# Patient Record
Sex: MALE | Race: BLACK OR AFRICAN AMERICAN | NOT HISPANIC OR LATINO | Employment: OTHER | URBAN - METROPOLITAN AREA
[De-identification: names, ages, dates, MRNs, and addresses within clinical notes are randomized per-mention and may not be internally consistent; named-entity substitution may affect disease eponyms.]

---

## 2017-01-03 DIAGNOSIS — H90.8 MIXED HEARING LOSS: Primary | ICD-10-CM

## 2017-01-05 ENCOUNTER — OFFICE VISIT (OUTPATIENT)
Dept: OPHTHALMOLOGY | Facility: CLINIC | Age: 67
End: 2017-01-05
Attending: OPHTHALMOLOGY
Payer: COMMERCIAL

## 2017-01-05 ENCOUNTER — OFFICE VISIT (OUTPATIENT)
Dept: AUDIOLOGY | Facility: CLINIC | Age: 67
End: 2017-01-05

## 2017-01-05 ENCOUNTER — OFFICE VISIT (OUTPATIENT)
Dept: OTOLARYNGOLOGY | Facility: CLINIC | Age: 67
End: 2017-01-05

## 2017-01-05 DIAGNOSIS — H90.71 MIXED HEARING LOSS OF RIGHT EAR: Primary | ICD-10-CM

## 2017-01-05 DIAGNOSIS — H04.123 DRY EYE SYNDROME OF BILATERAL LACRIMAL GLANDS: ICD-10-CM

## 2017-01-05 DIAGNOSIS — H90.5 SENSORINEURAL HEARING LOSS OF LEFT EAR: ICD-10-CM

## 2017-01-05 DIAGNOSIS — H90.6 MIXED CONDUCTIVE AND SENSORINEURAL HEARING LOSS OF BOTH EARS: ICD-10-CM

## 2017-01-05 DIAGNOSIS — H72.91 PERFORATED EARDRUM, RIGHT: Primary | ICD-10-CM

## 2017-01-05 DIAGNOSIS — T85.22XA DISLOCATED IOL (INTRAOCULAR LENS), POSTERIOR, RIGHT: Primary | ICD-10-CM

## 2017-01-05 PROCEDURE — 99214 OFFICE O/P EST MOD 30 MIN: CPT | Mod: 25

## 2017-01-05 PROCEDURE — 68761 CLOSE TEAR DUCT OPENING: CPT | Mod: RT | Performed by: OPHTHALMOLOGY

## 2017-01-05 PROCEDURE — T1013 SIGN LANG/ORAL INTERPRETER: HCPCS | Mod: U3,ZF

## 2017-01-05 ASSESSMENT — CUP TO DISC RATIO: OD_RATIO: 0.9

## 2017-01-05 ASSESSMENT — CONF VISUAL FIELD
OS_SUPERIOR_TEMPORAL_RESTRICTION: 1
OD_INFERIOR_NASAL_RESTRICTION: 1
OD_SUPERIOR_TEMPORAL_RESTRICTION: 3
OS_SUPERIOR_NASAL_RESTRICTION: 1
OS_INFERIOR_NASAL_RESTRICTION: 1
OD_INFERIOR_TEMPORAL_RESTRICTION: 3
OS_INFERIOR_TEMPORAL_RESTRICTION: 1
OD_SUPERIOR_NASAL_RESTRICTION: 3

## 2017-01-05 ASSESSMENT — TONOMETRY
OS_IOP_MMHG: 49
IOP_METHOD: TONOPEN
OD_IOP_MMHG: 10

## 2017-01-05 ASSESSMENT — VISUAL ACUITY
OS_SC: NLP
OD_PH_SC: 20/200
METHOD: SNELLEN - LINEAR
OD_SC: 20/250

## 2017-01-05 ASSESSMENT — SLIT LAMP EXAM - LIDS
COMMENTS: NORMAL
COMMENTS: NORMAL

## 2017-01-05 ASSESSMENT — PAIN SCALES - GENERAL: PAINLEVEL: NO PAIN (0)

## 2017-01-05 NOTE — PROGRESS NOTES
CC: Referral for subluxed lens RE    Pt cannot hear well. Son reports poor vision, worsening last 1.5 yr, told IOL was dislocated. Surgery in [place].    A/P:    1. Dry eye OD > OS.  2. Pterygium LE.  3. Advanced glaucoma RE.  4. NLP OS.  5. Dislocated intraocular lens RE.  Appears to be 3 piece, may be rigid PMMA.    Recommend optimize surface with preservative free artificial tears every 1-2 hours and Refresh PM at night.    Placed collagen plugs 0.3 mm RLL and 0.2 mm RUL (silicone small did not fit).    Then IOL explantation with ACIOL RE.    Counseled family re: plan      F/u 1 month surface check, can sign up for surgery if surface improved.      ~~~~~~~~~~~~~~~~~~~~~~~~~~~~~~~~~~~~~~~~~~~~~~~~~~~~~~~~~~~~~~~~    I have personally examined the patient and agree with the assessment and plan as delineated by the resident / fellow.  I have confirmed the contents of the chief complaint, history of present illness, review of systems, and medical / surgical history sections and edited the note as needed.    Isai Varma MD, MA  Director, Cornea & Anterior Segment  [facility] Department of Ophthalmology & Visual Neuroscience

## 2017-01-05 NOTE — NURSING NOTE
Chief Complaint   Patient presents with     Consult     hearing loss right ear      Janes Pichardo LPN

## 2017-01-05 NOTE — PATIENT INSTRUCTIONS
Preservative free artificial tears every 1-2 hours    Artificial tear lubricating eye ointment at night (such as Refresh PM)

## 2017-01-05 NOTE — Clinical Note
1/5/2017       RE: Shayne Fiore  [address]   [facility] [address]     Dear Colleague,    Thank you for referring your patient, Shayne Fiore, to the [facility] EAR NOSE AND THROAT at [facility]. Please see a copy of my visit note below.    The patient presents with a history of hearing loss.  The patient reports a progressive hearing loss in both ears over many years, but the left ear is worse than the right ear.  The patient denies ear recent infections, otalgia, otorrhea, dizziness, or tinnitus.  He has had ear infections in the past. The patient denies sinusitis, rhinitis, facial pain, nasal obstruction or purulent nasal discharge. The patient denies chronic or recurrent tonsillitis, chronic or recurrent pharyngitis. The patient's Audiogram and Tympanogram are reviewed with him and they demonstrate bilateral moderate to severe sloping sensorineural hearing loss with an added moderate conductive hearing loss in the right ear. The word recognition scores are 52% on the right and 96% on the left. The patient's tympanogram on the right is flat and it is normal on the left.     This patient is seen in consultation at the request of Dr. Juani Choudhary.    All other systems were reviewed and they are either negative or they are not directly pertinent to this Otolaryngology examination.      Past Medical History:    No past medical history on file.    Past Surgical History:    Past Surgical History   Procedure Laterality Date     Eye surgery Right      x 2, unsure what type of surgery     Scrotal hematoma Right        Medications:      Current outpatient prescriptions:      Hypromellose (ARTIFICIAL TEARS OP), Apply 1 drop to eye as needed, Disp: , Rfl:      oxybutynin (DITROPAN-XL) 5 MG 24 hr tablet, Take 1 tablet (5 mg) by mouth At Bedtime, Disp: 30 tablet, Rfl: 3    Allergies:    Review of patient's allergies indicates no known allergies.    Physical  Examination:    The patient is a well developed, well nourished male in no apparent distress.  He is normocephalic, atraumatic with pupils equally round and reactive to light.    Oral Cavity Examination:  Normal mucosa with no masses or lesions  Nasal Examination: Normal mucosa with no masses or lesions  Ear Examination: Ear canals are impacted with cerumen. The ear canals are cleaned of cerumen using an alligator forceps using a binocular microscope for visualization. The tympanic membrane and middle ear space on the left are normal. The right tympanic membrane has a 60% inferior perforation with no infection or cholesteatoma in the middle ear.   Neurological Examination: Facial nerve function intact and symmetric  Integumentary Examination: No lesions on the skin of the head and neck  Neck Examination: No masses or lesions, no lymphadenopathy  Endocrine Examination: Normal thyroid examination    Assessment and Plan:    The patient presents with a history of hearing loss and a perforation of the right tympanic membrane. The patient will be referred for a CT scan of the temporal bones and a consultation with Dr. Felicia Pereira for possible surgical closure of the right tympanic membrane. The patient will also be referred for a hearing aid consultation.      CC: Dr. Juani Choudhary    Again, thank you for allowing me to participate in the care of your patient.      Sincerely,    Chris Louis MD

## 2017-01-05 NOTE — PROGRESS NOTES
AUDIOLOGY REPORT    SUMMARY: Audiology visit completed. See audiogram for results.      RECOMMENDATIONS: Follow-up with ENT.      Fernanda Waterman, CCC-A  Licensed Audiologist  [place] #[id number]

## 2017-01-05 NOTE — MR AVS SNAPSHOT
After Visit Summary   1/5/2017    Shayne Fiore    MRN: [id number]           Patient Information     Date Of Birth          1950        Visit Information        Provider Department      1/5/2017 8:00 AM Stephanie Cotter; Isia Varma MD [facility]        Today's Diagnoses     Dislocated IOL (intraocular lens), posterior, right    -  1     Dry eye syndrome of bilateral lacrimal glands           Care Instructions    Preservative free artificial tears every 1-2 hours    Artificial tear lubricating eye ointment at night (such as Refresh PM)        Follow-ups after your visit        Your next 10 appointments already scheduled     Jan 27, 2017  1:00 PM   (Arrive by 12:45 PM)   MR PROSTATE with [id number]   [facility] MRI ([facility] [place])    [address] [address] [phone]           Take your medicines as usual, unless your doctor tells you not to. Bring a list of your current medicines to your exam (including vitamins, minerals and over-the-counter drugs). Also bring the results of similar scans you may have had.  Please remove any body piercings and hair extensions before you arrive.  Follow your doctor s orders. If you do not, we may have to postpone your exam.  You will not have contrast for this exam. You do not need to do anything special to prepare.  The MRI machine uses a strong magnet. Please wear clothes without metal (snaps, zippers). A sweatsuit works well, or we may give you a hospital gown.   **IMPORTANT** THE INSTRUCTIONS BELOW ARE ONLY FOR THOSE PATIENTS WHO HAVE BEEN TOLD THEY WILL RECEIVE SEDATION OR GENERAL ANESTHESIA DURING THEIR MRI PROCEDURE:  IF YOU WILL RECEIVE SEDATION (take medicine to help you relax during your exam):   You must get the medicine from your doctor before you arrive. Bring the medicine to the exam. Do not take it at home.   Arrive one hour early. Bring someone who can take you  home after the test. Your medicine will make you sleepy. After the exam, you may not drive, take a bus or take a taxi by yourself.   No eating 8 hours before your exam. You may have clear liquids up until 4 hours before your exam. (Clear liquids include water, clear tea, black coffee and fruit juice without pulp.)  IF YOU WILL RECEIVE ANESTHESIA (be asleep for your exam):   Arrive 1 1/2 hours early. Bring someone who can take you home after the test. You may not drive, take a bus or take a taxi by yourself.   No eating 8 hours before your exam. You may have clear liquids up until 4 hours before your exam. (Clear liquids include water, clear tea, black coffee and fruit juice without pulp.)   You will spend four to five hours in the recovery room.  Please call the Imaging Department at your exam site with any questions.            Jan 27, 2017  2:30 PM   (Arrive by 2:15 PM)   Return Visit with Dolly Granda MD   [facility] Urology and [facility] for Prostate and Urologic Cancers ([email or web address] and [facility] Center)    [address] [address] [phone]            Feb 03, 2017  7:45 AM   RETURN CORNEA with Isai Varma MD   Eye Clinic ([facility])    [address] Taylor [address] Clin [address]  [facility] [id number] [phone]              Who to contact     Please call your clinic at [phone] to:    Ask questions about your health    Make or cancel appointments    Discuss your medicines    Learn about your test results    Speak to your doctor   If you have compliments or concerns about an experience at your clinic, or if you wish to file a complaint, please contact [facility] Patient Relations at [phone] or email us at [email or web address]         Additional Information About Your Visit        JustGohart Information     [employer] is an electronic gateway that provides easy, online access to your  medical records. With [employer], you can request a clinic appointment, read your test results, renew a prescription or communicate with your care team.     To sign up for [employer] visit the website at [email or web address]   You will be asked to enter the access code listed below, as well as some personal information. Please follow the directions to create your username and password.     Your access code is: [email or web address]  Expires: 2017  6:31 AM     Your access code will  in 90 days. If you need help or a new code, please contact your [facility] or call [phone] for assistance.        Care EveryWhere ID     This is your Care EveryWhere ID. This could be used by other organizations to access your [place] medical records  [id number]         Blood Pressure from Last 3 Encounters:   16 148/73    Weight from Last 3 Encounters:   16 58.832 kg (129 lb 11.2 oz)              We Performed the Following     Punctal Closure, Plugs        Primary Care Provider Office Phone # Fax Sathish Choudhary [phone]       [facility]  [facility] [address]        Thank you!     Thank you for choosing EYE CLINIC  for your care. Our goal is always to provide you with excellent care. Hearing back from our patients is one way we can continue to improve our services. Please take a few minutes to complete the written survey that you may receive in the mail after your visit with us. Thank you!             Your Updated Medication List - Protect others around you: Learn how to safely use, store and throw away your medicines at www.disposemymeds.org.          This list is accurate as of: 17  9:33 AM.  Always use your most recent med list.                   Brand Name Dispense Instructions for use    ARTIFICIAL TEARS OP      Apply 1 drop to eye as needed       oxybutynin 5 MG 24 hr tablet    DITROPAN-XL    30 tablet    Take 1  tablet (5 mg) by mouth At Bedtime

## 2017-01-05 NOTE — MR AVS SNAPSHOT
After Visit Summary   1/5/2017    Shayne Fiore    MRN: [id number]           Patient Information     Date Of Birth          1950        Visit Information        Provider Department      1/5/2017 3:00 PM Nelson Flores; Chris Louis MD [facility] Ear Nose and Throat        Today's Diagnoses     Perforated eardrum, right    -  1     Mixed conductive and sensorineural hearing loss of both ears           Care Instructions    The patient presents with a history of hearing loss and a perforation of the right tympanic membrane. The patient will be referred for a CT scan of the temporal bones and a consultation with Dr. Felicia Pereira for possible surgical closure of the right tympanic membrane. The patient will also be referred for a hearing aid consultation.         Follow-ups after your visit        Your next 10 appointments already scheduled     Jan 05, 2017  6:00 PM   CT TEMPORAL ORBITAL SELLA W/O CONTRAST with UCCT1   [facility] [place] [facility] ([facility])    [address] [place] [address] [phone]           Please bring any scans or X-rays taken at other hospitals, if similar tests were done. Also bring a list of your medicines, including vitamins, minerals and over-the-counter drugs. It is safest to leave personal items at home.  Be sure to tell your doctor:   If you have any allergies.   If there s any chance you are pregnant.   If you are breastfeeding.   If you have any special needs.  You do not need to do anything special to prepare.  Please wear loose clothing, such as a sweat suit or jogging clothes. Avoid snaps, zippers and other metal. We may ask you to undress and put on a hospital gown.            Jan 23, 2017 10:00 AM   (Arrive by 9:45 AM)   NEW NEUROTOLOGY VISIT with Felicia Pereira MD   [facility] Ear Nose and Throat ([facility])    [address] [place] [address]  4th [facility] [address]    [phone]            Jan 27, 2017  1:00 PM   (Arrive by 12:45 PM)   MR PROSTATE with [id number]   [facility] MRI ([facility] [place])    [address] [facility]  1st [facility] [address] [phone]           Take your medicines as usual, unless your doctor tells you not to. Bring a list of your current medicines to your exam (including vitamins, minerals and over-the-counter drugs). Also bring the results of similar scans you may have had.  Please remove any body piercings and hair extensions before you arrive.  Follow your doctor s orders. If you do not, we may have to postpone your exam.  You will not have contrast for this exam. You do not need to do anything special to prepare.  The MRI machine uses a strong magnet. Please wear clothes without metal (snaps, zippers). A sweatsuit works well, or we may give you a hospital gown.   **IMPORTANT** THE INSTRUCTIONS BELOW ARE ONLY FOR THOSE PATIENTS WHO HAVE BEEN TOLD THEY WILL RECEIVE SEDATION OR GENERAL ANESTHESIA DURING THEIR MRI PROCEDURE:  IF YOU WILL RECEIVE SEDATION (take medicine to help you relax during your exam):   You must get the medicine from your doctor before you arrive. Bring the medicine to the exam. Do not take it at home.   Arrive one hour early. Bring someone who can take you home after the test. Your medicine will make you sleepy. After the exam, you may not drive, take a bus or take a taxi by yourself.   No eating 8 hours before your exam. You may have clear liquids up until 4 hours before your exam. (Clear liquids include water, clear tea, black coffee and fruit juice without pulp.)  IF YOU WILL RECEIVE ANESTHESIA (be asleep for your exam):   Arrive 1 1/2 hours early. Bring someone who can take you home after the test. You may not drive, take a bus or take a taxi by yourself.   No eating 8 hours before your exam. You may have clear liquids up until 4 hours before your exam. (Clear liquids include  water, clear tea, black coffee and fruit juice without pulp.)   You will spend four to five hours in the recovery room.  Please call the Imaging Department at your exam site with any questions.            Jan 27, 2017  2:30 PM   (Arrive by 2:15 PM)   Return Visit with Dolly Granda MD   [facility]rology and Inst for Prostate and Urologic Cancers ([facility] [place])    [address] [facility]  4th [facility] [id number] [phone]            Feb 03, 2017  7:45 AM   RETURN CORNEA with Isai Varma MD   Eye Clinic ([facility])    [address]  [address] [facility]  9th Fl Clin 9a  [facility] [id number] [phone]            Feb 08, 2017  2:00 PM   Hearing Aid Consult with GERMAN Moran   [facility] Audiology ([facility])    [address] [id number] [phone]              Future tests that were ordered for you today     Open Future Orders        Priority Expected Expires Ordered    CT Temporal orbital sella w/o contrast Routine  1/5/2018 1/5/2017            Who to contact     Please call your clinic at [phone] to:    Ask questions about your health    Make or cancel appointments    Discuss your medicines    Learn about your test results    Speak to your doctor   If you have compliments or concerns about an experience at your clinic, or if you wish to file a complaint, please contact [facility] Patient Relations at [phone] or email us at [email or web address]         Additional Information About Your Visit        EventRegist Information     EventRegist is an electronic gateway that provides easy, online access to your medical records. With Event[employer]ist, you can request a clinic appointment, read your test results, renew a prescription or communicate with your care team.     To sign up for Event[employer]ist visit the website at [email or web address]   You  will be asked to enter the access code listed below, as well as some personal information. Please follow the directions to create your username and password.     Your access code is: [email or web address]  Expires: 2017  6:31 AM     Your access code will  in 90 days. If you need help or a new code, please contact your [facility] or call [phone] for assistance.        Care EveryWhere ID     This is your Care EveryWhere ID. This could be used by other organizations to access your [place] medical records  [id number]         Blood Pressure from Last 3 Encounters:   16 148/73    Weight from Last 3 Encounters:   16 58.832 kg (129 lb 11.2 oz)              We Performed the Following     AUDIO REVIEW/CONSULT        Primary Care Provider Office Phone # Fax #    Juani SRIKANTH Choudhary [phone]       Critical access hospital  [facility] [address]        Thank you!     Thank you for choosing [facility] EAR NOSE AND THROAT  for your care. Our goal is always to provide you with excellent care. Hearing back from our patients is one way we can continue to improve our services. Please take a few minutes to complete the written survey that you may receive in the mail after your visit with us. Thank you!             Your Updated Medication List - Protect others around you: Learn how to safely use, store and throw away your medicines at www.disposemymeds.org.          This list is accurate as of: 17  3:59 PM.  Always use your most recent med list.                   Brand Name Dispense Instructions for use    ARTIFICIAL TEARS OP      Apply 1 drop to eye as needed       oxybutynin 5 MG 24 hr tablet    DITROPAN-XL    30 tablet    Take 1 tablet (5 mg) by mouth At Bedtime

## 2017-01-05 NOTE — NURSING NOTE
Chief Complaints and History of Present Illnesses   Patient presents with     New Patient     Subluxed IOL Right Eye     HPI    Affected eye(s):  Both   Symptoms:     No floaters   No flashes         Do you have eye pain now?:  No      Comments:  Pt states no changes in vision since last visit. Redness and irritation comes and goes LEBrady DUNN January 5, 2017 8:35 AM

## 2017-01-05 NOTE — PROGRESS NOTES
The patient presents with a history of hearing loss.  The patient reports a progressive hearing loss in both ears over many years, but the left ear is worse than the right ear.  The patient denies ear recent infections, otalgia, otorrhea, dizziness, or tinnitus.  He has had ear infections in the past. The patient denies sinusitis, rhinitis, facial pain, nasal obstruction or purulent nasal discharge. The patient denies chronic or recurrent tonsillitis, chronic or recurrent pharyngitis. The patient's Audiogram and Tympanogram are reviewed with him and they demonstrate bilateral moderate to severe sloping sensorineural hearing loss with an added moderate conductive hearing loss in the right ear. The word recognition scores are 52% on the right and 96% on the left. The patient's tympanogram on the right is flat and it is normal on the left.     This patient is seen in consultation at the request of Dr. Juani Choudhary.    All other systems were reviewed and they are either negative or they are not directly pertinent to this Otolaryngology examination.      Past Medical History:    No past medical history on file.    Past Surgical History:    Past Surgical History   Procedure Laterality Date     Eye surgery Right      x 2, unsure what type of surgery     Scrotal hematoma Right        Medications:      Current outpatient prescriptions:      Hypromellose (ARTIFICIAL TEARS OP), Apply 1 drop to eye as needed, Disp: , Rfl:      oxybutynin (DITROPAN-XL) 5 MG 24 hr tablet, Take 1 tablet (5 mg) by mouth At Bedtime, Disp: 30 tablet, Rfl: 3    Allergies:    Review of patient's allergies indicates no known allergies.    Physical Examination:    The patient is a well developed, well nourished male in no apparent distress.  He is normocephalic, atraumatic with pupils equally round and reactive to light.    Oral Cavity Examination:  Normal mucosa with no masses or lesions  Nasal Examination: Normal mucosa with no masses or  lesions  Ear Examination: Ear canals are impacted with cerumen. The ear canals are cleaned of cerumen using an alligator forceps using a binocular microscope for visualization. The tympanic membrane and middle ear space on the left are normal. The right tympanic membrane has a 60% inferior perforation with no infection or cholesteatoma in the middle ear.   Neurological Examination: Facial nerve function intact and symmetric  Integumentary Examination: No lesions on the skin of the head and neck  Neck Examination: No masses or lesions, no lymphadenopathy  Endocrine Examination: Normal thyroid examination    Assessment and Plan:    The patient presents with a history of hearing loss and a perforation of the right tympanic membrane. The patient will be referred for a CT scan of the temporal bones and a consultation with Dr. Felicia Pereira for possible surgical closure of the right tympanic membrane. The patient will also be referred for a hearing aid consultation.      CC: Dr. Juani Choudhary

## 2017-01-05 NOTE — PATIENT INSTRUCTIONS
The patient presents with a history of hearing loss and a perforation of the right tympanic membrane. The patient will be referred for a CT scan of the temporal bones and a consultation with Dr. Felicia Pereira for possible surgical closure of the right tympanic membrane. The patient will also be referred for a hearing aid consultation.

## 2017-01-23 ENCOUNTER — OFFICE VISIT (OUTPATIENT)
Dept: OTOLARYNGOLOGY | Facility: CLINIC | Age: 67
End: 2017-01-23

## 2017-01-23 DIAGNOSIS — H72.91 PERFORATION OF TYMPANIC MEMBRANE, RIGHT: Primary | ICD-10-CM

## 2017-01-23 DIAGNOSIS — H92.11 OTORRHEA, RIGHT: ICD-10-CM

## 2017-01-23 ASSESSMENT — PAIN SCALES - GENERAL: PAINLEVEL: NO PAIN (0)

## 2017-01-23 NOTE — PATIENT INSTRUCTIONS
Patient to review pre operative information.   Patient to schedule a pre-op physical with their primary MD or with our Preoperative Assessment Clinic within 30 days of the procedure.    Patient to avoid blood thinning medications 1 week prior to surgery (Ibuprofen, Aleve, Aspirin, fish oil )   Use the antiseptic scrub as directed.   You will need to schedule a post operative appointment for 3 weeks after surgery    Patient to call the ENT clinic with further questions or concerns:   ENT Triage Nurse  [phone] option 3  ENT appointment scheduling [phone] option 1  ENT surgery scheduling, [place] [phone]  ENT Nurse Mariaelena [phone]

## 2017-01-23 NOTE — PROGRESS NOTES
"Neurotology Consult Note  1/23/2017    Shayne Fiore is seen in consultation from Dr. Chris Louis.  He is a 67 year old male being seen for chronic right TM perforation and right mixed hearing loss. He reports a history of several ear infections in the past and \"bursting\" of the ear drum due to infection. He reports that hearing has been poor in that ear for a while. [place] He denies any recent ear drainage or ear pain. He denies ringing in the ear. No vertigo.       Past Medical History:  Eye problems    Past Surgical History   Procedure Laterality Date     Eye surgery Right      x 2, unsure what type of surgery     Scrotal hematoma Right        Family History   Problem Relation Age of Onset     Glaucoma Sister      Macular Degeneration No family hx of      Glaucoma Cousin        Social History   Substance Use Topics     Smoking status: Never Smoker      Smokeless tobacco: Never Used     Alcohol Use: Not on file       Patient Supplied Answers to Review of Systems   ENT ROS 1/23/2017   Constitutional Problems with sleep   Ears, Nose, Throat Nasal congestion or drainage   The remainder of the 10 point review of systems is otherwise negative.    Physical examination:  Constitutional:  In no acute distress, appears older than stated age  Eyes:  Extraocular movements intact, no spontaneous nystagmus,   Ears:  Both ears examined under the microscope.  Both ear canals clear.  Right TM with 50% inferior perforation with thickened edges and the entire TM is somewhat thick, middle ear mucosa appears clear. White likely fungal elements along inferior sulcus were swabbed and sent for culture. No signs of acute infection.  Left TM intact with an aerated middle ear.  Respiratory:  No increased work of breathing, wheezing or stridor  Musculoskeletal:  Good upper extremity strength  Skin:  No rashes on the head and neck  Neurologic:  House " Brackman 1/6 bilaterally, ambulating normally  Psychiatric:  Alert, normal affect, answering questions appropriately    Audiogram:  Assymetric sensorineural hearing loss R>L. Left downsloping mild to severe sensorineural hearing loss; Right upsloping profound to severe mixed hearing loss with conductive component in low and middle frequencies and a mild downsloping to severe upsloping to mild sensorineural component. Word recognition is 52% at 105dB on the right, 96% on the left.  Flat tympanogram on the right, Type A on left    Assessment and plan:  67 y.o male with a right mixed hearing loss with profound to severe mixed loss and a large conductive component in the low and mid frequencies. We discussed repair with a cartilage backed tympanoplasty which likely could be performed transcanal although there may be a need to go postauricular.  The risks and benefits were discussed.  The risks include but are not limited to:  Worsened hearing which may require further surgery, profound and irreversible hearing loss, dizziness, damage to the taste nerve, damage to the facial nerve, tympanic membrane perforation requiring further surgery and infection.  Postoperative restrictions were discussed.  However, if he has a fungal infection, that would need to be cleared prior to surgery so I would like to see him back in 3 weeks.  He expressed understanding and would like to move forward with cartilage tympanoplasty.     Patient examined with ENT staff, Dr. Felicia Pereira.    Anel George MD  Otolaryngology- Head and Neck Surgery PGY2    The patient was seen and examined by myself.  I agree with the resident's assessment and plan.    Felicia Pereira MD

## 2017-01-23 NOTE — MR AVS SNAPSHOT
After Visit Summary   1/23/2017    Shayne Fiore    MRN: [id number]           Patient Information     Date Of Birth          1950        Visit Information        Provider Department      1/23/2017 9:45 AM Tripp Ray; Felicia Pereira MD [facility]        Care Instructions     Patient to review pre operative information.   Patient to schedule a pre-op physical with their primary MD or with our Preoperative Assessment Clinic within 30 days of the procedure.    Patient to avoid blood thinning medications 1 week prior to surgery (Ibuprofen, Aleve, Aspirin, fish oil )   Use the antiseptic scrub as directed.   You will need to schedule a post operative appointment for 3 weeks after surgery    Patient to call the ENT clinic with further questions or concerns:   ENT Triage Nurse  [phone] option 3  ENT appointment scheduling [phone] option 1  ENT surgery scheduling, [place] [phone]  ENT Nurse Mariaelena [phone]        Follow-ups after your visit        Your next 10 appointments already scheduled     Jan 27, 2017  1:00 PM   (Arrive by 12:45 PM)   MR PROSTATE with [id number]   [facility] ([facility])    [address] [address] [phone]           Take your medicines as usual, unless your doctor tells you not to. Bring a list of your current medicines to your exam (including vitamins, minerals and over-the-counter drugs). Also bring the results of similar scans you may have had.  Please remove any body piercings and hair extensions before you arrive.  Follow your doctor s orders. If you do not, we may have to postpone your exam.  You will not have contrast for this exam. You do not need to do anything special to prepare.  The MRI machine uses a strong magnet. Please wear clothes without metal (snaps, zippers). A sweatsuit works well, or we may give you a hospital gown.   **IMPORTANT** THE  INSTRUCTIONS BELOW ARE ONLY FOR THOSE PATIENTS WHO HAVE BEEN TOLD THEY WILL RECEIVE SEDATION OR GENERAL ANESTHESIA DURING THEIR MRI PROCEDURE:  IF YOU WILL RECEIVE SEDATION (take medicine to help you relax during your exam):   You must get the medicine from your doctor before you arrive. Bring the medicine to the exam. Do not take it at home.   Arrive one hour early. Bring someone who can take you home after the test. Your medicine will make you sleepy. After the exam, you may not drive, take a bus or take a taxi by yourself.   No eating 8 hours before your exam. You may have clear liquids up until 4 hours before your exam. (Clear liquids include water, clear tea, black coffee and fruit juice without pulp.)  IF YOU WILL RECEIVE ANESTHESIA (be asleep for your exam):   Arrive 1 1/2 hours early. Bring someone who can take you home after the test. You may not drive, take a bus or take a taxi by yourself.   No eating 8 hours before your exam. You may have clear liquids up until 4 hours before your exam. (Clear liquids include water, clear tea, black coffee and fruit juice without pulp.)   You will spend four to five hours in the recovery room.  Please call the Imaging Department at your exam site with any questions.            Jan 27, 2017  2:30 PM   (Arrive by 2:15 PM)   Return Visit with Dolly Granda MD   [facility] ([facility])    [address] [place] [address]  4th [facility] [id number] [phone]            Feb 03, 2017  7:45 AM   RETURN CORNEA with Isai Varma MD   Eye Clinic ([facility])    [address]  [address] [place] [address]  9[facility]  [facility] [address] [phone]            Feb 08, 2017  2:00 PM   Hearing Aid Consult with GERMAN Moran   [facility] ([facility])    [address] [id number] [phone]               Who to contact     Please call your clinic at [phone] to:    Ask questions about your health    Make or cancel appointments    Discuss your medicines    Learn about your test results    Speak to your doctor   If you have compliments or concerns about an experience at your clinic, or if you wish to file a complaint, please contact [facility] Patient Relations at [phone] or email us at [email or web address]         Additional Information About Your Visit        [employer] Information     [employer] is an electronic gateway that provides easy, online access to your medical records. With [employer], you can request a clinic appointment, read your test results, renew a prescription or communicate with your care team.     To sign up for [employer] visit the website at [email or web address]   You will be asked to enter the access code listed below, as well as some personal information. Please follow the directions to create your username and password.     Your access code is: [email or web address]  Expires: 2017  6:31 AM     Your access code will  in 90 days. If you need help or a new code, please contact your [facility] or call [phone] for assistance.        Care EveryWhere ID     This is your Care EveryWhere ID. This could be used by other organizations to access your [place] medical records  [id number]         Blood Pressure from Last 3 Encounters:   16 148/73    Weight from Last 3 Encounters:   16 58.832 kg (129 lb 11.2 oz)              Today, you had the following     No orders found for display       Primary Care Provider Office Phone # Fax #    Juani SRIKANTH Choudhary [phone]       [facility]  [facility] [address]        Thank you!     Thank you for choosing [facility] EAR NOSE [facility] TH[facility]AT  for your care. Our goal is always to provide you with excellent care. Hearing back  from our patients is one way we can continue to improve our services. Please take a few minutes to complete the written survey that you may receive in the mail after your visit with us. Thank you!             Your Updated Medication List - Protect others around you: Learn how to safely use, store and throw away your medicines at www.disposemymeds.org.          This list is accurate as of: 1/23/17 11:09 AM.  Always use your most recent med list.                   Brand Name Dispense Instructions for use    ARTIFICIAL TEARS OP      Apply 1 drop to eye as needed       oxybutynin 5 MG 24 hr tablet    DITROPAN-XL    30 tablet    Take 1 tablet (5 mg) by mouth At Bedtime

## 2017-01-23 NOTE — NURSING NOTE
Teaching Flowsheet - ENT   Relevant Diagnosis: perforation right ear  Teaching Topic:right cartiledge back tympanoplasty   Person(s) involved in teaching:  Patient- 2 sons. Via . Sons speak enlish.     Motivation Level:  Asks Questions:   Yes  Eager to Learn:   Yes  Cooperative:   Yes  Receptive (willing/able to accept information):   Yes  Comments: Reviewed pre-op H and P,  NPO prior to  surgery,  pre-op scrub (given Hibiclens)  Reviewed post-op  cares including  ear/wound care, activity and pain.     Patient demonstrates understanding of the following:  Reason for the appointment, diagnosis and treatment plan:   Yes  Knowledge of proper use of medications and conditions for which they are ordered (with special attention to potential side effects or drug interactions):  stop aspirin products 1 week before surgery Yes  Which situations necessitate calling provider and whom to contact:   Yes  Nutritional needs and diet plan:   Yes  Pain management techniques:   Yes  Patient instructed on hand hygiene:  Yes  How and/when to access community resources:   Yes     Infection Prevention:  Patient   demonstrates understanding of the following:  Surgical procedure site care taught Yes  Signs and symptoms of infection taught Yes  Wound care taught Yes  Instructional Materials Used/Given: pre- op booklet, ear surgery  handout and verbal  Instruction.  Nelson [phone] not at clinic visit today  [facility] [phone] here today  Pt will be called with the results of the ear culture

## 2017-01-23 NOTE — NURSING NOTE
Chief Complaint   Patient presents with     Consult     referred by Doctor Louis. right Tympanic perforation     Janes Pichardo LPN

## 2017-01-23 NOTE — Clinical Note
"1/23/2017       RE: Shayne Fiore  [address]   [facility] [address]     Dear Colleague,    Thank you for referring your patient, Shayne Fiore, to the [facility] at [facility]. Please see a copy of my visit note below.    Neurotology Consult Note  1/23/2017    Shayne Fiore is seen in consultation from Dr. Chris Louis.  He is a 67 year old male being seen for chronic right TM perforation and right mixed hearing loss. He reports a history of several ear infections in the past and \"bursting\" of the ear drum due to infection. He reports that hearing has been poor in that ear for a while. He was [place] He denies any recent ear drainage or ear pain. He denies ringing in the ear. No vertigo.       Past Medical History:  Eye problems    Past Surgical History   Procedure Laterality Date     Eye surgery Right      x 2, unsure what type of surgery     Scrotal hematoma Right        Family History   Problem Relation Age of Onset     Glaucoma Sister      Macular Degeneration No family hx of      Glaucoma Cousin        Social History   Substance Use Topics     Smoking status: Never Smoker      Smokeless tobacco: Never Used     Alcohol Use: Not on file       Patient Supplied Answers to Review of Systems  [facility] ENT ROS 1/23/2017   Constitutional Problems with sleep   Ears, Nose, Throat Nasal congestion or drainage   The remainder of the 10 point review of systems is otherwise negative.    Physical examination:  Constitutional:  In no acute distress, appears older than stated age  Eyes:  Extraocular movements intact, no spontaneous nystagmus,   Ears:  Both ears examined under the microscope.  Both ear canals clear.  Right TM with 50% inferior perforation with thickened edges and the entire TM is somewhat thick, middle ear mucosa appears clear. White likely fungal elements along " inferior sulcus were swabbed and sent for culture. No signs of acute infection.  Left TM intact with an aerated middle ear.  Respiratory:  No increased work of breathing, wheezing or stridor  Musculoskeletal:  Good upper extremity strength  Skin:  No rashes on the head and neck  Neurologic:  House Brackman 1/6 bilaterally, ambulating normally  Psychiatric:  Alert, normal affect, answering questions appropriately    Audiogram:  Assymetric sensorineural hearing loss R>L. Left downsloping mild to severe sensorineural hearing loss; Right upsloping profound to severe mixed hearing loss with conductive component in low and middle frequencies and a mild downsloping to severe upsloping to mild sensorineural component. Word recognition is 52% at 105dB on the right, 96% on the left.  Flat tympanogram on the right, Type A on left    Assessment and plan:  67 y.o male with a right mixed hearing loss with profound to severe mixed loss and a large conductive component in the low and mid frequencies. We discussed repair with a cartilage backed tympanoplasty which likely could be performed transcanal although there may be a need to go postauricular.  The risks and benefits were discussed.  The risks include but are not limited to:  Worsened hearing which may require further surgery, profound and irreversible hearing loss, dizziness, damage to the taste nerve, damage to the facial nerve, tympanic membrane perforation requiring further surgery and infection.  Postoperative restrictions were discussed.  However, if he has a fungal infection, that would need to be cleared prior to surgery so I would like to see him back in 3 weeks.  He expressed understanding and would like to move forward with cartilage tympanoplasty.     Patient examined with ENT staff, Dr. Felicia Pereira.    Anel George MD  Otolaryngology- Head and Neck Surgery PGY2    The patient was seen and examined by myself.  I agree with the resident's assessment and  plan.    Felicia Pereira MD

## 2017-01-24 ENCOUNTER — PRE VISIT (OUTPATIENT)
Dept: UROLOGY | Facility: CLINIC | Age: 67
End: 2017-01-24

## 2017-01-24 NOTE — TELEPHONE ENCOUNTER
Patient is coming in to see  for elevated PSA, patient last seen Viky Barrera PA-C and PSA was 19. Patient is get a MRI of the prostate before appointment

## 2017-01-25 ENCOUNTER — CARE COORDINATION (OUTPATIENT)
Dept: OTOLARYNGOLOGY | Facility: CLINIC | Age: 67
End: 2017-01-25

## 2017-01-25 DIAGNOSIS — H92.21 OTORRHAGIA, RIGHT: Primary | ICD-10-CM

## 2017-01-25 LAB
BACTERIA SPEC CULT: ABNORMAL
MICRO REPORT STATUS: ABNORMAL
SPECIMEN SOURCE: ABNORMAL

## 2017-01-25 RX ORDER — CLOTRIMAZOLE 1 G/ML
SOLUTION TOPICAL
Qty: 30 ML | Refills: 1 | Status: SHIPPED | OUTPATIENT
Start: 2017-01-25 | End: 2017-02-08

## 2017-01-25 NOTE — PROGRESS NOTES
Hi.  As expected, yeast.  Would you put in clotrimazole for him?  5 drops bid x 21 days or up to his surgery date, whichever is longer (he hasn't been scheduled yet)?  Thanks.     Felicia     1-25-17 above message given to carson Blackwood, [phone], he understood and would inform pt.--------- Mariaelena Lisa RN  ENT Care Coordinator   Otology  [phone]

## 2017-01-27 ENCOUNTER — OFFICE VISIT (OUTPATIENT)
Dept: UROLOGY | Facility: CLINIC | Age: 67
End: 2017-01-27

## 2017-01-27 VITALS
WEIGHT: 134.6 LBS | DIASTOLIC BLOOD PRESSURE: 70 MMHG | HEART RATE: 76 BPM | HEIGHT: 72 IN | BODY MASS INDEX: 18.23 KG/M2 | SYSTOLIC BLOOD PRESSURE: 133 MMHG

## 2017-01-27 DIAGNOSIS — R97.20 ELEVATED PROSTATE SPECIFIC ANTIGEN (PSA): Primary | ICD-10-CM

## 2017-01-27 RX ORDER — CIPROFLOXACIN 500 MG/1
500 TABLET, FILM COATED ORAL 2 TIMES DAILY
Qty: 6 TABLET | Refills: 0 | Status: SHIPPED | OUTPATIENT
Start: 2017-01-27 | End: 2017-04-07

## 2017-01-27 RX ORDER — TAMSULOSIN HYDROCHLORIDE 0.4 MG/1
0.4 CAPSULE ORAL DAILY
Qty: 90 CAPSULE | Refills: 3 | Status: SHIPPED | OUTPATIENT
Start: 2017-01-27 | End: 2017-04-07

## 2017-01-27 ASSESSMENT — PAIN SCALES - GENERAL: PAINLEVEL: NO PAIN (0)

## 2017-01-27 NOTE — PATIENT INSTRUCTIONS
[place] [facility]  MRI Fusion Prostate Bx Patient Instructions  What is MRI Fusion Prostate Bx?  The MRI fusion biopsy is used to target a specific area for sampling. It requires a needle guide to be inserted into the rectum next to the prostate. Next, MR images are viewed, the abnormal area is identified, and a needle is inserted through the guide to the targeted area for tissue samples.  How do I prepare for the exam?    Take Cipro 500mg one tablet in the morning and one in the evening starting the day prior to procedure x 3 days    You will be receiving a Gentamicin intramuscular injection in the Urology clinic 30 min prior to your procedure. Please plan to arrive 30 min earlier.      Do Fleets Enema 2 hrs prior to procedure    Stop NSAIDS/Aspirin 7 days prior to procedure. If you are taking any other anticoagulation medications such as Coumadin, Heparin, or Lovenox, please consult with your physician prior to scheduling the procedure    How long will procedure take?  MRI fusion biopsy will take about 1 hr. Please allow 2 hrs for the whole procedure (including pre and post)  When will I know my results?  We will schedule a 2 week follow up appointment for you to review your pathology results with your physician.   *Please arrive 30 min prior to your scheduled procedure time*  Who do I contact if I have questions?  Please call Urology and [facility] at [phone] Opt # 3 to speak to a nurse.

## 2017-01-27 NOTE — PROGRESS NOTES
"Office Visit Note      UROLOGIC DIAGNOSES:   Elevated PSA, lower urinary tract symptoms      CURRENT INTERVENTIONS:       HISTORY:     Mr. 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  He presents from [facility], accompanied by a professional  and two sons.  They are speaking Guyanese.    -Three years ago saw a urologist and was given a medicine - tamsulosin. He thinks this might have worked, but is not currently taking this rx.   Patient was given rx for oxybutinin but patient has stopped taking it as it was not effective and he did not like the side effects.   -Today c/o frequent urination (\"often\"), nocturia x 5, and urge incontinence    - No hesitancy or intermittency or straining. Feels he probably empties  - No dysuria  - Does have h/o sudden fevers with high temperatures and worsening of urine control.  Upon further questioning he admits this may have been a urinary tract infection.     - Denies stones.  Denies hematuria (although is blind so he might not notice)    Traveled here from [facility].  Prior to arrival had PSA= 14 - doesn't have this paperwork.       Patient had MRI prostate today, pending final reading.     PAST MEDICAL HISTORY: No past medical history on file.    PAST SURGICAL HISTORY:   Past Surgical History   Procedure Laterality Date     Eye surgery Right      x 2, unsure what type of surgery     Scrotal hematoma Right        FAMILY HISTORY:   Family History   Problem Relation Age of Onset     Glaucoma Sister      Macular Degeneration No family hx of      Glaucoma Cousin        SOCIAL HISTORY:   Social History   Substance Use Topics     Smoking status: Never Smoker      Smokeless tobacco: Never Used     Alcohol Use: Not on file       Current Outpatient Prescriptions   Medication     clotrimazole (LOTRIMIN) 1 % solution     Hypromellose (ARTIFICIAL TEARS OP)     oxybutynin (DITROPAN-XL) 5 MG 24 hr tablet     No current facility-administered " medications for this visit.         PHYSICAL EXAM:    /70 mmHg  Pulse 76  Ht 1.829 m (6')  Wt 61.054 kg (134 lb 9.6 oz)  BMI 18.25 kg/m2    HEENT: Normocephalic and atraumatic   Cardiac: Not done  Back/Flank: Not done  CNS/PNS: Not done  Respiratory: Normal non-labored breathing  Abdomen: Soft nontender and nondistended  Peripheral Vascular: Not done  Mental Status: Not done    Penis: Not done  Scrotal Skin: Not done  Testicles: Not done  Epididymis: Not done  Digital Rectal Exam:     Cystoscopy: Not done    Imaging: None    Urinalysis: UA RESULTS:  Recent Labs   Lab Test  12/13/16   1700   COLOR  Yellow   APPEARANCE  Slightly Cloudy   URINEGLC  Negative   URINEBILI  Negative   URINEKETONE  Negative   SG  1.018   UBLD  Small*   URINEPH  5.0   PROTEIN  Negative   NITRITE  Negative   LEUKEST  Trace*   RBCU  <1   WBCU  2       PSA: 18.87    Post Void Residual:     Other labs: None today      IMPRESSION:  68 y/o M with elevated PSA     PLAN:  Re-start tamsulosin as patient states is was working in the past   Continue d/c oxybutinin (patient self d/c)   Schedule for MRI guided prostate biopsy next available   Will consider options for lower urinary tract symptoms pending prostate biopsy     Total Time: 15 minutes                                       Total in Consultation: greater than 50%

## 2017-01-27 NOTE — MR AVS SNAPSHOT
After Visit Summary   1/27/2017    Shayne Fiore    MRN: [id number]           Patient Information     Date Of Birth          1950        Visit Information        Provider Department      1/27/2017 2:15 PM Philip Lockhart Maria Alexandra, MD [facility] Urology and [facility] for Prostate and Urologic Cancers        Today's Diagnoses     Elevated prostate specific antigen (PSA)    -  1       Care Instructions      [place] for Prostate and Urologic Cancers  MRI Fusion Prostate Bx Patient Instructions  What is MRI Fusion Prostate Bx?  The MRI fusion biopsy is used to target a specific area for sampling. It requires a needle guide to be inserted into the rectum next to the prostate. Next, MR images are viewed, the abnormal area is identified, and a needle is inserted through the guide to the targeted area for tissue samples.  How do I prepare for the exam?    Take Cipro 500mg one tablet in the morning and one in the evening starting the day prior to procedure x 3 days    You will be receiving a Gentamicin intramuscular injection in the Urology clinic 30 min prior to your procedure. Please plan to arrive 30 min earlier.      Do Fleets Enema 2 hrs prior to procedure    Stop NSAIDS/Aspirin 7 days prior to procedure. If you are taking any other anticoagulation medications such as Coumadin, Heparin, or Lovenox, please consult with your physician prior to scheduling the procedure    How long will procedure take?  MRI fusion biopsy will take about 1 hr. Please allow 2 hrs for the whole procedure (including pre and post)  When will I know my results?  We will schedule a 2 week follow up appointment for you to review your pathology results with your physician.   *Please arrive 30 min prior to your scheduled procedure time*  Who do I contact if I have questions?  Please call Urology and [facility] at [phone] Opt # 3 to speak to a nurse.         Follow-ups after your visit        Your next 10 appointments  already scheduled     Feb 03, 2017  7:45 AM   RETURN CORNEA with Isai Varma MD   Eye Clinic ([facility])    [address]  [address] [facility]  9th Fl Clin 9a  [facility] [address]   [phone]            Feb 08, 2017  2:00 PM   Hearing Aid Consult with GERMAN Moran   [facility] Audiology ([facility])    [address] [place] [address]  4th [facility] [address] [phone]            Feb 24, 2017  8:30 AM   (Arrive by 8:15 AM)   Sonography/Biopsy with Dolly Granda MD   [facility] Urology and [facility] for Prostate and Urologic Cancers ([facility])    [address] [place] [address]  4th [facility] [address] [phone]            Apr 10, 2017 12:00 PM   (Arrive by 11:45 AM)   PAC PHONE RN ASSESSMENT with  Pac Rn   [facility] Preoperative Assessment Center ([facility])    [address] [place] [address]  4th [facility] [address] [phone]           Note: this is not an onsite visit; there is no need to come to the facility.            Apr 12, 2017   Procedure with Felicia Pereira MD   [facility] Surgery and Procedure Center ([facility])    [address] [place] [address]  5th [facility] [address] [phone]           Located in the Clinics and Surgery Center at [address] [place] [address].   parking is very convenient and highly recommended.  is a $6 flat rate fee; no tips accepted.  Both  and self parkers should enter the main arrival plaza from [facility]; parking attendants will direct you based on your parking preference.            Apr 12, 2017 11:40 AM   Test/Procedure with  Generic    Services Department ([facility])    [address] [place] [address]               May 03, 2017 10:30 AM   (Arrive by 10:15 AM)   Return Visit with Felicia Pereira MD    [facility] Ear Nose and Throat ([facility] [place])    [address] [address] [phone]              Who to contact     Please call your clinic at [phone] to:    Ask questions about your health    Make or cancel appointments    Discuss your medicines    Learn about your test results    Speak to your doctor   If you have compliments or concerns about an experience at your clinic, or if you wish to file a complaint, please contact [facility] Patient Relations at [phone] or email us at [email or web address]         Additional Information About Your Visit        YogaTrailharClicko Information     MiiPharost is an electronic gateway that provides easy, online access to your medical records. With El Teatro, you can request a clinic appointment, read your test results, renew a prescription or communicate with your care team.     To sign up for El Teatro visit the website at [email or web address]   You will be asked to enter the access code listed below, as well as some personal information. Please follow the directions to create your username and password.     Your access code is: [email or web address]  Expires: 2017  6:31 AM     Your access code will  in 90 days. If you need help or a new code, please contact your [facility] Clinic or call [phone] for assistance.        Care EveryWhere ID     This is your Care EveryWhere ID. This could be used by other organizations to access your [place] medical records  [id number]        Your Vitals Were     Pulse Height BMI (Body Mass Index)             76 1.829 m (6') 18.25 kg/m2          Blood Pressure from Last 3 Encounters:   17 133/70   16 148/73    Weight from Last 3 Encounters:   17 61.054 kg (134 lb 9.6 oz)   16 58.832 kg (129 lb 11.2 oz)              We Performed the Following     POST-VOID RESIDUAL BLADDER SCAN          Today's  Medication Changes          These changes are accurate as of: 1/27/17  3:45 PM.  If you have any questions, ask your nurse or doctor.               Start taking these medicines.        Dose/Directions    ciprofloxacin 500 MG tablet   Commonly known as:  CIPRO   Used for:  Elevated prostate specific antigen (PSA)   Started by:  Dolly Granda MD        Dose:  500 mg   Take 1 tablet (500 mg) by mouth 2 times daily   Quantity:  6 tablet   Refills:  0       tamsulosin 0.4 MG capsule   Commonly known as:  FLOMAX   Used for:  Elevated prostate specific antigen (PSA)   Started by:  Dolly Granda MD        Dose:  0.4 mg   Take 1 capsule (0.4 mg) by mouth daily   Quantity:  90 capsule   Refills:  3            Where to get your medicines      These medications were sent to [id number] [address]     Phone:  [phone]    - ciprofloxacin 500 MG tablet  - tamsulosin 0.4 MG capsule             Primary Care Provider Office Phone # Fax #    Juani SRIKANTH Choudhary [phone]       [facility] [address] [facility] [address]        Thank you!     Thank you for choosing [facility] UROLOGY AND [facility] FOR PROSTATE AND UROLOGIC CANCERS  for your care. Our goal is always to provide you with excellent care. Hearing back from our patients is one way we can continue to improve our services. Please take a few minutes to complete the written survey that you may receive in the mail after your visit with us. Thank you!             Your Updated Medication List - Protect others around you: Learn how to safely use, store and throw away your medicines at www.disposemymeds.org.          This list is accurate as of: 1/27/17  3:45 PM.  Always use your most recent med list.                   Brand Name Dispense Instructions for use    ARTIFICIAL TEARS OP      Apply 1 drop to eye as needed        ciprofloxacin 500 MG tablet    CIPRO    6 tablet    Take 1 tablet (500 mg) by mouth 2 times daily       clotrimazole 1 % solution    LOTRIMIN    30 mL    5 drops to the right ear twice a day for 21 days- or until  Surgery.       oxybutynin 5 MG 24 hr tablet    DITROPAN-XL    30 tablet    Take 1 tablet (5 mg) by mouth At Bedtime       tamsulosin 0.4 MG capsule    FLOMAX    90 capsule    Take 1 capsule (0.4 mg) by mouth daily

## 2017-01-27 NOTE — Clinical Note
"1/27/2017       RE: Shayne Fiore  [address]   [facility] [address]     Dear Colleague,    Thank you for referring your patient, Shayne Fiore, to the [facility] UROLOGY AND INST FOR PROSTATE AND UROLOGIC CANCERS at [facility]. Please see a copy of my visit note below.    Office Visit Note      UROLOGIC DIAGNOSES:   Elevated PSA, lower urinary tract symptoms      CURRENT INTERVENTIONS:       HISTORY:     Mr. 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  He presents from [facility], accompanied by a professional  and two sons.  They are speaking Slovenian.    -Three years ago saw a urologist and was given a medicine - tamsulosin. He thinks this might have worked, but is not currently taking this rx.   Patient was given rx for oxybutinin but patient has stopped taking it as it was not effective and he did not like the side effects.   -Today c/o frequent urination (\"often\"), nocturia x 5, and urge incontinence    - No hesitancy or intermittency or straining. Feels he probably empties  - No dysuria  - Does have h/o sudden fevers with high temperatures and worsening of urine control.  Upon further questioning he admits this may have been a urinary tract infection.     - Denies stones.  Denies hematuria (although is blind so he might not notice)    Traveled here from [facility].  Prior to arrival had PSA= 14 - doesn't have this paperwork.       Patient had MRI prostate today, pending final reading.     PAST MEDICAL HISTORY: No past medical history on file.    PAST SURGICAL HISTORY:   Past Surgical History   Procedure Laterality Date     Eye surgery Right      x 2, unsure what type of surgery     Scrotal hematoma Right        FAMILY HISTORY:   Family History   Problem Relation Age of Onset     Glaucoma Sister      Macular Degeneration No family hx of      Glaucoma Cousin        SOCIAL HISTORY:   Social History "   Substance Use Topics     Smoking status: Never Smoker      Smokeless tobacco: Never Used     Alcohol Use: Not on file       Current Outpatient Prescriptions   Medication     clotrimazole (LOTRIMIN) 1 % solution     Hypromellose (ARTIFICIAL TEARS OP)     oxybutynin (DITROPAN-XL) 5 MG 24 hr tablet     No current facility-administered medications for this visit.         PHYSICAL EXAM:    /70 mmHg  Pulse 76  Ht 1.829 m (6')  Wt 61.054 kg (134 lb 9.6 oz)  BMI 18.25 kg/m2    HEENT: Normocephalic and atraumatic   Cardiac: Not done  Back/Flank: Not done  CNS/PNS: Not done  Respiratory: Normal non-labored breathing  Abdomen: Soft nontender and nondistended  Peripheral Vascular: Not done  Mental Status: Not done    Penis: Not done  Scrotal Skin: Not done  Testicles: Not done  Epididymis: Not done  Digital Rectal Exam:     Cystoscopy: Not done    Imaging: None    Urinalysis: UA RESULTS:  Recent Labs   Lab Test  12/13/16   1700   COLOR  Yellow   APPEARANCE  Slightly Cloudy   URINEGLC  Negative   URINEBILI  Negative   URINEKETONE  Negative   SG  1.018   UBLD  Small*   URINEPH  5.0   PROTEIN  Negative   NITRITE  Negative   LEUKEST  Trace*   RBCU  <1   WBCU  2       PSA: 18.87    Post Void Residual:     Other labs: None today      IMPRESSION:  68 y/o M with elevated PSA     PLAN:  Re-start tamsulosin as patient states is was working in the past   Continue d/c oxybutinin (patient self d/c)   Schedule for MRI guided prostate biopsy next available   Will consider options for lower urinary tract symptoms pending prostate biopsy     Total Time: 15 minutes                                       Total in Consultation: greater than 50%       Again, thank you for allowing me to participate in the care of your patient.      Sincerely,    Dolly Granda MD

## 2017-02-03 ENCOUNTER — OFFICE VISIT (OUTPATIENT)
Dept: OPHTHALMOLOGY | Facility: CLINIC | Age: 67
End: 2017-02-03
Attending: OPHTHALMOLOGY
Payer: COMMERCIAL

## 2017-02-03 DIAGNOSIS — T85.22XD DISLOCATED IOL (INTRAOCULAR LENS), SUBSEQUENT ENCOUNTER: Primary | ICD-10-CM

## 2017-02-03 PROCEDURE — 99213 OFFICE O/P EST LOW 20 MIN: CPT | Mod: ZF

## 2017-02-03 RX ORDER — MINERAL OIL, PETROLATUM 425; 573 MG/G; MG/G
OINTMENT OPHTHALMIC
COMMUNITY
End: 2017-03-30

## 2017-02-03 ASSESSMENT — VISUAL ACUITY
OD_SC+: -1
OD_SC: 20/100
METHOD: SNELLEN - LINEAR
OS_SC: NLP

## 2017-02-03 ASSESSMENT — SLIT LAMP EXAM - LIDS
COMMENTS: NORMAL
COMMENTS: NORMAL

## 2017-02-03 ASSESSMENT — TONOMETRY
OD_IOP_MMHG: 09
OS_IOP_MMHG: 50
IOP_METHOD: ICARE

## 2017-02-03 ASSESSMENT — CUP TO DISC RATIO: OD_RATIO: 0.9

## 2017-02-03 NOTE — PROGRESS NOTES
CC: followup for subluxed lens RE / dry eye    Pt cannot hear well. Son reports poor vision, worsening last 1.5 yr, told IOL was dislocated. Surgery in [place].    plugs placed last visit  Have been doing frequent preservative free artificial tears     A/P:    1. Dry eye OD > OS.  2. Pterygium LE.  3. Advanced glaucoma RE.  4. NLP OS.  5. Dislocated intraocular lens RE.  Appears to be 3 piece, may be rigid PMMA.    Surface much better  Ready to proceed with intraocular lens exchange  Anterior chamber intraocular lens (ACIOL) vs sutured, depending on surgical course    Counseled family re: plan    ~~~~~~~~~~~~~~~~~~~~~~~~~~~~~~~~~~~~~~~~~~~~~~~~~~~~~~~~~~~~~~~~      Isai Varma MD, MA  Director, Cornea & Anterior Segment  [facility] Department of Ophthalmology & Visual Neuroscience

## 2017-02-03 NOTE — NURSING NOTE
Chief Complaints and History of Present Illnesses   Patient presents with     Follow Up For     Cornea     HPI    Affected eye(s):  Both   Symptoms:     No blurred vision   No decreased vision         Do you have eye pain now?:  No      Comments:  Pt's son's state nothing has changed with vision but eyes look better as they have been using tears.  Flora FAJARDO 8:09 AM February 3, 2017

## 2017-02-06 ENCOUNTER — TELEPHONE (OUTPATIENT)
Dept: OPHTHALMOLOGY | Facility: CLINIC | Age: 67
End: 2017-02-06

## 2017-02-08 ENCOUNTER — OFFICE VISIT (OUTPATIENT)
Dept: AUDIOLOGY | Facility: CLINIC | Age: 67
End: 2017-02-08

## 2017-02-08 DIAGNOSIS — H90.8 MIXED HEARING LOSS: Primary | ICD-10-CM

## 2017-02-08 DIAGNOSIS — H90.71 MIXED HEARING LOSS OF RIGHT EAR: ICD-10-CM

## 2017-02-08 DIAGNOSIS — H90.5 SENSORINEURAL HEARING LOSS OF LEFT EAR: ICD-10-CM

## 2017-02-08 NOTE — MR AVS SNAPSHOT
After Visit Summary   2/8/2017    Shayne Fiore    MRN: [id number]           Patient Information     Date Of Birth          1950        Visit Information        Provider Department      2/8/2017 2:00 PM Wilda Delcid; Scarlett Sanchez AUD M [facility] Audiology         Follow-ups after your visit        Your next 10 appointments already scheduled     Feb 16, 2017  3:30 PM   Post-Op with Isai Varma MD   Eye Clinic ([facility])    [address]  [address] [address]   [phone]            Feb 23, 2017 11:15 AM   (Arrive by 11:00 AM)   Return Visit with Felicia Pereira MD   [facility] Ear Nose and Throat ([facility])    [address] [place] [address]   [phone]            Feb 23, 2017  3:30 PM   Post-Op with Isai Varma MD   Eye Clinic ([facility])    [address]  [address] Clin [address]a  [facility] [address]   [phone]            Feb 24, 2017  8:30 AM   (Arrive by 8:15 AM)   Sonography/Biopsy with Dolly Granda MD   [facility] Urology and [facility] for Prostate and Urologic Cancers ([facility])    [address] [place] [address]   [phone]            Mar 03, 2017  2:30 PM   Hearing Aid Fitting with GERMAN Moran [facility] Audiology ([facility])    [address] [place] [address]   [phone]            Mar 28, 2017  2:00 PM   (Arrive by 1:45 PM)   Initial Review Program with GERMAN Moran [facility] Audiology ([facility])    [address] [place] [address]   [phone]            Apr 10, 2017 12:00 PM   (Arrive by 11:45 AM)   PAC PHONE RN ASSESSMENT with  Pac Rn   [facility] Preoperative Assessment Center ([facility])    [address] [place] [address]  [facility]  4th [facility] [address] [phone]           Note: this is not an onsite visit; there is no need to come to the facility.            Apr 12, 2017   Procedure with Felicia Pereira MD   [facility] Surgery and Procedure Center ([facility])    [address] [place] [address]  5th [facility] [address] [phone]           Located in the Clinics and Surgery Center at [address] [place] [address].   parking is very convenient and highly recommended.  is a $6 flat rate fee; no tips accepted.  Both  and self parkers should enter the main arrival plaza from [facility]; parking attendants will direct you based on your parking preference.            Apr 12, 2017 11:40 AM   Test/Procedure with  Generic    Services Department ([facility])    [address] [place] [address]               May 03, 2017 10:30 AM   (Arrive by 10:15 AM)   Return Visit with Felicia Pereira MD   [facility] Ear Nose and Throat ([facility])    [address] [place] [address] [phone]              Who to contact     Please call your clinic at [phone] to:    Ask questions about your health    Make or cancel appointments    Discuss your medicines    Learn about your test results    Speak to your doctor   If you have compliments or concerns about an experience at your clinic, or if you wish to file a complaint, please contact [facility] Patient Relations at [phone] or email us at [email or web address]         Additional Information About Your Visit        LocalGuidinghart Information     Dogeo is an electronic gateway that provides easy, online access to your medical records. With Dogeo, you can request a clinic appointment, read your test results, renew a prescription or communicate with your care  team.     To sign up for Industry Weaponhart visit the website at [email or web address]   You will be asked to enter the access code listed below, as well as some personal information. Please follow the directions to create your username and password.     Your access code is: [email or web address]  Expires: 2017  6:31 AM     Your access code will  in 90 days. If you need help or a new code, please contact your [facility] or call [phone] for assistance.        Care EveryWhere ID     This is your Care EveryWhere ID. This could be used by other organizations to access your [place] medical records  [id number]         Blood Pressure from Last 3 Encounters:   17 133/70   16 148/73    Weight from Last 3 Encounters:   17 61.054 kg (134 lb 9.6 oz)   16 58.832 kg (129 lb 11.2 oz)              Today, you had the following     No orders found for display       Primary Care Provider Office Phone # Fax #    Juani Choudhary SRIKANTH [phone]       [facility]  [facility] [address]        Thank you!     Thank you for choosing [facility] AUDIOLOGY  for your care. Our goal is always to provide you with excellent care. Hearing back from our patients is one way we can continue to improve our services. Please take a few minutes to complete the written survey that you may receive in the mail after your visit with us. Thank you!             Your Updated Medication List - Protect others around you: Learn how to safely use, store and throw away your medicines at www.disposemymeds.org.          This list is accurate as of: 17  2:49 PM.  Always use your most recent med list.                   Brand Name Dispense Instructions for use    ARTIFICIAL TEARS OP      Apply 1 drop to eye as needed       ciprofloxacin 500 MG tablet    CIPRO    6 tablet    Take 1 tablet (500 mg) by mouth 2 times daily       clotrimazole 1 % solution    LOTRIMIN     30 mL    5 drops to the right ear twice a day for 21 days- or until  Surgery.       oxybutynin 5 MG 24 hr tablet    DITROPAN-XL    30 tablet    Take 1 tablet (5 mg) by mouth At Bedtime       REFRESH P.M. Oint      OU QHS       tamsulosin 0.4 MG capsule    FLOMAX    90 capsule    Take 1 capsule (0.4 mg) by mouth daily

## 2017-02-08 NOTE — PROGRESS NOTES
AUDIOLOGY REPORT    SUBJECTIVE: Shayne Fiore is a 67 year old male  was seen in the Audiology Clinic at was seen in Audiology at the [facility] on 2/08/17 to discuss concerns with hearing and functional communication difficulties. The patient was accompanied by their son and a [place] Islander . The patient has been seen previously on 1/5/17, and results revealed a left mild to profound sensorineural hearing loss, and a right profound rising to severe mixed hearing loss.  The patient was medically evaluated and determined to be cleared for a left hearing aid by Dr.Tina Pereira.  Patient has been scheduled for middle ear surgery in his right ear.  The patient notes difficulty with communication in a variety of listening situations.    OBJECTIVE:  Patient is a hearing aid candidate.  Patient would like to move forward with a hearing aid evaluation today. Discussed that the patient's insurance will cover the cost of his hearing aid.  The patient was presented with different options for amplification to help aid in communication. Discussed styles, levels of technology and monaural vs. binaural fitting.  Because patient has vision problems, he prefers that his hearing aid be easy to manipulate.    The hearing aid mutually chosen was:  Left Unitron Stride 700 ITE  COLOR: brown   BATTERY SIZE: 13  EARMOLD: full shell, wax guard (son can help change wax traps), removal string and notch    Otoscopy revealed left ear clear of cerumen. A left earmold impression was taken without incident.    ASSESSMENT:   No diagnosis found.    Reviewed purchase information and warranty information with patient. The 45 day trial period was explained to patient.The patient was given a copy of the [facility] consumer brochure on purchasing hearing instruments. Hearing aid ordered. Hearing aid evaluation completed.    PLAN:  Patient is scheduled to return in 2-3 weeks for a  hearing aid fitting and programming. Purchase agreement will be completed on that date. Please contact this clinic with any questions or concerns.        Fernanda Anderson, [facility]  Licensed Audiologist  [place] #[id number]

## 2017-02-13 ENCOUNTER — TELEPHONE (OUTPATIENT)
Dept: OPHTHALMOLOGY | Facility: CLINIC | Age: 67
End: 2017-02-13

## 2017-02-14 ENCOUNTER — APPOINTMENT (OUTPATIENT)
Dept: OPHTHALMOLOGY | Facility: CLINIC | Age: 67
End: 2017-02-14
Attending: OPHTHALMOLOGY
Payer: COMMERCIAL

## 2017-02-14 PROCEDURE — 25000132 ZZH RX MED GY IP 250 OP 250 PS 637: Mod: ZF

## 2017-02-15 ENCOUNTER — PRE VISIT (OUTPATIENT)
Dept: UROLOGY | Facility: CLINIC | Age: 67
End: 2017-02-15

## 2017-02-15 ENCOUNTER — TRANSFERRED RECORDS (OUTPATIENT)
Dept: HEALTH INFORMATION MANAGEMENT | Facility: CLINIC | Age: 67
End: 2017-02-15

## 2017-02-15 NOTE — TELEPHONE ENCOUNTER
Patient coming in for prostate biopsy. Patient contacted regarding prep needed to be done prior to biopsy. This includes stopping all blood thinners for 1 week prior to biopsy, obtaining and administering fleets enema 2 hours prior to biopsy. Patient will also receive 6 antibiotics. Patient stated understanding and had no further questions

## 2017-02-16 ENCOUNTER — OFFICE VISIT (OUTPATIENT)
Dept: OPHTHALMOLOGY | Facility: CLINIC | Age: 67
End: 2017-02-16
Attending: OPHTHALMOLOGY
Payer: COMMERCIAL

## 2017-02-16 DIAGNOSIS — Z48.810 AFTERCARE FOLLOWING SURGERY OF A SENSORY ORGAN: Primary | ICD-10-CM

## 2017-02-16 PROCEDURE — 99213 OFFICE O/P EST LOW 20 MIN: CPT | Mod: ZF

## 2017-02-16 ASSESSMENT — VISUAL ACUITY
OD_SC: 20/200
OD_PH_SC: 20/100
METHOD: SNELLEN - LINEAR
OS_SC: NLP

## 2017-02-16 ASSESSMENT — SLIT LAMP EXAM - LIDS
COMMENTS: NORMAL
COMMENTS: NORMAL

## 2017-02-16 ASSESSMENT — TONOMETRY
OD_IOP_MMHG: 10
OS_IOP_MMHG: 38
IOP_METHOD: ICARE

## 2017-02-16 NOTE — MR AVS SNAPSHOT
After Visit Summary   2/16/2017    Shayne Fiore    MRN: [id number]           Patient Information     Date Of Birth          1950        Visit Information        Provider Department      2/16/2017 3:15 PM Philipp Posada; Isai Varma MD [facility] Clinic        Today's Diagnoses     Aftercare following surgery of a sensory organ    -  1       Follow-ups after your visit        Your next 10 appointments already scheduled     Feb 23, 2017 11:15 AM CST   (Arrive by 11:00 AM)   Return Visit with Felicia Pereira MD   [facility] Ear Nose and Throat ([facility])    [address] [place] [address]   [phone]            Feb 23, 2017  3:30 PM CST   Post-Op with Isai Varma MD   Eye Clinic ([facility])    [facility] [address]  [facility] [address]   [phone]            Feb 24, 2017  8:30 AM CST   (Arrive by 8:15 AM)   Sonography/Biopsy with Dolly Granda MD   [facility]rology [facility] Inst for Prostate and Urologic Cancers ([facility])    [address] [place] [address] [phone]            Mar 03, 2017  2:30 PM CST   Hearing Aid Fitting with Pravin Moran [facility] ([facility])    [address] [place] [address]   [phone]            Mar 28, 2017  2:00 PM CDT   (Arrive by 1:45 PM)   Initial Review Program with Pravin Moran [facility] ([facility])    [address] [place] [address]   [phone]            Apr 10, 2017 12:00 PM CDT   (Arrive by 11:45 AM)   PAC PHONE RN ASSESSMENT with Imelda Pac Rn   [facility] Preoperative Assessment Center ([facility])    [address] [place] [address] [phone]           Note: this is not an onsite  visit; there is no need to come to the facility.            Apr 12, 2017   Procedure with Felicia Pereira MD   [facility] ([facility])    [address] [place] [address]  5th [facility] [address] [phone]           Located in the Clinics and Surgery Center at [address] [place] [address].   parking is very convenient and highly recommended.  is a $6 flat rate fee; no tips accepted.  Both  and self parkers should enter the main arrival plaza from [facility]; parking attendants will direct you based on your parking preference.            Apr 12, 2017 11:40 AM CDT   Test/Procedure with  Generic    Services Department ([facility])    [address] [place] [address]               May 03, 2017 10:30 AM CDT   (Arrive by 10:15 AM)   Return Visit with Felicia Pereira MD   [facility]ose [facility] Throat ([facility])    [address] [place] [address]  4th [facility] [address] [phone]              Who to contact     Please call your clinic at [phone] to:    Ask questions about your health    Make or cancel appointments    Discuss your medicines    Learn about your test results    Speak to your doctor   If you have compliments or concerns about an experience at your clinic, or if you wish to file a complaint, please contact [facility] Patient Relations at [phone] or email us at [email or web address]         Additional Information About Your Visit        The Kernelhart Information     Ac[employer] is an electronic gateway that provides easy, online access to your medical records. With [employer], you can request a clinic appointment, read your test results, renew a prescription or communicate with your care team.     To sign up for Ac[employer] Living visit the website at [email or web address]   You will  be asked to enter the access code listed below, as well as some personal information. Please follow the directions to create your username and password.     Your access code is: [email or web address]  Expires: 2017  6:31 AM     Your access code will  in 90 days. If you need help or a new code, please contact your [facility] or call [phone] for assistance.        Care EveryWhere ID     This is your Care EveryWhere ID. This could be used by other organizations to access your [facility] medical records  [id number]         Blood Pressure from Last 3 Encounters:   17 133/70   16 148/73    Weight from Last 3 Encounters:   17 61.1 kg (134 lb 9.6 oz)   16 58.8 kg (129 lb 11.2 oz)              Today, you had the following     No orders found for display       Primary Care Provider Office Phone # Fax #    Juani GroveSRIKANTH zee [phone]       [facility]  [facility] [address]        Thank you!     Thank you for choosing EYE CLINIC  for your care. Our goal is always to provide you with excellent care. Hearing back from our patients is one way we can continue to improve our services. Please take a few minutes to complete the written survey that you may receive in the mail after your visit with us. Thank you!             Your Updated Medication List - Protect others around you: Learn how to safely use, store and throw away your medicines at www.disposemymeds.org.          This list is accurate as of: 17  4:07 PM.  Always use your most recent med list.                   Brand Name Dispense Instructions for use    ARTIFICIAL TEARS OP      Apply 1 drop to eye as needed       ciprofloxacin 500 MG tablet    CIPRO    6 tablet    Take 1 tablet (500 mg) by mouth 2 times daily       oxybutynin 5 MG 24 hr tablet    DITROPAN-XL    30 tablet    Take 1 tablet (5 mg) by mouth At Bedtime       REFRESH P.M. Oint      OU QHS        tamsulosin 0.4 MG capsule    FLOMAX    90 capsule    Take 1 capsule (0.4 mg) by mouth daily

## 2017-02-16 NOTE — PROGRESS NOTES
CC: Post op right eye    No eye pain. Vision seems about the same so far.    A/P:    1. POD #1 IOL explantation/ant vit/ACIOL right eye (2/15/17).    Vision limited by advanced glaucoma.    Prednisolone four times a day right eye  Ofloxacin four times a day right eye  Shield while sleeping  F/u 1 week    ~~~~~~~~~~~~~~~~~~~~~~~~~~~~~~~~~~~~~~~~~~~~~~~~~~~~~~~~~~~~~~~~    I have personally examined the patient and agree with the assessment and plan as delineated by the resident / fellow.  I have confirmed the contents of the chief complaint, history of present illness, review of systems, and medical / surgical history sections and edited the note as needed.    Isai Varma MD, MA  Director, Cornea & Anterior Segment  [facility] Department of Ophthalmology & Visual Neuroscience

## 2017-02-20 LAB
FUNGUS SPEC CULT: ABNORMAL
MICRO REPORT STATUS: ABNORMAL
SPECIMEN SOURCE: ABNORMAL

## 2017-02-23 ENCOUNTER — OFFICE VISIT (OUTPATIENT)
Dept: OTOLARYNGOLOGY | Facility: CLINIC | Age: 67
End: 2017-02-23

## 2017-02-23 ENCOUNTER — OFFICE VISIT (OUTPATIENT)
Dept: OPHTHALMOLOGY | Facility: CLINIC | Age: 67
End: 2017-02-23
Attending: OPHTHALMOLOGY
Payer: COMMERCIAL

## 2017-02-23 VITALS — HEIGHT: 74 IN | BODY MASS INDEX: 17.45 KG/M2 | WEIGHT: 136 LBS

## 2017-02-23 DIAGNOSIS — Z48.810 AFTERCARE FOLLOWING SURGERY OF A SENSORY ORGAN: Primary | ICD-10-CM

## 2017-02-23 DIAGNOSIS — H72.91 PERFORATION OF TYMPANIC MEMBRANE, RIGHT: Primary | ICD-10-CM

## 2017-02-23 DIAGNOSIS — H92.11 OTORRHEA, RIGHT: ICD-10-CM

## 2017-02-23 PROCEDURE — 99213 OFFICE O/P EST LOW 20 MIN: CPT | Mod: ZF

## 2017-02-23 PROCEDURE — T1013 SIGN LANG/ORAL INTERPRETER: HCPCS | Mod: U3,ZF

## 2017-02-23 RX ORDER — OFLOXACIN 3 MG/ML
1 SOLUTION/ DROPS OPHTHALMIC
COMMUNITY
Start: 2017-02-15 | End: 2017-03-30

## 2017-02-23 ASSESSMENT — TONOMETRY
IOP_METHOD: ICARE
OS_IOP_MMHG: 35
OD_IOP_MMHG: 05

## 2017-02-23 ASSESSMENT — SLIT LAMP EXAM - LIDS
COMMENTS: NORMAL
COMMENTS: NORMAL

## 2017-02-23 ASSESSMENT — VISUAL ACUITY
OD_SC: 20/200
OD_PH_SC: 20/125
OS_SC: NLP
METHOD: SNELLEN - LINEAR

## 2017-02-23 ASSESSMENT — CUP TO DISC RATIO: OD_RATIO: 0.7

## 2017-02-23 ASSESSMENT — PAIN SCALES - GENERAL: PAINLEVEL: NO PAIN (0)

## 2017-02-23 NOTE — LETTER
2/23/2017      RE: Shayne Fiore  [address] [place] [address] W   [facility] [address]       Shayne Fiore is seen for a right ear check.  He has a perforation and is scheduled for a cartilage backed tympanoplasty in April.  However, he was noted to have a candidal infection and was placed on clotrimazole drops.  He has been using the drops and has not noticed much difference with his ear as he had not been having any otorrhea or otalgia.    Physical examination:  male in no acute distress.  Alert and answering questions appropriately.  HB 1/6 bilaterally.  Right ear examined under the microscope.  Ear canal clear today with no fungal elements noted, stable 50% inferior perforation, no fungal elements noted on the TM or middle ear, middle ear still moist and this was cultured.    Assessment and plan:  Improved appearance of the ear.  We asked him to continue the clotrimazole until our repeat culture results are back and we will call with those results.  He should continue his dry ear precautions.      Felicia Pereira MD

## 2017-02-23 NOTE — PROGRESS NOTES
CC: Post op right eye    No eye pain. Vision seems about the same so far.    A/P:    1. POW #1 IOL explantation/ant vit/ACIOL right eye (2/15/17).    Vision limited by advanced glaucoma.    Prednisolone four times a day right eye  Stop ofloxacin   Shield while sleeping  F/u 1 month    ~~~~~~~~~~~~~~~~~~~~~~~~~~~~~~~~~~~~~~~~~~~~~~~~~~~~~~~~~~~~~~~~    Complete documentation of historical and exam elements from today's encounter can be found in the full encounter summary report (not reduplicated in this progress note). I personally obtained the chief complaint(s) and history of present illness.  I confirmed and edited as necessary the review of systems, past medical/surgical history, family history, social history, and examination findings as documented by others.  I examined the patient myself, and I personally reviewed the relevant tests, images, and reports as documented above. I formulated and edited as necessary the assessment and plan and discussed the findings and management plan with the patient and family.     Isai Varma MD, MA  Director, Cornea & Anterior Segment  [facility] Department of Ophthalmology & Visual Neuroscience

## 2017-02-23 NOTE — NURSING NOTE
Chief Complaints and History of Present Illnesses   Patient presents with     Post Op (Ophthalmology) Right Eye     1 week POP IOL explantation/ant vit/ACIOL right eye (2/15/17).     HPI    Affected eye(s):  Right   Symptoms:     No decreased vision   No floaters   No flashes      Duration:  1 week      Do you have eye pain now?:  Yes   Location:  OD   Other:  when pt moves head      Comments:  1 week POP IOL explantation/ant vit/ACIOL right eye (2/15/17)  Pt notes some pain in the eye when he moves his head - pt wearing shield continuously  Pt states vision has improved some    Ocular meds:  Prednisolone four times a day right eye  Ofloxacin four times a day right eye    Alva FAJARDO 3:47 PM February 23, 2017

## 2017-02-23 NOTE — MR AVS SNAPSHOT
After Visit Summary   2/23/2017    Shayne Fiore    MRN: [id number]           Patient Information     Date Of Birth          1950        Visit Information        Provider Department      2/23/2017 3:15 PM Nelson Flores; Isai Varma MD Eye Clinic        Today's Diagnoses     Aftercare following surgery of a sensory organ    -  1       Follow-ups after your visit        Your next 10 appointments already scheduled     Feb 24, 2017  8:30 AM CST   Sonography/Biopsy with Dolly Granda MD, Nelson Flores   [facility] [place] Urology and Inst for Prostate and Urologic Cancers ([facility])    [address] [place] [address]  4th [facility] [address] [phone]            Mar 03, 2017  2:30 PM CST   Hearing Aid Fitting with Pravin Moran [facility] ([facility])    [address] [place] [address]  4[address] [facility] [address]   [phone]            Mar 28, 2017  2:00 PM CDT   (Arrive by 1:45 PM)   Initial Review Program with Pravin Moran   [facility] [place] [facility] ([facility])    [address] [place] [address]  4th [facility] [id number] [phone]            Mar 30, 2017  3:00 PM CDT   Post-Op with Isai Varma MD   Eye Clinic ([facility])    [address] Clin [address]a  [facility] [address]   [phone]            Apr 10, 2017 12:00 PM CDT   (Arrive by 11:45 AM)   PAC PHONE RN ASSESSMENT with  Pac Rn   [facility] [place] Preoperative Assessment Center ([facility])    [address] [place] [address]  [address] [facility] [id number] [phone]           Note: this is not an onsite visit; there is no need to come to the facility.            Apr 12, 2017   Procedure with Felicia Pereira MD   [facility] [place] [facility] ([facility])    [address] [place] [address]  5th [facility] [address]    [phone]           Located in the Clinics and Surgery Center at [address] [place] [address].   parking is very convenient and highly recommended.  is a $6 flat rate fee; no tips accepted.  Both  and self parkers should enter the main arrival plaza from [facility]; parking attendants will direct you based on your parking preference.            Apr 12, 2017 11:40 AM CDT   Test/Procedure with  Generic    Services Department ([facility])    [address] [place] [address]               May 03, 2017 10:30 AM CDT   (Arrive by 10:15 AM)   Return Visit with Felicia Pereira MD   [facility] Ear Nose and Throat ([facility])    [address] [place] [address] [phone]              Who to contact     Please call your clinic at [phone] to:    Ask questions about your health    Make or cancel appointments    Discuss your medicines    Learn about your test results    Speak to your doctor   If you have compliments or concerns about an experience at your clinic, or if you wish to file a complaint, please contact [facility] Patient Relations at [phone] or email us at [email or web address]         Additional Information About Your Visit        agÃƒÂ¡mi SystemsharRepairy Information     Facishare is an electronic gateway that provides easy, online access to your medical records. With Facishare, you can request a clinic appointment, read your test results, renew a prescription or communicate with your care team.     To sign up for Compliance Innovationst visit the website at [email or web address]   You will be asked to enter the access code listed below, as well as some personal information. Please follow the directions to create your username and password.     Your access code is: [email or web address]  Expires: 2/27/2017  6:31 AM     Your access code will   in 90 days. If you need help or a new code, please contact your [facility] or call [phone] for assistance.        Care EveryWhere ID     This is your Care EveryWhere ID. This could be used by other organizations to access your [facility] medical records  [id number]         Blood Pressure from Last 3 Encounters:   17 133/70   16 148/73    Weight from Last 3 Encounters:   17 61.7 kg (136 lb)   17 61.1 kg (134 lb 9.6 oz)   16 58.8 kg (129 lb 11.2 oz)              Today, you had the following     No orders found for display       Primary Care Provider Office Phone # Fax #    Juani GroveSRIKANTH zee [phone]       [facility]  [facility] [place] [place] [address]        Thank you!     Thank you for choosing EYE CLINIC  for your care. Our goal is always to provide you with excellent care. Hearing back from our patients is one way we can continue to improve our services. Please take a few minutes to complete the written survey that you may receive in the mail after your visit with us. Thank you!             Your Updated Medication List - Protect others around you: Learn how to safely use, store and throw away your medicines at www.disposemymeds.org.          This list is accurate as of: 17  4:17 PM.  Always use your most recent med list.                   Brand Name Dispense Instructions for use    ARTIFICIAL TEARS OP      Apply 1 drop to eye as needed       ciprofloxacin 500 MG tablet    CIPRO    6 tablet    Take 1 tablet (500 mg) by mouth 2 times daily       ofloxacin 0.3 % ophthalmic solution    OCUFLOX     Apply 1 drop to eye       oxybutynin 5 MG 24 hr tablet    DITROPAN-XL    30 tablet    Take 1 tablet (5 mg) by mouth At Bedtime       REFRESH P.M. Oint      OU QHS       tamsulosin 0.4 MG capsule    FLOMAX    90 capsule    Take 1 capsule (0.4 mg) by mouth daily

## 2017-02-23 NOTE — NURSING NOTE
Chief Complaint   Patient presents with     Consult     hearing loss     Char Skelton Medical Assistant

## 2017-02-23 NOTE — PATIENT INSTRUCTIONS
You will be called with the results of the ear culture.   Please call our clinic for any questions,concerns,or worsening symptoms.      Clinic #[phone]       Option 3  for the triage nurse.

## 2017-02-23 NOTE — MR AVS SNAPSHOT
After Visit Summary   2/23/2017    Shayne Fiore    MRN: [id number]           Patient Information     Date Of Birth          1950        Visit Information        Provider Department      2/23/2017 11:00 AM Philipp Posada; Felicia Pereira MD [facility] Ear Nose and Throat        Today's Diagnoses     Perforation of tympanic membrane, right    -  1    Otorrhea, right          Care Instructions      You will be called with the results of the ear culture.   Please call our clinic for any questions,concerns,or worsening symptoms.      Clinic #[phone]       Option 3  for the triage nurse.        Follow-ups after your visit        Your next 10 appointments already scheduled     Mar 10, 2017  3:15 PM CST   (Arrive by 3:00 PM)   Return Visit with Dolly Granda MD   [facility] Urology and Inst for Prostate and Urologic Cancers ([facility] [place])    [address] [place] [id number] [phone]            Mar 28, 2017  2:00 PM CDT   (Arrive by 1:45 PM)   Initial Review Program with Pravin Moran   [facility]iology ([facility] [place])    [address] [place] [address] [phone]            Mar 30, 2017  3:00 PM CDT   Post-Op with Isai Varma MD   Eye Clinic ([facility])    [address] Taylor [address] Clin [address]  [facility] [address]   [phone]            Apr 10, 2017 12:00 PM CDT   (Arrive by 11:45 AM)   PAC PHONE RN ASSESSMENT with Uc Pac Rn   [facility] Preoperative Assessment Center ([facility] [place])    [address] [place] [phone] [phone]           Note: this is not an onsite visit; there is no need to come to the facility.            Apr 12, 2017   Procedure with Felicia Peerira MD   [facility] ([facility] [place])    [address] [place] [address]  [facility] [address] [phone]           Located in the [facility] Center at [address] [place] [address].   parking is very convenient and highly recommended.  is a $6 flat rate fee.  Both  and self parkers should enter the main arrival plaza from [facility]; parking attendants will direct you based on your parking preference.            Apr 12, 2017 11:40 AM CDT   Test/Procedure with  Generic    Services Department ([facility])    [address] [place] [address]               May 03, 2017 10:30 AM CDT   (Arrive by 10:15 AM)   Return Visit with Felicia Pereira MD   [facility] Ear Nose and Throat ([facility] [place])    [address] [place] [address] [phone]              Who to contact     Please call your clinic at [phone] to:    Ask questions about your health    Make or cancel appointments    Discuss your medicines    Learn about your test results    Speak to your doctor   If you have compliments or concerns about an experience at your clinic, or if you wish to file a complaint, please contact [facility] Patient Relations at [phone] or email us at [email or web address]         Additional Information About Your Visit        MyChart Information     MeetDoctor is an electronic gateway that provides easy, online access to your medical records. With MeetDoctor, you can request a clinic appointment, read your test results, renew a prescription or communicate with your care team.     To sign up for Power Fingerprintingt visit the website at [email or web address]   You will be asked to enter the access code listed below, as well as some personal information. Please follow the directions to create your username and password.     Your access code is: [email or web address]  Expires: 5/31/2017  6:30 AM     Your  "access code will  in 90 days. If you need help or a new code, please contact your [facility] or call [phone] for assistance.        Care EveryWhere ID     This is your Care EveryWhere ID. This could be used by other organizations to access your [facility] medical records  [id number]        Your Vitals Were     Height BMI (Body Mass Index)                1.867 m (6' 1.5\") 17.7 kg/m2           Blood Pressure from Last 3 Encounters:   17 165/67   17 133/70   16 148/73    Weight from Last 3 Encounters:   17 61.6 kg (135 lb 12.8 oz)   17 61.7 kg (136 lb)   17 61.1 kg (134 lb 9.6 oz)              We Performed the Following     BINOCULAR MICROSCOPY     Ear Culture Aerobic Bacterial     Fungus Culture, non-blood        Primary Care Provider Office Phone # Fax #    Juani GroveSRIKANTH zee [phone]       [facility]  [facility] [address]        Thank you!     Thank you for choosing [facility] EAR NOSE AND THROAT  for your care. Our goal is always to provide you with excellent care. Hearing back from our patients is one way we can continue to improve our services. Please take a few minutes to complete the written survey that you may receive in the mail after your visit with us. Thank you!             Your Updated Medication List - Protect others around you: Learn how to safely use, store and throw away your medicines at www.disposemymeds.org.          This list is accurate as of: 17 11:59 PM.  Always use your most recent med list.                   Brand Name Dispense Instructions for use    ARTIFICIAL TEARS OP      Apply 1 drop to eye as needed       ciprofloxacin 500 MG tablet    CIPRO    6 tablet    Take 1 tablet (500 mg) by mouth 2 times daily       ofloxacin 0.3 % ophthalmic solution    OCUFLOX     Apply 1 drop to eye       oxybutynin 5 MG 24 hr tablet    DITROPAN-XL    30 tablet    Take 1 " tablet (5 mg) by mouth At Bedtime       REFRESH P.M. Oint      OU QHS       tamsulosin 0.4 MG capsule    FLOMAX    90 capsule    Take 1 capsule (0.4 mg) by mouth daily

## 2017-02-24 ENCOUNTER — OFFICE VISIT (OUTPATIENT)
Dept: UROLOGY | Facility: CLINIC | Age: 67
End: 2017-02-24

## 2017-02-24 ENCOUNTER — TELEPHONE (OUTPATIENT)
Dept: OPHTHALMOLOGY | Facility: CLINIC | Age: 67
End: 2017-02-24

## 2017-02-24 VITALS
WEIGHT: 135.8 LBS | BODY MASS INDEX: 17.43 KG/M2 | HEART RATE: 51 BPM | SYSTOLIC BLOOD PRESSURE: 165 MMHG | DIASTOLIC BLOOD PRESSURE: 67 MMHG | HEIGHT: 74 IN

## 2017-02-24 DIAGNOSIS — R97.20 ELEVATED PROSTATE SPECIFIC ANTIGEN (PSA): Primary | ICD-10-CM

## 2017-02-24 RX ORDER — CIPROFLOXACIN 500 MG/1
500 TABLET, FILM COATED ORAL 2 TIMES DAILY
Qty: 1 TABLET | Refills: 0 | Status: SHIPPED | OUTPATIENT
Start: 2017-02-24 | End: 2017-04-07

## 2017-02-24 ASSESSMENT — PAIN SCALES - GENERAL
PAINLEVEL: NO PAIN (0)
PAINLEVEL: NO PAIN (0)

## 2017-02-24 NOTE — TELEPHONE ENCOUNTER
No contraindications per dr. Varma for prostate biopsy today  Clinic aware  Nando Blue RN 9:42 AM 02/24/17

## 2017-02-24 NOTE — TELEPHONE ENCOUNTER
----- Message from Amirah Rosenbaum sent at 2/24/2017  9:29 AM CST -----  Regarding: INFO NEEDED ASAP -  PAGE  Contact: [phone]  Dr Granda' nurse stated they did not have a phone # to call about the biopsy - could you provide that again?   Please call the nurse at [phone],  Because there is a 3-5% chance the pt can go septic, they want to know if that will affect the pt's implant lens?     Thanks - amirah    Please DO NOT send this message and/or reply back to sender.  Call Center Representatives DO NOT respond to messages.

## 2017-02-24 NOTE — NURSING NOTE
Invasive Procedure Safety Checklist:    Procedure:     Action: Complete sections and checkboxes as appropriate.    Pre-procedure:  1. Patient ID Verified with 2 identifiers ([place] and  or MRN) : YES    2. Procedure and site verified with patient/designee (when able) : YES    3. Accurate consent documentation in medical record : YES    4. H&P (or appropriate assessment) documented in medical record : YES  H&P must be up to 30 days prior to procedure an updated within 24 hours of                 Procedure as applicable.     5. Relevant diagnostic and radiology test results appropriately labeled and displayed as applicable : YES    6. Blood products, implants, devices, and/or special equipment available for the procedure as applicable : YES    7. Procedure site(s) marked with provider initials [Exclusions: None] : NO    8. Marking not required. Reason : Yes  Procedure does not require site marking    Time Out:     Time-Out performed immediately prior to starting procedure, including verbal and active participation of all team members addressing: YES    1. Correct patient identity.  2. Confirmed that the correct side and site are marked.  3. An accurate procedure to be done.  4. Agreement on the procedure to be done.  5. Correct patient position.  6. Relevant images and results are properly labeled and appropriately displayed.  7. The need to administer antibiotics or fluids for irrigation purposes during the procedure as applicable.  8. Safety precautions based on patient history or medication use.    During Procedure: Verification of correct person, site, and procedure occurs any time the responsibility for care of the patient is transferred to another member of the care team.

## 2017-02-24 NOTE — LETTER
2/24/2017       RE: Shayne Fiore  [address]   [place] [address]     Dear Colleague,    Thank you for referring your patient, Shayne Fiore, to the [facility] UROLOGY AND INST FOR PROSTATE AND UROLOGIC CANCERS at [facility]. Please see a copy of my visit note below.    Here for an MRI-ultrasound-fusion guided biopsy of the prostate    We previously obtained an MRI of the prostate and identified all PIRADS 3-5 lesions for targeting    Target Lesion #1 left apex  Target Lesion #2 right base  Target Lesion #3 left mid  Target Lesion #4: right mid        PSA   Date Value Ref Range Status   12/13/2016 18.87 (H) 0 - 4 ug/L Final     Comment:     Assay Method:  Chemiluminescence using Siemens Vista analyzer       An enema was completed and 500 mg of Cipro was given prior to the biopsy.  After obtaining informed consent patient was placed in lateral decubitus position.  The ultrasound probe was placed in the rectum.  The prostate was numbed using ultrasound guidance with 1% lidocaine 5 mls along each nerve bundle.  The volume was measured and estimated to be 40 grams.  US images were obtained and then fused with the MRI images.  The fused images were then used to guide the biopsy of the targeted lesions with at least 2 cores taken of each lesion.  We then performed random biopsies.  12 cores were taken with 6 on each side, base, mid and apex.  The patient tolerated the procedure well.      Of note, we contacted ophthalmology clinic to be sure that timing of biopsy was appropriate. They approved.       Again, thank you for allowing me to participate in the care of your patient.      Sincerely,    Dolly Granda MD

## 2017-02-24 NOTE — PROGRESS NOTES
Here for an MRI-ultrasound-fusion guided biopsy of the prostate    We previously obtained an MRI of the prostate and identified all PIRADS 3-5 lesions for targeting    Target Lesion #1 left apex  Target Lesion #2 right base  Target Lesion #3 left mid  Target Lesion #4: right mid        PSA   Date Value Ref Range Status   12/13/2016 18.87 (H) 0 - 4 ug/L Final     Comment:     Assay Method:  Chemiluminescence using Siemens Vista analyzer       An enema was completed and 500 mg of Cipro was given prior to the biopsy.  After obtaining informed consent patient was placed in lateral decubitus position.  The ultrasound probe was placed in the rectum.  The prostate was numbed using ultrasound guidance with 1% lidocaine 5 mls along each nerve bundle.  The volume was measured and estimated to be 40 grams.  US images were obtained and then fused with the MRI images.  The fused images were then used to guide the biopsy of the targeted lesions with at least 2 cores taken of each lesion.  We then performed random biopsies.  12 cores were taken with 6 on each side, base, mid and apex.  The patient tolerated the procedure well.      Of note, we contacted ophthalmology clinic to be sure that timing of biopsy was appropriate. They approved.

## 2017-02-24 NOTE — MR AVS SNAPSHOT
After Visit Summary   2/24/2017    Shayne Fiore    MRN: [id number]           Patient Information     Date Of Birth          1950        Visit Information        Provider Department      2/24/2017 8:30 AM Nelson Flores; Dolly Granda MD [facility] Urology and [facility] for Prostate and Urologic Cancers        [facility] for Prostate and Urologic Cancers  Precautions Following a Prostate Biopsy    There are four conditions that you should watch for after a prostate biopsy:    1. Excessive pain  2. Bleeding irregularities (passing numerous  dime sized  clots or if your urine looks like cranberry juice)  3. Fever of 100 degrees or more  4. If you are unable to urinate        If any of these occur, call the Urology Clinic during normal business hours (M-F, 8:00-4:30) at [phone].  If you experience a problem after normal business hours, call our 24-hour phone number at [phone] and ask for the Urology Resident on call to be paged.      If you experience any discomfort following the biopsy, you may take Tylenol.  DO NOT TAKE ASPIRIN unless specified by your physician.   If the discomfort becomes severe or uncontrolled by medication, contact the Urology Clinic or Urology Resident (after normal business hours).      Do not be alarmed if you have some blood in your stool, in your urine, or ejaculate (semen).  This occurrence is normal and may last up to three (3) or four (4) days, usually intermittently.  Blood in the ejaculate (semen) may last several weeks, up to about a dozen ejaculations.  The blood in your ejaculate may appear as brown streaks, blood tinged, and immediately following a biopsy, it may appear bright red.      If you run a fever above 100 degrees, call the Urology Clinic or Urology Resident (after normal business hours) immediately.  If you are unable to reach your physician or the Resident on call, go to the nearest emergency room.  Explain  that you have had a transrectal biopsy of your prostate and what problems you are experiencing.        You should attempt to urinate following your biopsy before you leave the clinic.  If you are unable to urinate four (4) to six (6) hours after you leave the clinic, you will need to contact the Urology Clinic or the Resident on call.  If you are unable to reach your physician or the Resident on call, go to the nearest emergency room.            If you have any questions or concerns after your biopsy, feel free to contact the Urology Clinic at [phone] during M-F, 8:00-5pm business hours.  If you need to speak with someone after normal business hours, call [phone] and ask for the Resident on call to be paged.          Follow-ups after your visit        Your next 10 appointments already scheduled     Mar 03, 2017  2:30 PM CST   Hearing Aid Fitting with [facility] Audiology ([facility])    [address] [place] [id number]   [phone]            Mar 10, 2017  3:15 PM CST   (Arrive by 3:00 PM)   Return Visit with Dolly Granda MD   [facility] Urology and [facility] for Prostate and Urologic Cancers ([facility])    [address] [place] [id number] [phone]            Mar 28, 2017  2:00 PM CDT   (Arrive by 1:45 PM)   Initial Review Program with [facility]in [facility] Audiology ([facility])    [address] [place] [address]   [phone]            Mar 30, 2017  3:00 PM CDT   Post-Op with Isai Varma MD   Eye Clinic ([facility])    [address]  [address]  [facility] [address]   [phone]            Apr 10, 2017 12:00 PM CDT   (Arrive by 11:45 AM)   PAC PHONE RN ASSESSMENT with Imelda Pac Rn   [facility] Preoperative Assessment Center ([facility])     [address]  [address] [facility] [id number] [phone]           Note: this is not an onsite visit; there is no need to come to the facility.            Apr 12, 2017   Procedure with Felicia Pereira MD   [facility] Surgery and Procedure Center ([facility])    [address] [place] [address]  [address]th [facility] [id number] [phone]           Located in the Clinics and Surgery Center at [address] [place] [address].   parking is very convenient and highly recommended.  is a $6 flat rate fee; no tips accepted.  Both  and self parkers should enter the main arrival plaza from [facility]; parking attendants will direct you based on your parking preference.            Apr 12, 2017 11:40 AM CDT   Test/Procedure with  Generic    Services Department ([facility])    [address] [place] [phone]               May 03, 2017 10:30 AM CDT   (Arrive by 10:15 AM)   Return Visit with Felicia Pereira MD   [facility] Ear Nose and Throat ([facility])    [address] [place] [address]  4th [facility] [id number] [phone]              Who to contact     Please call your clinic at [phone] to:    Ask questions about your health    Make or cancel appointments    Discuss your medicines    Learn about your test results    Speak to your doctor   If you have compliments or concerns about an experience at your clinic, or if you wish to file a complaint, please contact [facility] Patient Relations at [phone] or email us at [email or web address]         Additional Information About Your Visit        OpenHatchhart Information     MedCenterDisplay is an electronic gateway that provides easy, online access to your medical records. With MedCenterDisplay, you can request a clinic appointment, read your test results, renew a prescription or  "communicate with your care team.     To sign up for Alianzahart visit the website at [email or web address]   You will be asked to enter the access code listed below, as well as some personal information. Please follow the directions to create your username and password.     Your access code is: [email or web address]  Expires: 2017  6:31 AM     Your access code will  in 90 days. If you need help or a new code, please contact your [facility] or call [phone] for assistance.        Care EveryWhere ID     This is your Care EveryWhere ID. This could be used by other organizations to access your [place] medical records  [id number]        Your Vitals Were     Pulse Height BMI (Body Mass Index)             51 1.867 m (6' 1.5\") 17.67 kg/m2          Blood Pressure from Last 3 Encounters:   17 165/67   17 133/70   16 148/73    Weight from Last 3 Encounters:   17 61.6 kg (135 lb 12.8 oz)   17 61.7 kg (136 lb)   17 61.1 kg (134 lb 9.6 oz)              Today, you had the following     No orders found for display       Primary Care Provider Office Phone # Fax #    Juani Choudhary SRIKANTH [phone]       [facility]  [facility] [address]        Thank you!     Thank you for choosing [facility] UROLOGY AND [facility] FOR PROSTATE AND UROLOGIC CANCERS  for your care. Our goal is always to provide you with excellent care. Hearing back from our patients is one way we can continue to improve our services. Please take a few minutes to complete the written survey that you may receive in the mail after your visit with us. Thank you!             Your Updated Medication List - Protect others around you: Learn how to safely use, store and throw away your medicines at www.disposemymeds.org.          This list is accurate as of: 17 10:19 AM.  Always use your most recent med list.                   Brand Name Dispense " Instructions for use    ARTIFICIAL TEARS OP      Apply 1 drop to eye as needed       ciprofloxacin 500 MG tablet    CIPRO    6 tablet    Take 1 tablet (500 mg) by mouth 2 times daily       ofloxacin 0.3 % ophthalmic solution    OCUFLOX     Apply 1 drop to eye       oxybutynin 5 MG 24 hr tablet    DITROPAN-XL    30 tablet    Take 1 tablet (5 mg) by mouth At Bedtime       REFRESH P.M. Oint      OU QHS       tamsulosin 0.4 MG capsule    FLOMAX    90 capsule    Take 1 capsule (0.4 mg) by mouth daily

## 2017-02-24 NOTE — PATIENT INSTRUCTIONS
[place] for Prostate and Urologic Cancers  Precautions Following a Prostate Biopsy    There are four conditions that you should watch for after a prostate biopsy:    1. Excessive pain  2. Bleeding irregularities (passing numerous  dime sized  clots or if your urine looks like cranberry juice)  3. Fever of 100 degrees or more  4. If you are unable to urinate        If any of these occur, call the Urology Clinic during normal business hours (M-F, 8:00-4:30) at [phone].  If you experience a problem after normal business hours, call our 24-hour phone number at [phone] and ask for the Urology Resident on call to be paged.      If you experience any discomfort following the biopsy, you may take Tylenol.  DO NOT TAKE ASPIRIN unless specified by your physician.   If the discomfort becomes severe or uncontrolled by medication, contact the Urology Clinic or Urology Resident (after normal business hours).      Do not be alarmed if you have some blood in your stool, in your urine, or ejaculate (semen).  This occurrence is normal and may last up to three (3) or four (4) days, usually intermittently.  Blood in the ejaculate (semen) may last several weeks, up to about a dozen ejaculations.  The blood in your ejaculate may appear as brown streaks, blood tinged, and immediately following a biopsy, it may appear bright red.      If you run a fever above 100 degrees, call the Urology Clinic or Urology Resident (after normal business hours) immediately.  If you are unable to reach your physician or the Resident on call, go to the nearest emergency room.  Explain that you have had a transrectal biopsy of your prostate and what problems you are experiencing.        You should attempt to urinate following your biopsy before you leave the clinic.  If you are unable to urinate four (4) to six (6) hours after you leave the clinic, you will need to contact the Urology Clinic or the Resident on call.  If you are unable to reach  your physician or the Resident on call, go to the nearest emergency room.            If you have any questions or concerns after your biopsy, feel free to contact the Urology Clinic at [phone] during M-F, 8:00-5pm business hours.  If you need to speak with someone after normal business hours, call [phone] and ask for the Resident on call to be paged.

## 2017-02-24 NOTE — PROGRESS NOTES
Shayne Fiore is seen for a right ear check.  He has a perforation and is scheduled for a cartilage backed tympanoplasty in April.  However, he was noted to have a candidal infection and was placed on clotrimazole drops.  He has been using the drops and has not noticed much difference with his ear as he had not been having any otorrhea or otalgia.    Physical examination:  male in no acute distress.  Alert and answering questions appropriately.  HB 1/6 bilaterally.  Right ear examined under the microscope.  Ear canal clear today with no fungal elements noted, stable 50% inferior perforation, no fungal elements noted on the TM or middle ear, middle ear still moist and this was cultured.    Assessment and plan:  Improved appearance of the ear.  We asked him to continue the clotrimazole until our repeat culture results are back and we will call with those results.  He should continue his dry ear precautions.

## 2017-02-24 NOTE — NURSING NOTE
"Chief Complaint   Patient presents with     Prostate Biopsy     Elevated PSA       Blood pressure 165/67, pulse 51, height 1.867 m (6' 1.5\"), weight 61.6 kg (135 lb 12.8 oz). Body mass index is 17.67 kg/(m^2).    Patient Active Problem List   Diagnosis     Primary open angle glaucoma of both eyes, severe stage     Senile nuclear sclerosis, left     Pseudophakia of right eye     Central pterygium, left     Dislocated IOL (intraocular lens), posterior, right     Anemia     Benign prostatic hyperplasia with urinary obstruction     Elevated prostate specific antigen (PSA)     Presence of intraocular lens     Nonspecific reaction to tuberculin test     Underweight       Allergies   Allergen Reactions     Sulfa Drugs Itching       Current Outpatient Prescriptions   Medication Sig Dispense Refill     ofloxacin (OCUFLOX) 0.3 % ophthalmic solution Apply 1 drop to eye       Artificial Tear Ointment (REFRESH P.M.) OINT OU QHS       ciprofloxacin (CIPRO) 500 MG tablet Take 1 tablet (500 mg) by mouth 2 times daily 6 tablet 0     tamsulosin (FLOMAX) 0.4 MG capsule Take 1 capsule (0.4 mg) by mouth daily 90 capsule 3     Hypromellose (ARTIFICIAL TEARS OP) Apply 1 drop to eye as needed       oxybutynin (DITROPAN-XL) 5 MG 24 hr tablet Take 1 tablet (5 mg) by mouth At Bedtime 30 tablet 3       Social History   Substance Use Topics     Smoking status: Never Smoker     Smokeless tobacco: Never Used     Alcohol use Not on file       CAM Henry  2/24/2017  8:45 AM       "

## 2017-02-24 NOTE — NURSING NOTE
Drug Administration Record    Drug Name: Xylocaine  Dose: 10 mL  Route administered: IM  NDC #: 56765-777-42  Amount of waste(mL):10 mL  Reason for waste: Single use vial

## 2017-02-24 NOTE — NURSING NOTE
Verified with patient that he has not taken blood thinners or aspirin products for at least seven days, started Cipro 500 mg BID 2/23/17, but did not take Cipro this am- pt started 2/22/17, and he did a fleets enema this morning. Physician notified.     Cipro 500 mg PO administered prior to procedure.    The following medication was given:     MEDICATION: Gentamicin  ROUTE: IM  SITE: RUQ - Gluteus  DOSE: 80 mg  LOT #: [id number]  :  Wanderio  EXPIRATION DATE:  01/18  NDC#: 08336-320-99     Pt tolerated the injection well.

## 2017-02-25 LAB
BACTERIA SPEC CULT: NO GROWTH
MICRO REPORT STATUS: NORMAL
SPECIMEN SOURCE: NORMAL

## 2017-02-28 ENCOUNTER — PRE VISIT (OUTPATIENT)
Dept: UROLOGY | Facility: CLINIC | Age: 67
End: 2017-02-28

## 2017-02-28 NOTE — TELEPHONE ENCOUNTER
Patient is coming in to see  for biopsy results, very thing is in epic ready for appointment no need for a call.

## 2017-03-01 ENCOUNTER — CARE COORDINATION (OUTPATIENT)
Dept: OTOLARYNGOLOGY | Facility: CLINIC | Age: 67
End: 2017-03-01

## 2017-03-01 NOTE — PATIENT INSTRUCTIONS
Dr. Pereira has reviewed the ear culture done 2-23-17, no growth. Can stop the drops.   Above message given to Son Malka- [phone]- he understood.  Mariaelena Lisa RN  ENT Care Coordinator   Otology  [phone]

## 2017-03-02 ENCOUNTER — TRANSFERRED RECORDS (OUTPATIENT)
Dept: HEALTH INFORMATION MANAGEMENT | Facility: CLINIC | Age: 67
End: 2017-03-02

## 2017-03-02 LAB — COPATH REPORT: NORMAL

## 2017-03-03 ENCOUNTER — TRANSFERRED RECORDS (OUTPATIENT)
Dept: HEALTH INFORMATION MANAGEMENT | Facility: CLINIC | Age: 67
End: 2017-03-03

## 2017-03-03 ENCOUNTER — OFFICE VISIT (OUTPATIENT)
Dept: AUDIOLOGY | Facility: CLINIC | Age: 67
End: 2017-03-03

## 2017-03-03 DIAGNOSIS — H90.5 SENSORINEURAL HEARING LOSS OF LEFT EAR: Primary | ICD-10-CM

## 2017-03-03 NOTE — PROGRESS NOTES
AUDIOLOGY REPORT    SUBJECTIVE: Shayne Fiore is a 67 year old male who was seen in the Audiology Clinic at was seen in Audiology at the [facility] for a fitting of Unitron Stride 700 left in-the-ear (ITE) hearing aid. Previous results have revealed a left mild to profound sensorineural hearing loss, and a right profound rising to severe mixed hearing loss.  The patient was medically evaluated and determined to be cleared for a left hearing aid by Dr.Tina Pereira. Patient's son acts as .    OBJECTIVE: The hearing aid conformity evaluation was completed.The hearing aids were placed and they provided a good fit. Real-ear-probe-microphone measurements were completed on the Drivr system and were a good match to NAL-NL1 target with soft sounds audible, moderate sounds comfortable, and loud sounds below discomfort. UCLs are verified through maximum power output measures and demonstrate appropriate limiting of loud inputs. Shayne was oriented to proper hearing aid use, care, cleaning (no water, dry brush), batteries (size 13, insertion/removal, toxicity, low-battery signal), aid insertion/removal, user booklet, warranty information, storage cases, and other hearing aid details. The patient confirmed understanding of hearing aid use and care, and showed proper insertion of hearing aid and batteries while in the office today.Shayne reported good volume and sound quality today.   Hearing aids were programmed as follows:  Program 1:Auto only, no Volume control    ASSESSMENT: Unitron Stride 7000 ITE hearing aid(s) were fit today. Verification measures were performed. Shayne signed the Hearing Aid Purchase Agreement and was given a copy, as well as details on his hearing aids.  PLAN:Shayne will return for follow-up in 2-3 weeks for a hearing aid review appointment. Please call this clinic with questions regarding today s appointment.      Scarlett MOURA  Fernanda Sanchez, [facility]  Licensed Audiologist  [place] #[id number]

## 2017-03-03 NOTE — MR AVS SNAPSHOT
After Visit Summary   3/3/2017    Shayne Fiore    MRN: [id number]           Patient Information     Date Of Birth          1950        Visit Information        Provider Department      3/3/2017 2:30 PM Balaji Lockhart; Scarlett Sanchez AuD [facility] Audiology        Today's Diagnoses     Sensorineural hearing loss of left ear    -  1       Follow-ups after your visit        Your next 10 appointments already scheduled     Mar 10, 2017  3:15 PM CST   (Arrive by 3:00 PM)   Return Visit with Dolly Granda MD   [facility] Urology and [facility] for Prostate and Urologic Cancers ([facility])    [address] [place] [address]  4th [facility] [address] [phone]            Mar 28, 2017  2:00 PM CDT   (Arrive by 1:45 PM)   Initial Review Program with Pravin Moran   [facility] Audiology ([facility])    [address] [place] [address]  4th [facility] [id number] [phone]            Mar 30, 2017  3:00 PM CDT   Post-Op with Isai Varma MD   Eye Clinic ([facility])    [address]  [address] [facility]  9th Fl Clin 9a  [facility] [address] [phone]            Apr 10, 2017 12:00 PM CDT   (Arrive by 11:45 AM)   PAC PHONE RN ASSESSMENT with  Pac Rn   [facility] Preoperative Assessment Center ([facility])    [address] [place] [address]  4th [facility] [address] [phone]           Note: this is not an onsite visit; there is no need to come to the facility.            Apr 12, 2017   Procedure with Felicia Pereira MD   [facility] Surgery and Procedure Center ([facility])    [address] [place] [address]  5th [facility] [address] [phone]           Located in the Clinics and Surgery Center at [address] [place] [address].   parking is very convenient and highly recommended.  is a $6 flat rate fee.  Both  and self parkers  should enter the main arrival plaza from [facility]; parking attendants will direct you based on your parking preference.            2017 11:40 AM CDT   Test/Procedure with  Generic    Services Department ([facility])    [facility] [address]               May 03, 2017 10:30 AM CDT   (Arrive by 10:15 AM)   Return Visit with Felicia Pereira MD   [facility] Ear Nose and Throat ([facility])    [address] [facility]  4th [facility] [address] [phone]              Who to contact     Please call your clinic at [phone] to:    Ask questions about your health    Make or cancel appointments    Discuss your medicines    Learn about your test results    Speak to your doctor   If you have compliments or concerns about an experience at your clinic, or if you wish to file a complaint, please contact [facility] Patient Relations at [phone] or email us at [email or web address]         Additional Information About Your Visit        Synthego Information     Synthego is an electronic gateway that provides easy, online access to your medical records. With Synthego, you can request a clinic appointment, read your test results, renew a prescription or communicate with your care team.     To sign up for Synthego visit the website at [email or web address]   You will be asked to enter the access code listed below, as well as some personal information. Please follow the directions to create your username and password.     Your access code is: [email or web address]  Expires: 2017  6:30 AM     Your access code will  in 90 days. If you need help or a new code, please contact your [facility] or call [phone] for assistance.        Care EveryWhere ID     This is your Care EveryWhere ID. This could be used by other  organizations to access your [place] medical records  [id number]         Blood Pressure from Last 3 Encounters:   02/24/17 165/67   01/27/17 133/70   12/13/16 148/73    Weight from Last 3 Encounters:   02/24/17 61.6 kg (135 lb 12.8 oz)   02/23/17 61.7 kg (136 lb)   01/27/17 61.1 kg (134 lb 9.6 oz)              We Performed the Following     Hearing Aid Fitting        Primary Care Provider Office Phone # Fax #    Juani ChoudharySRIKANTH [phone]       [facility] [address] [facility] [address]        Thank you!     Thank you for choosing [facility] AUDIOLOGY  for your care. Our goal is always to provide you with excellent care. Hearing back from our patients is one way we can continue to improve our services. Please take a few minutes to complete the written survey that you may receive in the mail after your visit with us. Thank you!             Your Updated Medication List - Protect others around you: Learn how to safely use, store and throw away your medicines at www.disposemymeds.org.          This list is accurate as of: 3/3/17  5:59 PM.  Always use your most recent med list.                   Brand Name Dispense Instructions for use    ARTIFICIAL TEARS OP      Apply 1 drop to eye as needed       * ciprofloxacin 500 MG tablet    CIPRO    6 tablet    Take 1 tablet (500 mg) by mouth 2 times daily       * ciprofloxacin 500 MG tablet    CIPRO    1 tablet    Take 1 tablet (500 mg) by mouth 2 times daily       ofloxacin 0.3 % ophthalmic solution    OCUFLOX     Apply 1 drop to eye       oxybutynin 5 MG 24 hr tablet    DITROPAN-XL    30 tablet    Take 1 tablet (5 mg) by mouth At Bedtime       REFRESH P.M. Oint      OU QHS       tamsulosin 0.4 MG capsule    FLOMAX    90 capsule    Take 1 capsule (0.4 mg) by mouth daily       * Notice:  This list has 2 medication(s) that are the same as other medications prescribed for you. Read the directions carefully, and ask your doctor or  other care provider to review them with you.

## 2017-03-10 ENCOUNTER — OFFICE VISIT (OUTPATIENT)
Dept: UROLOGY | Facility: CLINIC | Age: 67
End: 2017-03-10

## 2017-03-10 VITALS
DIASTOLIC BLOOD PRESSURE: 65 MMHG | SYSTOLIC BLOOD PRESSURE: 154 MMHG | BODY MASS INDEX: 17.97 KG/M2 | HEART RATE: 50 BPM | HEIGHT: 74 IN | WEIGHT: 140 LBS

## 2017-03-10 DIAGNOSIS — C61 MALIGNANT NEOPLASM OF PROSTATE (H): Primary | ICD-10-CM

## 2017-03-10 ASSESSMENT — PAIN SCALES - GENERAL: PAINLEVEL: NO PAIN (0)

## 2017-03-10 NOTE — MR AVS SNAPSHOT
After Visit Summary   3/10/2017    Shayne Fiore    MRN: [id number]           Patient Information     Date Of Birth          1950        Visit Information        Provider Department      3/10/2017 3:00 PM Dolly Granda MD; [facility] Urology and [facility] for Prostate and Urologic Cancers        Care Instructions    Please make a appointment with   Please follow up with  after seeing         Follow-ups after your visit        Your next 10 appointments already scheduled     Mar 28, 2017  2:00 PM CDT   (Arrive by 1:45 PM)   Initial Review Program with Pravin Moran   [facility] Audiology ([facility])    [address] [facility]  4th Floor  [facility] [address] [phone]            Mar 29, 2017  9:00 AM CDT   (Arrive by 8:45 AM)   CONSULT with Kwame Clements MD   Radiation Oncology Clinic ([facility])    [facility]  1st Floor  [address] [facility] [address] [phone]            Mar 30, 2017  3:00 PM CDT   Post-Op with Isai Varma MD   Eye Clinic ([facility])    [address]  [address] [facility]  9th Fl Clin 9a  [facility] [address] [phone]            Apr 07, 2017 10:00 AM CDT   (Arrive by 9:45 AM)   Return Visit with Dolly Granda MD   [facility]rology and [facility] for Prostate and Urologic Cancers ([facility] [place])    [address] [facility]  4th [facility] [address] [phone]            Apr 10, 2017 12:00 PM CDT   (Arrive by 11:45 AM)   PAC PHONE RN ASSESSMENT with Uc Pac Rn   [facility] Preoperative Assessment Center ([facility])    [address] [facility]  4th [facility] [address] [phone]           Note: this is not an onsite visit; there is no need to come to the facility.            Apr 12, 2017   Procedure with Felicia Pereira MD    [facility] Surgery and Procedure Center ([facility])    [address] [place] [address]  5th [facility] [address] [phone]           Located in the Clinics and Surgery Center at [address] [place] [address].   parking is very convenient and highly recommended.  is a $6 flat rate fee.  Both  and self parkers should enter the main arrival plaza from [facility]; parking attendants will direct you based on your parking preference.            Apr 12, 2017 11:40 AM CDT   Test/Procedure with  Generic    Services Department ([facility])    [address] [place] [address] [phone]            May 03, 2017 10:30 AM CDT   (Arrive by 10:15 AM)   Return Visit with Felicia Pereira MD   [facility] Ear Nose and Throat ([facility])    [address] [place] [address] [phone]              Who to contact     Please call your clinic at [phone] to:    Ask questions about your health    Make or cancel appointments    Discuss your medicines    Learn about your test results    Speak to your doctor   If you have compliments or concerns about an experience at your clinic, or if you wish to file a complaint, please contact [facility] Phys[facility] Patient Relations at [phone] or email us at [email or web address]         Additional Information About Your Visit        [employer] Information     Aventeon is an electronic gateway that provides easy, online access to your medical records. With Aventeon, you can request a clinic appointment, read your test results, renew a prescription or communicate with your care team.     To sign up for Aventeon visit the website at [email or web address]   You will be asked to enter the access code listed below, as well as some personal information. Please follow the directions  "to create your username and password.     Your access code is: [email or web address]  Expires: 2017  6:30 AM     Your access code will  in 90 days. If you need help or a new code, please contact your [facility] Clinic or call [phone] for assistance.        Care EveryWhere ID     This is your Care EveryWhere ID. This could be used by other organizations to access your [place] medical records  [id number]        Your Vitals Were     Pulse Height BMI (Body Mass Index)             50 1.88 m (6' 2\") 17.97 kg/m2          Blood Pressure from Last 3 Encounters:   03/10/17 154/65   17 165/67   17 133/70    Weight from Last 3 Encounters:   03/10/17 63.5 kg (140 lb)   17 61.6 kg (135 lb 12.8 oz)   17 61.7 kg (136 lb)              Today, you had the following     No orders found for display       Primary Care Provider Office Phone # Fax #    Juani GroveSRIKANTH zee [phone]       [facility]  [facility] [address]        Thank you!     Thank you for choosing [facility] UROLOGY AND [place] FOR PROSTATE AND UROLOGIC CANCERS  for your care. Our goal is always to provide you with excellent care. Hearing back from our patients is one way we can continue to improve our services. Please take a few minutes to complete the written survey that you may receive in the mail after your visit with us. Thank you!             Your Updated Medication List - Protect others around you: Learn how to safely use, store and throw away your medicines at www.disposemymeds.org.          This list is accurate as of: 3/10/17  4:36 PM.  Always use your most recent med list.                   Brand Name Dispense Instructions for use    ARTIFICIAL TEARS OP      Apply 1 drop to eye as needed       * ciprofloxacin 500 MG tablet    CIPRO    6 tablet    Take 1 tablet (500 mg) by mouth 2 times daily       * ciprofloxacin 500 MG tablet    CIPRO    1 tablet    Take 1 " tablet (500 mg) by mouth 2 times daily       ofloxacin 0.3 % ophthalmic solution    OCUFLOX     Apply 1 drop to eye       oxybutynin 5 MG 24 hr tablet    DITROPAN-XL    30 tablet    Take 1 tablet (5 mg) by mouth At Bedtime       REFRESH P.M. Oint      OU QHS       tamsulosin 0.4 MG capsule    FLOMAX    90 capsule    Take 1 capsule (0.4 mg) by mouth daily       * Notice:  This list has 2 medication(s) that are the same as other medications prescribed for you. Read the directions carefully, and ask your doctor or other care provider to review them with you.

## 2017-03-10 NOTE — NURSING NOTE
Chief Complaint   Patient presents with     RECHECK     biopsy results       Elizabeth Mendiola MA

## 2017-03-11 NOTE — PROGRESS NOTES
"Office Visit Note      UROLOGIC DIAGNOSES:   Elevated PSA, lower urinary tract symptoms      CURRENT INTERVENTIONS:       HISTORY:     Mr. 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      Patient had MRI prostate that showed several areas of suspicion.   S/p MRI guided prostate biopsy with three areas of henrique 3+3 prostate cancer and one core of henrique 4+3.     We discussed pathology results as well as the option of EBRT vs RALP.       PAST MEDICAL HISTORY: No past medical history on file.    PAST SURGICAL HISTORY:   Past Surgical History   Procedure Laterality Date     Eye surgery Right      x 2, unsure what type of surgery     Scrotal hematoma Right      Cataract iol, rt/lt Right 02/15/2017     IOL explantation/ant vit/ACIOL       FAMILY HISTORY:   Family History   Problem Relation Age of Onset     Glaucoma Sister      Glaucoma Cousin      Macular Degeneration No family hx of        SOCIAL HISTORY:   Social History   Substance Use Topics     Smoking status: Never Smoker     Smokeless tobacco: Never Used     Alcohol use Not on file       Current Outpatient Prescriptions   Medication     ciprofloxacin (CIPRO) 500 MG tablet     ofloxacin (OCUFLOX) 0.3 % ophthalmic solution     Artificial Tear Ointment (REFRESH P.M.) OINT     ciprofloxacin (CIPRO) 500 MG tablet     tamsulosin (FLOMAX) 0.4 MG capsule     Hypromellose (ARTIFICIAL TEARS OP)     oxybutynin (DITROPAN-XL) 5 MG 24 hr tablet     No current facility-administered medications for this visit.          PHYSICAL EXAM:    /65  Pulse 50  Ht 1.88 m (6' 2\")  Wt 63.5 kg (140 lb)  BMI 17.97 kg/m2    HEENT: Normocephalic and atraumatic   Cardiac: Not done  Back/Flank: Not done  CNS/PNS: Not done  Respiratory: Normal non-labored breathing  Abdomen: Soft nontender and nondistended  Peripheral Vascular: Not done  Mental Status: Not done    Penis: Not done  Scrotal Skin: Not done  Testicles: Not done  Epididymis: Not " done  Digital Rectal Exam:     Cystoscopy: Not done    Imaging: None    Urinalysis: UA RESULTS:  Recent Labs   Lab Test  12/13/16   1700   COLOR  Yellow   APPEARANCE  Slightly Cloudy   URINEGLC  Negative   URINEBILI  Negative   URINEKETONE  Negative   SG  1.018   UBLD  Small*   URINEPH  5.0   PROTEIN  Negative   NITRITE  Negative   LEUKEST  Trace*   RBCU  <1   WBCU  2       PSA: 18.87    Post Void Residual:     Other labs: None today      IMPRESSION:  68 y/o M with prostate cancer    PLAN:  Refer to Dr. Clements to discuss option of EBRT   Follow up after to review EBRT vs RALP for prostate cancer     Total Time: 15 minutes                                       Total in Consultation: greater than 50%

## 2017-03-23 LAB
FUNGUS SPEC CULT: NORMAL
MICRO REPORT STATUS: NORMAL
SPECIMEN SOURCE: NORMAL

## 2017-03-27 ENCOUNTER — PRE VISIT (OUTPATIENT)
Dept: UROLOGY | Facility: CLINIC | Age: 67
End: 2017-03-27

## 2017-03-27 NOTE — TELEPHONE ENCOUNTER
Patient is coming in to see  for a follow up after seeing , no need for a call very thing is in epic ready for appointment.

## 2017-03-28 ENCOUNTER — OFFICE VISIT (OUTPATIENT)
Dept: AUDIOLOGY | Facility: CLINIC | Age: 67
End: 2017-03-28

## 2017-03-28 DIAGNOSIS — H90.3 ASYMMETRICAL SENSORINEURAL HEARING LOSS: Primary | ICD-10-CM

## 2017-03-28 NOTE — MR AVS SNAPSHOT
After Visit Summary   3/28/2017    Shayne Foire    MRN: [id number]           Patient Information     Date Of Birth          1950        Visit Information        Provider Department      3/28/2017 1:45 PM Mere Flores; Scarlett Sanchez AuD [facility] Audiology         Follow-ups after your visit        Your next 10 appointments already scheduled     Mar 29, 2017  8:45 AM CDT   (Arrive by 8:30 AM)   CONSULT with Kwame Clements MD, [employer]   Radiation Oncology Clinic ([facility])    [facility]  1st Floor  [address] [facility] [address]   [phone]            Mar 30, 2017  2:45 PM CDT   Post-Op with Isai Varma MD, [employer]   Eye Clinic ([facility])    [address]  [address] [facility]  [address] Clin 9a  [facility] [address]   [phone]            Apr 07, 2017 10:00 AM CDT   (Arrive by 9:45 AM)   Return Visit with Dolly Granda MD   [facility] Urology and Inst for Prostate and Urologic Cancers ([facility] [place])    [address] [place] [address]  4th [facility] [address] [phone]            Apr 10, 2017 12:00 PM CDT   (Arrive by 11:45 AM)   PAC PHONE RN ASSESSMENT with Uc Pac Rn   [facility] Preoperative Assessment Center ([facility] [place])    [address] [place] [address]  4th [facility] [address] [phone]           Note: this is not an onsite visit; there is no need to come to the facility.            Apr 12, 2017   Procedure with Felicia Periera MD   [facility] Surgery and Procedure Center ([facility] [place])    [address] [place] [address]  5th [facility] [address] [phone]           Located in the [facility] Center at [address] [place] [address].   parking is very convenient and highly recommended.  is a $6 flat rate fee.  Both  and self parkers should enter  the main arrival plaza from [facility]; parking attendants will direct you based on your parking preference.            2017 11:40 AM CDT   Test/Procedure with  Generic    Services Department ([facility])    [address] [place] [address] [phone]            May 03, 2017 10:30 AM CDT   (Arrive by 10:15 AM)   Return Visit with Felicia Pereira MD   [facility] Ear Nose and Throat ([facility])    [address] [place] [address] [phone]            Aug 01, 2017  1:00 PM CDT   Programmable Hearing Aid Check with Pravin Moran [facility] Audiology ([facility])    [address] [place] [address] [phone]              Who to contact     Please call your clinic at [phone] to:    Ask questions about your health    Make or cancel appointments    Discuss your medicines    Learn about your test results    Speak to your doctor   If you have compliments or concerns about an experience at your clinic, or if you wish to file a complaint, please contact [facility] Physicians Patient Relations at [phone] or email us at [email or web address]         Additional Information About Your Visit        [employer] Information     AFreezet is an electronic gateway that provides easy, online access to your medical records. With [employer], you can request a clinic appointment, read your test results, renew a prescription or communicate with your care team.     To sign up for AFreezet visit the website at [email or web address]   You will be asked to enter the access code listed below, as well as some personal information. Please follow the directions to create your username and password.     Your access code is: [email or web address]  Expires: 2017  7:30 AM     Your access code will  in 90 days.  If you need help or a new code, please contact your [facility] Physicians Clinic or call [phone] for assistance.        Care EveryWhere ID     This is your Care EveryWhere ID. This could be used by other organizations to access your [place] medical records  [id number]         Blood Pressure from Last 3 Encounters:   03/10/17 154/65   02/24/17 165/67   01/27/17 133/70    Weight from Last 3 Encounters:   03/10/17 63.5 kg (140 lb)   02/24/17 61.6 kg (135 lb 12.8 oz)   02/23/17 61.7 kg (136 lb)              Today, you had the following     No orders found for display       Primary Care Provider Office Phone # Fax #    Juani SRIKANTH Choudhary [phone]       [facility] [address] [facility] [address]        Thank you!     Thank you for choosing [facility] AUDIOLOGY  for your care. Our goal is always to provide you with excellent care. Hearing back from our patients is one way we can continue to improve our services. Please take a few minutes to complete the written survey that you may receive in the mail after your visit with us. Thank you!             Your Updated Medication List - Protect others around you: Learn how to safely use, store and throw away your medicines at www.disposemymeds.org.          This list is accurate as of: 3/28/17  2:51 PM.  Always use your most recent med list.                   Brand Name Dispense Instructions for use    ARTIFICIAL TEARS OP      Apply 1 drop to eye as needed       * ciprofloxacin 500 MG tablet    CIPRO    6 tablet    Take 1 tablet (500 mg) by mouth 2 times daily       * ciprofloxacin 500 MG tablet    CIPRO    1 tablet    Take 1 tablet (500 mg) by mouth 2 times daily       ofloxacin 0.3 % ophthalmic solution    OCUFLOX     Apply 1 drop to eye       oxybutynin 5 MG 24 hr tablet    DITROPAN-XL    30 tablet    Take 1 tablet (5 mg) by mouth At Bedtime       REFRESH P.M. Oint      OU QHS       tamsulosin 0.4 MG capsule     FLOMAX    90 capsule    Take 1 capsule (0.4 mg) by mouth daily       * Notice:  This list has 2 medication(s) that are the same as other medications prescribed for you. Read the directions carefully, and ask your doctor or other care provider to review them with you.

## 2017-03-28 NOTE — PROGRESS NOTES
AUDIOLOGY REPORT    BACKGROUND INFORMATION: Shayne Fiore was seen in was seen in Audiology at the [facility] [place] on 3/28/2017 for follow-up.  The patient has been seen previously in this clinic and was fit with a left Unitron Stride 700 in-the-ear (ITE) hearing aid on 03/03/2017.  Patient has a  left mild to profound sensorineural hearing loss, and a right profound rising to severe mixed hearing loss.. The patient reports background noise too loud, and has several questions regarding the batteries. He is here with an  and his son.    TEST RESULTS AND PROCEDURES: A first follow-up was performed.  Patient reports hearing aid(s) is working well and he has been wearing it consistently.   Adjustments were made including reducing low frequency gain and increasing wind block. and the patient reported he likes the settings. Reviewed care, cleaning (no water, dry brush), batteries (size 13, insertion/removal, toxicity, low-battery signal), aid insertion/removal, volume wheel adjustment, user booklet, warranty information, storage cases, and other hearing aid details. 4 packs of size 13 batteries are dispensed.    SUMMARY AND RECOMMENDATIONS: A first follow-up was performed today and the patient reports he is happy with the aids.  Adjustments were made as noted above and the patient will return as needed or at least every 4-6 months for cleaning and assessment of hearing aid.  Patient wanted to keep hearing aid and is happy with it today. Call this clinic with questions regarding today s visit.          Fernanda Anderson, [facility]  Licensed Audiologist  [place] #[id number]

## 2017-03-29 ENCOUNTER — OFFICE VISIT (OUTPATIENT)
Dept: RADIATION ONCOLOGY | Facility: CLINIC | Age: 67
End: 2017-03-29
Attending: RADIOLOGY
Payer: COMMERCIAL

## 2017-03-29 VITALS
DIASTOLIC BLOOD PRESSURE: 72 MMHG | WEIGHT: 137.2 LBS | SYSTOLIC BLOOD PRESSURE: 156 MMHG | HEART RATE: 54 BPM | BODY MASS INDEX: 17.62 KG/M2

## 2017-03-29 DIAGNOSIS — C61 PROSTATE CANCER (H): Primary | ICD-10-CM

## 2017-03-29 PROCEDURE — 99214 OFFICE O/P EST MOD 30 MIN: CPT | Performed by: RADIOLOGY

## 2017-03-29 ASSESSMENT — ENCOUNTER SYMPTOMS
WHEEZING: 0
CHILLS: 0
BLURRED VISION: 0
NECK PAIN: 0
COUGH: 0
HEMATURIA: 0
BACK PAIN: 0
HEARTBURN: 0
FREQUENCY: 1
FEVER: 0
DIARRHEA: 0
SHORTNESS OF BREATH: 0
DIZZINESS: 0
DYSURIA: 0
VOMITING: 0
WEIGHT LOSS: 0
HEADACHES: 0
NAUSEA: 0
CONSTIPATION: 0
NERVOUS/ANXIOUS: 0
DEPRESSION: 0
SORE THROAT: 0

## 2017-03-29 NOTE — PROGRESS NOTES
HPI  INITIAL PATIENT ASSESSMENT    Diagnosis: prostate cancer    Prior radiation therapy: None    Prior chemotherapy: None    Prior hormonal therapy:No    Pain Eval:  Denies    Psychosocial  Living arrangements: family  Fall Risk: ambulates with assistive device   referral needs:     Advanced Directive: No  Implantable Cardiac Device? Yes    Onset of menarche:   LMP: No LMP for male patient.  Onset of menopause:   Abnormal vaginal bleeding/discharge:   Are you pregnant?   Reproductive note: children      Review of Systems   Constitutional: Negative for chills, fever, malaise/fatigue and weight loss.   HENT: Positive for congestion. Negative for sore throat.    Eyes: Negative for blurred vision.   Respiratory: Negative for cough, shortness of breath and wheezing.    Cardiovascular: Negative for chest pain and leg swelling.   Gastrointestinal: Negative for constipation, diarrhea, heartburn, nausea and vomiting.   Genitourinary: Positive for frequency. Negative for dysuria, hematuria and urgency.   Musculoskeletal: Negative for back pain and neck pain.   Skin: Negative for itching and rash.   Neurological: Negative for dizziness and headaches.   Endo/Heme/Allergies: Positive for environmental allergies.   Psychiatric/Behavioral: Negative for depression. The patient is not nervous/anxious.

## 2017-03-29 NOTE — MR AVS SNAPSHOT
After Visit Summary   3/29/2017    Shayne Fiore    MRN: [id number]           Patient Information     Date Of Birth          1950        Visit Information        Provider Department      3/29/2017 8:45 AM Kwame Clements MD; [facility]        Today's Diagnoses     Prostate cancer (H)    -  1       Follow-ups after your visit        Your next 10 appointments already scheduled     Apr 07, 2017 10:00 AM CDT   (Arrive by 9:45 AM)   Return Visit with Dolly Granda MD   [facility] Urology and Inst for Prostate and Urologic Cancers ([facility])    [address] [place] [address]  4th Floor  [facility] [id number] [phone]            Apr 10, 2017 12:00 PM CDT   (Arrive by 11:45 AM)   PAC PHONE RN ASSESSMENT with Uc Pac Rn   [facility] Preoperative Assessment Center ([facility])    [address] [place] [address]  4th Floor  [facility] [id number] [phone]           Note: this is not an onsite visit; there is no need to come to the facility.            Apr 12, 2017   Procedure with Felicia Pereira MD   [facility] Surgery and Procedure Center ([facility])    [address] [place] [address]  5th Floor  [facility] [address] [phone]           Located in the Clinics and Surgery Center at [address] [place] [address].   parking is very convenient and highly recommended.  is a $6 flat rate fee.  Both  and self parkers should enter the main arrival plaza from [facility]; parking attendants will direct you based on your parking preference.            Apr 12, 2017 11:40 AM CDT   Test/Procedure with  Generic    Services Department ([facility], [facility])    [address] [place] [address] [phone]            May 03, 2017 10:30 AM CDT   (Arrive by 10:15 AM)   Return Visit with Felicia DIXON  MD Randall   [facility] Ear Nose and Throat ([facility] [place])    [address] [facility] [address] [facility]  4th Floor  [facility] [address] [phone]            May 08, 2017  1:00 PM CDT   Evaluation 90 with Elly Meadows OT   [facility], [place], Occupational Therapy, Vision - Outpatie ([facility])    [address]  9th Floor, Clinic 9a  [facility] [address] [phone]            May 30, 2017  9:45 AM CDT   RETURN CORNEA with Isai Varma MD   Eye Clinic ([facility])    [address]  [address]  9th Fl Clin 9a  [facility] [address] [phone]            Aug 01, 2017  1:00 PM CDT   Programmable Hearing Aid Check with Pravin Moran [facility] Aud[facility] ([facility])    [address]  4th Floor  [facility] [address] [phone]              Who to contact     Please call your clinic at [phone] to:    Ask questions about your health    Make or cancel appointments    Discuss your medicines    Learn about your test results    Speak to your doctor   If you have compliments or concerns about an experience at your clinic, or if you wish to file a complaint, please contact [facility] Patient Relations at [phone] or email us at [email or web address]         Additional Information About Your Visit        Rentlyticshart Information     SalesFloor.itt is an electronic gateway that provides easy, online access to your medical records. With Peach [employer] Lily, you can request a clinic appointment, read your test results, renew a prescription or communicate with your care team.     To sign up for Peach [employer] Lily visit the website at [email or web address]   You will be asked to enter the access code listed below, as well as some personal information. Please follow the directions to create your username and password.     Your access code is:  [email or web address]  Expires: 2017  7:30 AM     Your access code will  in 90 days. If you need help or a new code, please contact your [facility] or call [phone] for assistance.        Care EveryWhere ID     This is your Care EveryWhere ID. This could be used by other organizations to access your [place] medical records  [id number]        Your Vitals Were     Pulse BMI (Body Mass Index)                54 17.62 kg/m2           Blood Pressure from Last 3 Encounters:   17 156/72   03/10/17 154/65   17 165/67    Weight from Last 3 Encounters:   17 62.2 kg (137 lb 3.2 oz)   03/10/17 63.5 kg (140 lb)   17 61.6 kg (135 lb 12.8 oz)              Today, you had the following     No orders found for display       Primary Care Provider Office Phone # Fax #    Juani SRIKANTH Choudhary [phone]       [facility]  [facility] [address]        Thank you!     Thank you for choosing [facility]  for your care. Our goal is always to provide you with excellent care. Hearing back from our patients is one way we can continue to improve our services. Please take a few minutes to complete the written survey that you may receive in the mail after your visit with us. Thank you!             Your Updated Medication List - Protect others around you: Learn how to safely use, store and throw away your medicines at www.disposemymeds.org.          This list is accurate as of: 3/29/17 11:59 PM.  Always use your most recent med list.                   Brand Name Dispense Instructions for use    ARTIFICIAL TEARS OP      Apply 1 drop to eye as needed       * ciprofloxacin 500 MG tablet    CIPRO    6 tablet    Take 1 tablet (500 mg) by mouth 2 times daily       * ciprofloxacin 500 MG tablet    CIPRO    1 tablet    Take 1 tablet (500 mg) by mouth 2 times daily       oxybutynin 5 MG 24 hr tablet    DITROPAN-XL    30 tablet     Take 1 tablet (5 mg) by mouth At Bedtime       tamsulosin 0.4 MG capsule    FLOMAX    90 capsule    Take 1 capsule (0.4 mg) by mouth daily       * Notice:  This list has 2 medication(s) that are the same as other medications prescribed for you. Read the directions carefully, and ask your doctor or other care provider to review them with you.

## 2017-03-29 NOTE — LETTER
3/29/2017       RE: Shayne Fiore  [address] [place] [address] [address] [place] [address]     Dear Colleague,    Thank you for referring your patient, Shayne Fiore, to the [facility]. Please see a copy of my visit note below.    [id number]       HPI  INITIAL PATIENT ASSESSMENT    Diagnosis: prostate cancer    Prior radiation therapy: None    Prior chemotherapy: None    Prior hormonal therapy:No    Pain Eval:  Denies    Psychosocial  Living arrangements: family  Fall Risk: ambulates with assistive device   referral needs:     Advanced Directive: No  Implantable Cardiac Device? Yes    Onset of menarche:   LMP: No LMP for male patient.  Onset of menopause:   Abnormal vaginal bleeding/discharge:   Are you pregnant?   Reproductive note: children      Review of Systems   Constitutional: Negative for chills, fever, malaise/fatigue and weight loss.   HENT: Positive for congestion. Negative for sore throat.    Eyes: Negative for blurred vision.   Respiratory: Negative for cough, shortness of breath and wheezing.    Cardiovascular: Negative for chest pain and leg swelling.   Gastrointestinal: Negative for constipation, diarrhea, heartburn, nausea and vomiting.   Genitourinary: Positive for frequency. Negative for dysuria, hematuria and urgency.   Musculoskeletal: Negative for back pain and neck pain.   Skin: Negative for itching and rash.   Neurological: Negative for dizziness and headaches.   Endo/Heme/Allergies: Positive for environmental allergies.   Psychiatric/Behavioral: Negative for depression. The patient is not nervous/anxious.                  REASON FOR CONSULTATION:  Shayne Fiore is being seen today for initial consultation at the request of Dr. Dolly Granda for consideration of radiation therapy for unfavorable intermediate-risk prostate cancer, [place] 4 + 3 = 7, clinical [id number] in 5 total cores (1/12 random cores, 4/8 targeted cores), PSA 18.9, with a PI-RADS 5 lesion  by MRI without suspicion for extracapsular extension or seminal vesicle invasion.  All pertinent labs, imaging and pathology findings have been reviewed.      HISTORY OF PRESENT ILLNESS:  Mr. Fiore is a 67-year-old gentleman from [facility] who initially was told he had an elevated PSA at a medical clinic in [facility], but underwent no further followup at that time.  He [place] the [place] last year and established medical care here.  He was found to have an elevated PSA at 18.9, for which he was referred to Dr. Granda.  They met on 01/27/2017, and he noted a history of lower urinary tract symptoms, which had previously been managed with tamsulosin.  Oxybutynin had also been tried, but was not helpful.  He also noted a history of urinary tract infection symptoms, including fevers and dysuria.  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      He underwent an MRI of the prostate on 01/27/2017, which was significant for a PI-RADS 5 lesion with several suspicious lesions throughout the transitional zone as well as in the peripheral zone, predominantly near the apex.  No extraprostatic extension was identified nor was seminal vesicle invasion.  No suspicious lymph nodes were noted.  Dr. Granda recommended a prostate biopsy, which was obtained on 02/24/2017.  Pathology ([id number]) was significant for prostate adenocarcinoma, [place] 4 + 3 in the left lateral apex involving 15% of the core and present in 1/12 of the random cores.  A total of 8 lesion-directed cores were obtained, and 4/8 cores were positive, all with [place] 3 + 3 = 6 disease involving up to 45% of the sampled cores.      He met again with Dr. Granda on 03/10/2017, who discussed treatment options, including a radical prostatectomy with potential robotic assistance and external beam radiation therapy.  Dr. Granda referred him to our clinic for further discussion.  Otherwise, on our visit today, Mr. Fiore  reports no new symptoms.  His AUA symptom score today is 17/35 with the most concerning symptoms being urinary frequency at 4/5, a weak urinary stream at 4/5 and nocturia at 4/5.  He denies any bony pains or weight loss and denies hematuria, although due to his blindness, he admits that this could go undetected.      CHEMOTHERAPY HISTORY:  None.      PAST RADIATION THERAPY HISTORY:  None.      PAST MEDICAL HISTORY:  Significant for prostate cancer as per HPI and glaucoma with resultant blindness.      PAST SURGICAL HISTORY:  History of eye surgeries x2, but he is uncertain what type of surgery.  He also has a history of scrotal hematoma evacuation by report.      ALLERGIES:  Sulfa drugs.      MEDICATIONS:   1.  Prednisolone ophthalmic suspension.   2.  Tamsulosin 0.4 mg p.o. daily.   3.  Artificial tears.      FAMILY HISTORY:  History of glaucoma in his sister and his cousin.  No pertinent oncologic family history.      SOCIAL HISTORY:  He is .  He is a lifelong never smoker and does not drink alcohol.      REVIEW OF SYSTEMS:  A full 10 point review of systems was obtained and is negative other than as stated in HPI.      PHYSICAL EXAMINATION:   VITAL SIGNS:  Blood pressure 156/72, pulse 54, weight 137 pounds.   GENERAL:  He appears well, alert, oriented, in no acute distress.   CARDIOVASCULAR:  Normal rate, regular rhythm.  Good distal perfusion.   RESPIRATORY:  Clear to auscultation bilaterally.  No wheezes, rales or rhonchi.  Breathing comfortably on room air.   GENITOURINARY:  Normal rectal tone.  Smooth prostate without palpable nodularity.  No blood on exam glove.   PSYCHIATRIC:  Appropriate mood and affect.      ECOG PERFORMANCE STATUS:  1      IMPLANTED CARDIAC DEVICE:  None.      IMPRESSION:  Mr. Fiore is a 67-year-old gentleman with unfavorable intermediate-risk prostate cancer, [place] 4 + 3 = 7, clinical [id number] in a total of 5/20 cores (1/12 random cores, 4/8 targeted cores), PSA 18.9, with no  evidence of extracapsular extension or seminal vesicle invasion.      RECOMMENDATION:  Because of his unfavorable intermediate-risk disease status, we recommend treatment rather than continued observation or active surveillance.  We explained that treatment options include a radical prostatectomy as well as external beam radiation therapy with concurrent short-term (4-6 months) androgen deprivation therapy.  We explained that surgery does appear to be a good option given his overall good medical health and apparent localized disease by MRI.  We also discussed radiation therapy with concurrent short-term androgen deprivation therapy as a reasonable option.      We reviewed the rationale, logistics, risks, benefits and potential side effects of radiation therapy and short-term deprivation therapy.  We encouraged the patient as well as his son to ask questions, which were answered to the best of our ability.  They stated that they would like to discuss this with his family before making a final decision.  We provided them with our contact information and encouraged them to call our clinic or the Urology Clinic with any further questions about radiation therapy or surgery.      The patient was seen and discussed with staff, Dr. Clements. Thank you for involving us in the care of this patient.  Please feel free to contact us with questions or concerns at any time.      Tj Morales MD PGY-3  Radiation Oncology Resident, [facility]  Phone: [phone]    I saw the patient with the resident.  I agree with the resident's note and plan of care.      HARSH Clements M.D.  Department of Radiation Oncology  [facility]    CC  Patient Care Team:  Juani Choudhary NP as PCP - General  Petra Barrera PA as Physician Assistant (Physician Assistant)  Ciara Fallon MD as MD (Ophthalmology)  Juani Choudhary NP as Referring Physician  Mariaelena Lisa RN as Clinic Care Coordinator  (Nurse)  PHYLLIS GUILLEN

## 2017-03-30 ENCOUNTER — OFFICE VISIT (OUTPATIENT)
Dept: OPHTHALMOLOGY | Facility: CLINIC | Age: 67
End: 2017-03-30
Attending: OPHTHALMOLOGY
Payer: COMMERCIAL

## 2017-03-30 DIAGNOSIS — H54.40 BLIND ONE EYE: ICD-10-CM

## 2017-03-30 DIAGNOSIS — H47.231 GLAUCOMATOUS ATROPHY (CUPPING) OF OPTIC DISC, RIGHT: Primary | ICD-10-CM

## 2017-03-30 PROCEDURE — 99212 OFFICE O/P EST SF 10 MIN: CPT | Mod: ZF

## 2017-03-30 RX ORDER — PREDNISOLONE ACETATE 10 MG/ML
1 SUSPENSION/ DROPS OPHTHALMIC DAILY
COMMUNITY
End: 2017-04-20

## 2017-03-30 ASSESSMENT — VISUAL ACUITY
OD_SC: 20/200
METHOD: SNELLEN - LINEAR
OD_PH_SC: 20/125-1
OS_SC: NLP

## 2017-03-30 ASSESSMENT — CONF VISUAL FIELD
OS_INFERIOR_TEMPORAL_RESTRICTION: 1
OS_INFERIOR_NASAL_RESTRICTION: 1
OD_SUPERIOR_NASAL_RESTRICTION: 3
OD_INFERIOR_TEMPORAL_RESTRICTION: 3
OD_INFERIOR_NASAL_RESTRICTION: 3
OD_SUPERIOR_TEMPORAL_RESTRICTION: 3
OS_SUPERIOR_NASAL_RESTRICTION: 1
OS_SUPERIOR_TEMPORAL_RESTRICTION: 1

## 2017-03-30 ASSESSMENT — TONOMETRY
IOP_METHOD: ICARE
OD_IOP_MMHG: 8
OS_IOP_MMHG: 51

## 2017-03-30 ASSESSMENT — SLIT LAMP EXAM - LIDS
COMMENTS: NORMAL
COMMENTS: NORMAL

## 2017-03-30 NOTE — PROGRESS NOTES
CC: Post op right eye    No eye pain. Vision seems about the same so far.    A/P:    1. POW #6 IOL explantation/ant vit/ACIOL right eye (2/15/17).    Vision limited by advanced glaucoma.  Irregular epi SN with staining, no jame epi defect  No improvement in vision with MRx, pinholes to 20/125  Optimize surface, frequent PFATs    Prednisolone once a day, stop in one week  Lubricate    Low vision eval - tint, refraction, etc    Return to clinic 2 months here, remove rhetts    Jose Gallo MD  PGY3, Dept of Ophthalmology,    ~~~~~~~~~~~~~~~~~~~~~~~~~~~~~~~~~~~~~~~~~~~~~~~~~~~~~~~~~~~~~~~~    Complete documentation of historical and exam elements from today's encounter can be found in the full encounter summary report (not reduplicated in this progress note). I personally obtained the chief complaint(s) and history of present illness.  I confirmed and edited as necessary the review of systems, past medical/surgical history, family history, social history, and examination findings as documented by others.  I examined the patient myself, and I personally reviewed the relevant tests, images, and reports as documented above. I formulated and edited as necessary the assessment and plan and discussed the findings and management plan with the patient and family.     Isai Varma MD, MA  Director, Cornea & Anterior Segment  [facility] Department of Ophthalmology & Visual Neuroscience

## 2017-03-30 NOTE — MR AVS SNAPSHOT
After Visit Summary   3/30/2017    Shayne Fiore    MRN: [id number]           Patient Information     Date Of Birth          1950        Visit Information        Provider Department      3/30/2017 2:45 PM Isai Varma MD; [facility]        Today's Diagnoses     Glaucomatous atrophy (cupping) of optic disc, right    -  1    Blind one eye           Follow-ups after your visit        Additional Services     OCCUPATIONAL THERAPY REFERRAL       Low Vision Referral to Elly Meadows                  Your next 10 appointments already scheduled     Apr 07, 2017 10:00 AM CDT   (Arrive by 9:45 AM)   Return Visit with Dolly Granda MD   [facility] Urology and [facility] for Prostate and Urologic Cancers ([facility] [place])    [address] [place] [address]  4th [place] [address] [phone]            Apr 10, 2017 12:00 PM CDT   (Arrive by 11:45 AM)   PAC PHONE RN ASSESSMENT with Uc Pac Rn   [facility] Preoperative Assessment [place] ([facility] [place])    [address] [place] [address]  4th [facility] [address] [phone]           Note: this is not an onsite visit; there is no need to come to the facility.            Apr 12, 2017   Procedure with Felicia Pereira MD   [facility] Surgery and Procedure Center ([facility] [place])    [address] [place] [address]  5th [facility] [address] [phone]           Located in the Clinics and Surgery Center at [address] [place] [address].   parking is very convenient and highly recommended.  is a $6 flat rate fee.  Both  and self parkers should enter the main arrival plaza from [facility]; parking attendants will direct you based on your parking preference.            Apr 12, 2017 11:40 AM CDT   Test/Procedure with  Generic    Services Department ([facility])    [address]  [facility] [address] [phone]            May 03, 2017 10:30 AM CDT   (Arrive by 10:15 AM)   Return Visit with Felicia Pereira MD   [facility] Ear Nose and Throat ([facility] [place])    [address] [facility]  4th [facility] [address] [phone]            May 08, 2017  1:00 PM CDT   Evaluation 90 with Elly Meadows OT   [facility], [place], Occupational Therapy, Vision - Outpatie ([facility])    [address] [facility]  9th Floor, Clinic 9a  [facility] [address]   [phone]            May 30, 2017  9:45 AM CDT   RETURN CORNEA with Isai Varma MD   Eye Clinic ([facility])    [address]  [address] [facility]  [address] Clin 9a  [facility] [address]   [phone]            Aug 01, 2017  1:00 PM CDT   Programmable Hearing Aid Check with Pravin Moran   [facility] Aud[facility] ([facility])    [address] [facility]  4th [facility] [address] [phone]              Who to contact     Please call your clinic at [phone] to:    Ask questions about your health    Make or cancel appointments    Discuss your medicines    Learn about your test results    Speak to your doctor   If you have compliments or concerns about an experience at your clinic, or if you wish to file a complaint, please contact [facility] Patient Relations at [phone] or email us at [email or web address]         Additional Information About Your Visit        MYRhart Information     MSI is an electronic gateway that provides easy, online access to your medical records. With MSI, you can request a clinic appointment, read your test results, renew a prescription or communicate with your care team.     To sign up for Jocoost visit the website at [email or web address]   You will be asked to enter the access code listed below, as  well as some personal information. Please follow the directions to create your username and password.     Your access code is: [email or web address]  Expires: 2017  7:30 AM     Your access code will  in 90 days. If you need help or a new code, please contact your [facility] or call [phone] for assistance.        Care EveryWhere ID     This is your Care EveryWhere ID. This could be used by other organizations to access your [place] medical records  [id number]         Blood Pressure from Last 3 Encounters:   17 156/72   03/10/17 154/65   17 165/67    Weight from Last 3 Encounters:   17 62.2 kg (137 lb 3.2 oz)   03/10/17 63.5 kg (140 lb)   17 61.6 kg (135 lb 12.8 oz)              We Performed the Following     OCCUPATIONAL THERAPY REFERRAL        Primary Care Provider Office Phone # Fax #    Juani SRIKANTH Choudhary [phone]       [facility]  [facility] [address]        Thank you!     Thank you for choosing EYE CLINIC  for your care. Our goal is always to provide you with excellent care. Hearing back from our patients is one way we can continue to improve our services. Please take a few minutes to complete the written survey that you may receive in the mail after your visit with us. Thank you!             Your Updated Medication List - Protect others around you: Learn how to safely use, store and throw away your medicines at www.disposemymeds.org.          This list is accurate as of: 3/30/17  4:57 PM.  Always use your most recent med list.                   Brand Name Dispense Instructions for use    ARTIFICIAL TEARS OP      Apply 1 drop to eye as needed       * ciprofloxacin 500 MG tablet    CIPRO    6 tablet    Take 1 tablet (500 mg) by mouth 2 times daily       * ciprofloxacin 500 MG tablet    CIPRO    1 tablet    Take 1 tablet (500 mg) by mouth 2 times daily       oxybutynin 5 MG 24 hr tablet     DITROPAN-XL    30 tablet    Take 1 tablet (5 mg) by mouth At Bedtime       prednisoLONE acetate 1 % ophthalmic susp    PRED FORTE     Place 1 drop into the right eye daily       tamsulosin 0.4 MG capsule    FLOMAX    90 capsule    Take 1 capsule (0.4 mg) by mouth daily       * Notice:  This list has 2 medication(s) that are the same as other medications prescribed for you. Read the directions carefully, and ask your doctor or other care provider to review them with you.

## 2017-03-30 NOTE — NURSING NOTE
Chief Complaints and History of Present Illnesses   Patient presents with     Post Op (Ophthalmology) Right Eye     6 week post op IOL explantation/ant vit/ACIOL right eye (2/15/17).     HPI    Affected eye(s):  Right   Symptoms:     Blurred vision (Comment: Vision is uncahnged RE, NLP LE.)   Floaters (Comment: RE)   Flashes (Comment: Sometimes looks like when sun hits water in RE since surgery.)   Redness (Comment: BE)   Foreign body sensation (Comment: Feels like something is poking, like sand feeling RE.)   Tearing (Comment: Sometimes when sleeping RE)         Do you have eye pain now?:  No      Comments:  6 week post op IOL explantation/ant vit/ACIOL right eye (2/15/17).    Carlos FAJARDO  3:36 PM03/30/2017

## 2017-03-31 NOTE — PROGRESS NOTES
REASON FOR CONSULTATION:  Shayne Fiore is being seen today for initial consultation at the request of Dr. Dolly Granda for consideration of radiation therapy for unfavorable intermediate-risk prostate cancer, Marco 4 + 3 = 7, clinical [id number] in 5 total cores (1/12 random cores, 4/8 targeted cores), PSA 18.9, with a PI-RADS 5 lesion by MRI without suspicion for extracapsular extension or seminal vesicle invasion.  All pertinent labs, imaging and pathology findings have been reviewed.      HISTORY OF PRESENT ILLNESS:  Mr. Fiore is a 67-year-old gentleman from [facility] who initially was told he had an elevated PSA at a medical clinic in [facility], but underwent no further followup at that time.  He moved to the [place] last year and established medical care here.  He was found to have an elevated PSA at 18.9, for which he was referred to Dr. Granda.  They met on 01/27/2017, and he noted a history of lower urinary tract symptoms, which had previously been managed with tamsulosin.  Oxybutynin had also been tried, but was not helpful.  He also noted a history of urinary tract infection symptoms, including fevers and dysuria.  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      He underwent an MRI of the prostate on 01/27/2017, which was significant for a PI-RADS 5 lesion with several suspicious lesions throughout the transitional zone as well as in the peripheral zone, predominantly near the apex.  No extraprostatic extension was identified nor was seminal vesicle invasion.  No suspicious lymph nodes were noted.  Dr. Granda recommended a prostate biopsy, which was obtained on 02/24/2017.  Pathology ([id number]) was significant for prostate adenocarcinoma, Marco 4 + 3 in the left lateral apex involving 15% of the core and present in 1/12 of the random cores.  A total of 8 lesion-directed cores were obtained, and 4/8 cores were positive, all with Marco 3 + 3 = 6 disease  involving up to 45% of the sampled cores.      He met again with Dr. Granda on 03/10/2017, who discussed treatment options, including a radical prostatectomy with potential robotic assistance and external beam radiation therapy.  Dr. Granda referred him to our clinic for further discussion.  Otherwise, on our visit today, Mr. Fiore reports no new symptoms.  His AUA symptom score today is 17/35 with the most concerning symptoms being urinary frequency at 4/5, a weak urinary stream at 4/5 and nocturia at 4/5.  He denies any bony pains or weight loss and denies hematuria, although due to his blindness, he admits that this could go undetected.      CHEMOTHERAPY HISTORY:  None.      PAST RADIATION THERAPY HISTORY:  None.      PAST MEDICAL HISTORY:  Significant for prostate cancer as per HPI and glaucoma with resultant blindness.      PAST SURGICAL HISTORY:  History of eye surgeries x2, but he is uncertain what type of surgery.  He also has a history of scrotal hematoma evacuation by report.      ALLERGIES:  Sulfa drugs.      MEDICATIONS:   1.  Prednisolone ophthalmic suspension.   2.  Tamsulosin 0.4 mg p.o. daily.   3.  Artificial tears.      FAMILY HISTORY:  History of glaucoma in his sister and his cousin.  No pertinent oncologic family history.      SOCIAL HISTORY:  He is .  He is a lifelong never smoker and does not drink alcohol.      REVIEW OF SYSTEMS:  A full 10 point review of systems was obtained and is negative other than as stated in HPI.      PHYSICAL EXAMINATION:   VITAL SIGNS:  Blood pressure 156/72, pulse 54, weight 137 pounds.   GENERAL:  He appears well, alert, oriented, in no acute distress.   CARDIOVASCULAR:  Normal rate, regular rhythm.  Good distal perfusion.   RESPIRATORY:  Clear to auscultation bilaterally.  No wheezes, rales or rhonchi.  Breathing comfortably on room air.   GENITOURINARY:  Normal rectal tone.  Smooth prostate without palpable nodularity.  No blood on exam glove.    PSYCHIATRIC:  Appropriate mood and affect.      ECOG PERFORMANCE STATUS:  1      IMPLANTED CARDIAC DEVICE:  None.      IMPRESSION:  Mr. Fiore is a 67-year-old gentleman with unfavorable intermediate-risk prostate cancer, Marco 4 + 3 = 7, clinical [id number] in a total of 5/20 cores (1/12 random cores, 4/8 targeted cores), PSA 18.9, with no evidence of extracapsular extension or seminal vesicle invasion.      RECOMMENDATION:  Because of his unfavorable intermediate-risk disease status, we recommend treatment rather than continued observation or active surveillance.  We explained that treatment options include a radical prostatectomy as well as external beam radiation therapy with concurrent short-term (4-6 months) androgen deprivation therapy.  We explained that surgery does appear to be a good option given his overall good medical health and apparent localized disease by MRI.  We also discussed radiation therapy with concurrent short-term androgen deprivation therapy as a reasonable option.      We reviewed the rationale, logistics, risks, benefits and potential side effects of radiation therapy and short-term deprivation therapy.  We encouraged the patient as well as his son to ask questions, which were answered to the best of our ability.  They stated that they would like to discuss this with his family before making a final decision.  We provided them with our contact information and encouraged them to call our clinic or the Urology Clinic with any further questions about radiation therapy or surgery.      The patient was seen and discussed with staff, Dr. Clements. Thank you for involving us in the care of this patient.  Please feel free to contact us with questions or concerns at any time.      Tj Morales MD PGY-3  Radiation Oncology Resident, [facility]  Phone: [phone]    I saw the patient with the resident.  I agree with the resident's note and plan of care.      HARSH Clements,  M.D.  Department of Radiation Oncology  [facility]    CC  Patient Care Team:  Juani Choudhary NP as PCP - General  Petra Barrera PA as Physician Assistant (Physician Assistant)  Ciara Fallon MD as MD (Ophthalmology)  Juani Choudhary NP as Referring Physician  Mariaelena Lisa RN as Clinic Care Coordinator (Nurse)  PHYLLIS GUILLEN

## 2017-04-07 ENCOUNTER — OFFICE VISIT (OUTPATIENT)
Dept: UROLOGY | Facility: CLINIC | Age: 67
End: 2017-04-07

## 2017-04-07 VITALS
BODY MASS INDEX: 17.58 KG/M2 | WEIGHT: 137 LBS | HEIGHT: 74 IN | HEART RATE: 61 BPM | DIASTOLIC BLOOD PRESSURE: 66 MMHG | SYSTOLIC BLOOD PRESSURE: 156 MMHG

## 2017-04-07 DIAGNOSIS — C61 PROSTATE CANCER (H): Primary | ICD-10-CM

## 2017-04-07 ASSESSMENT — PAIN SCALES - GENERAL: PAINLEVEL: NO PAIN (0)

## 2017-04-07 NOTE — PROGRESS NOTES
"Office Visit Note      UROLOGIC DIAGNOSES:   Elevated PSA, lower urinary tract symptoms      CURRENT INTERVENTIONS:       HISTORY:     Mr. 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      Patient had MRI prostate that showed several areas of suspicion.   S/p MRI guided prostate biopsy with three areas of henrique 3+3 prostate cancer and one core of henrique 4+3.   We reviewed again EBRT vs RALP including r/b/a and side effects of each in the long and short term.       PAST MEDICAL HISTORY:   Past Medical History:   Diagnosis Date     Prostate cancer (H) 02/2017       PAST SURGICAL HISTORY:   Past Surgical History:   Procedure Laterality Date     CATARACT IOL, RT/LT Right 02/15/2017    IOL explantation/ant vit/ACIOL     EYE SURGERY Right     x 2, unsure what type of surgery     scrotal hematoma Right        FAMILY HISTORY:   Family History   Problem Relation Age of Onset     Glaucoma Sister      Glaucoma Cousin      Macular Degeneration No family hx of        SOCIAL HISTORY:   Social History   Substance Use Topics     Smoking status: Never Smoker     Smokeless tobacco: Never Used     Alcohol use Not on file       Current Outpatient Prescriptions   Medication     prednisoLONE acetate (PRED FORTE) 1 % ophthalmic susp     Hypromellose (ARTIFICIAL TEARS OP)     No current facility-administered medications for this visit.          PHYSICAL EXAM:    /66  Pulse 61  Ht 1.88 m (6' 2\")  Wt 62.1 kg (137 lb)  BMI 17.59 kg/m2    HEENT: Normocephalic and atraumatic   Cardiac: Not done  Back/Flank: Not done  CNS/PNS: Not done  Respiratory: Normal non-labored breathing  Abdomen: Soft nontender and nondistended  Peripheral Vascular: Not done  Mental Status: Not done    Penis: Not done  Scrotal Skin: Not done  Testicles: Not done  Epididymis: Not done  Digital Rectal Exam:     Cystoscopy: Not done    Imaging: None    Urinalysis: UA RESULTS:  Recent Labs   Lab Test  12/13/16   " 1700   COLOR  Yellow   APPEARANCE  Slightly Cloudy   URINEGLC  Negative   URINEBILI  Negative   URINEKETONE  Negative   SG  1.018   UBLD  Small*   URINEPH  5.0   PROTEIN  Negative   NITRITE  Negative   LEUKEST  Trace*   RBCU  <1   WBCU  2       PSA: 18.87    Post Void Residual:     Other labs: None today      IMPRESSION:  66 y/o M with prostate cancer    PLAN:  Patient would like to further consider RALP   Will follow up in [place] office     Total Time: 15 minutes                                       Total in Consultation: greater than 50%

## 2017-04-07 NOTE — NURSING NOTE
"Chief Complaint   Patient presents with     RECHECK     Prostate cancer follow up       Blood pressure 156/66, pulse 61, height 1.88 m (6' 2\"), weight 62.1 kg (137 lb). Body mass index is 17.59 kg/(m^2).    Patient Active Problem List   Diagnosis     Primary open angle glaucoma of both eyes, severe stage     Senile nuclear sclerosis, left     Pseudophakia of right eye     Central pterygium, left     Dislocated IOL (intraocular lens), posterior, right     Anemia     Benign prostatic hyperplasia with urinary obstruction     Elevated prostate specific antigen (PSA)     Presence of intraocular lens     Nonspecific reaction to tuberculin test     Underweight       Allergies   Allergen Reactions     Sulfa Drugs Itching       Current Outpatient Prescriptions   Medication Sig Dispense Refill     prednisoLONE acetate (PRED FORTE) 1 % ophthalmic susp Place 1 drop into the right eye daily       Hypromellose (ARTIFICIAL TEARS OP) Apply 1 drop to eye as needed         Social History   Substance Use Topics     Smoking status: Never Smoker     Smokeless tobacco: Never Used     Alcohol use Not on file       CAM Henry  4/7/2017  10:23 AM       "

## 2017-04-07 NOTE — MR AVS SNAPSHOT
After Visit Summary   4/7/2017    Shayne Fiore    MRN: [id number]           Patient Information     Date Of Birth          1950        Visit Information        Provider Department      4/7/2017 9:45 AM Diogenes Wood; Dolly Granda MD [facility] Urology and [facility] for Prostate and Urologic Cancers         Follow-ups after your visit        Your next 10 appointments already scheduled     Apr 10, 2017 12:00 PM CDT   (Arrive by 11:45 AM)   PAC PHONE RN ASSESSMENT with Uc Pac Rn   [facility] Preoperative Assessment Center ([facility])    [address] [place] [address]  4th [facility] [address] [phone]           Note: this is not an onsite visit; there is no need to come to the facility.            Apr 12, 2017   Procedure with Felicia Pereira MD   [facility] Surgery and Procedure Center ([facility])    [address] [place] [address]  5th [facility] [address] [phone]           Located in the Clinics and Surgery Center at [address] [place] [address].   parking is very convenient and highly recommended.  is a $6 flat rate fee.  Both  and self parkers should enter the main arrival plaza from [facility]; parking attendants will direct you based on your parking preference.            Apr 12, 2017 11:40 AM CDT   Test/Procedure with  Generic    Services Department ([facility])    [address] [place] [address] [phone]            Apr 20, 2017  1:40 PM CDT   (Arrive by 1:30 PM)   Return Visit with Dolly Granda MD   [facility] ([facility])    [address]  [address]  [facility] [address] [phone]            May 03, 2017 10:30 AM CDT   (Arrive by 10:15 AM)   Return Visit with Felicia Pereira MD   [facility] Ear Nose and Throat [facility]  and [facility] Center)    [address] [facility]  4th [facility] [address]   [phone]            May 08, 2017  1:00 PM CDT   Evaluation 90 with Elly Meadows OT   [facility], Liz, Occupational Therapy, Vision - Outpatie ([facility])    [address]  9th Floor, Clinic 9a  [facility] [address]   [phone]            May 30, 2017  9:45 AM CDT   RETURN CORNEA with Isai Varma MD   Eye Clinic ([facility])    [address]  [address] [address]  [facility] [address]   [phone]            Aug 01, 2017  1:00 PM CDT   Programmable Hearing Aid Check with Scarlett Sanchez [facility] ([facility])    [address]  4th [facility] [address] [phone]              Who to contact     Please call your clinic at [phone] to:    Ask questions about your health    Make or cancel appointments    Discuss your medicines    Learn about your test results    Speak to your doctor   If you have compliments or concerns about an experience at your clinic, or if you wish to file a complaint, please contact [facility] Patient Relations at [phone] or email us at [email or web address]         Additional Information About Your Visit        Mobile Patrolhart Information     Plivot is an electronic gateway that provides easy, online access to your medical records. With OrthoScan, you can request a clinic appointment, read your test results, renew a prescription or communicate with your care team.     To sign up for Plivot visit the website at [email or web address]   You will be asked to enter the access code listed below, as well as some personal information. Please follow the directions to create your username and password.     Your access code is: [email or web address]  Expires: 2017  7:30 AM     Your access code will  in  "90 days. If you need help or a new code, please contact your [facility] or call [phone] for assistance.        Care EveryWhere ID     This is your Care EveryWhere ID. This could be used by other organizations to access your [facility] medical records  [id number]        Your Vitals Were     Pulse Height BMI (Body Mass Index)             61 1.88 m (6' 2\") 17.59 kg/m2          Blood Pressure from Last 3 Encounters:   04/07/17 156/66   03/29/17 156/72   03/10/17 154/65    Weight from Last 3 Encounters:   04/07/17 62.1 kg (137 lb)   03/29/17 62.2 kg (137 lb 3.2 oz)   03/10/17 63.5 kg (140 lb)              Today, you had the following     No orders found for display       Primary Care Provider Office Phone # Fax #    Juani GroveSRIKANTH zee [phone]       [facility] [address] [facility] [address]        Thank you!     Thank you for choosing [facility] UROLOGY AND [facility] FOR PROSTATE AND UROLOGIC CANCERS  for your care. Our goal is always to provide you with excellent care. Hearing back from our patients is one way we can continue to improve our services. Please take a few minutes to complete the written survey that you may receive in the mail after your visit with us. Thank you!             Your Updated Medication List - Protect others around you: Learn how to safely use, store and throw away your medicines at www.disposemymeds.org.          This list is accurate as of: 4/7/17 11:04 AM.  Always use your most recent med list.                   Brand Name Dispense Instructions for use    ARTIFICIAL TEARS OP      Apply 1 drop to eye as needed       prednisoLONE acetate 1 % ophthalmic susp    PRED FORTE     Place 1 drop into the right eye daily         "

## 2017-04-07 NOTE — LETTER
"4/7/2017       RE: Shayne Fiore  [address] [place] [address]     Dear Colleague,    Thank you for referring your patient, Shayne Fiore, to the [facility] UROLOGY AND INST FOR PROSTATE AND UROLOGIC CANCERS at [facility]. Please see a copy of my visit note below.    Office Visit Note      UROLOGIC DIAGNOSES:   Elevated PSA, lower urinary tract symptoms      CURRENT INTERVENTIONS:       HISTORY:     Mr. 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      Patient had MRI prostate that showed several areas of suspicion.   S/p MRI guided prostate biopsy with three areas of henrique 3+3 prostate cancer and one core of henrique 4+3.   We reviewed again EBRT vs RALP including r/b/a and side effects of each in the long and short term.       PAST MEDICAL HISTORY:   Past Medical History:   Diagnosis Date     Prostate cancer (H) 02/2017       PAST SURGICAL HISTORY:   Past Surgical History:   Procedure Laterality Date     CATARACT IOL, RT/LT Right 02/15/2017    IOL explantation/ant vit/ACIOL     EYE SURGERY Right     x 2, unsure what type of surgery     scrotal hematoma Right        FAMILY HISTORY:   Family History   Problem Relation Age of Onset     Glaucoma Sister      Glaucoma Cousin      Macular Degeneration No family hx of        SOCIAL HISTORY:   Social History   Substance Use Topics     Smoking status: Never Smoker     Smokeless tobacco: Never Used     Alcohol use Not on file       Current Outpatient Prescriptions   Medication     prednisoLONE acetate (PRED FORTE) 1 % ophthalmic susp     Hypromellose (ARTIFICIAL TEARS OP)     No current facility-administered medications for this visit.          PHYSICAL EXAM:    /66  Pulse 61  Ht 1.88 m (6' 2\")  Wt 62.1 kg (137 lb)  BMI 17.59 kg/m2    HEENT: Normocephalic and atraumatic   Cardiac: Not done  Back/Flank: Not done  CNS/PNS: Not done  Respiratory: " Normal non-labored breathing  Abdomen: Soft nontender and nondistended  Peripheral Vascular: Not done  Mental Status: Not done    Penis: Not done  Scrotal Skin: Not done  Testicles: Not done  Epididymis: Not done  Digital Rectal Exam:     Cystoscopy: Not done    Imaging: None    Urinalysis: UA RESULTS:  Recent Labs   Lab Test  12/13/16   1700   COLOR  Yellow   APPEARANCE  Slightly Cloudy   URINEGLC  Negative   URINEBILI  Negative   URINEKETONE  Negative   SG  1.018   UBLD  Small*   URINEPH  5.0   PROTEIN  Negative   NITRITE  Negative   LEUKEST  Trace*   RBCU  <1   WBCU  2       PSA: 18.87    Post Void Residual:     Other labs: None today      IMPRESSION:  68 y/o M with prostate cancer    PLAN:  Patient would like to further consider RALP   Will follow up in [place] office     Total Time: 15 minutes                                       Total in Consultation: greater than 50%                       Again, thank you for allowing me to participate in the care of your patient.      Sincerely,    Dolly Granda MD

## 2017-04-10 ENCOUNTER — APPOINTMENT (OUTPATIENT)
Dept: SURGERY | Facility: CLINIC | Age: 67
End: 2017-04-10

## 2017-04-10 ENCOUNTER — CARE COORDINATION (OUTPATIENT)
Dept: OTOLARYNGOLOGY | Facility: CLINIC | Age: 67
End: 2017-04-10

## 2017-04-10 NOTE — PROGRESS NOTES
Spoke with pt son, Nelson [phone], Pt is having up coming  prostrate surgery- son/pt would like to cancel tympanoplasty surgery with Dr. Pereira on 4-12-17, they will call to reschedule after pt has recovered from prostrate surgery. Dr. Pereira and surgery scheduler notified.

## 2017-04-17 ENCOUNTER — TELEPHONE (OUTPATIENT)
Dept: OPHTHALMOLOGY | Facility: CLINIC | Age: 67
End: 2017-04-17

## 2017-04-17 NOTE — TELEPHONE ENCOUNTER
I spoke with patient's son who states his Father has a red eye with ongoing pain for a week.  I offered him an appointment today and every day this week.

## 2017-04-18 ENCOUNTER — OFFICE VISIT (OUTPATIENT)
Dept: OPHTHALMOLOGY | Facility: CLINIC | Age: 67
End: 2017-04-18
Attending: OPHTHALMOLOGY
Payer: COMMERCIAL

## 2017-04-18 DIAGNOSIS — T85.22XD DISLOCATED IOL (INTRAOCULAR LENS), SUBSEQUENT ENCOUNTER: Primary | ICD-10-CM

## 2017-04-18 DIAGNOSIS — H40.1133 PRIMARY OPEN ANGLE GLAUCOMA OF BOTH EYES, SEVERE STAGE: ICD-10-CM

## 2017-04-18 DIAGNOSIS — R97.20 ELEVATED PROSTATE SPECIFIC ANTIGEN (PSA): Primary | ICD-10-CM

## 2017-04-18 PROCEDURE — 99214 OFFICE O/P EST MOD 30 MIN: CPT | Mod: ZF

## 2017-04-18 RX ORDER — NEOMYCIN SULFATE, POLYMYXIN B SULFATE, AND DEXAMETHASONE 3.5; 10000; 1 MG/G; [USP'U]/G; MG/G
1 OINTMENT OPHTHALMIC 4 TIMES DAILY
Qty: 1 TUBE | Refills: 3 | Status: SHIPPED | OUTPATIENT
Start: 2017-04-18 | End: 2017-04-20

## 2017-04-18 ASSESSMENT — CONF VISUAL FIELD
OD_SUPERIOR_NASAL_RESTRICTION: 3
OS_SUPERIOR_TEMPORAL_RESTRICTION: 1
OD_INFERIOR_TEMPORAL_RESTRICTION: 3
OS_SUPERIOR_NASAL_RESTRICTION: 1
OD_INFERIOR_NASAL_RESTRICTION: 3
OD_SUPERIOR_TEMPORAL_RESTRICTION: 3
OS_INFERIOR_NASAL_RESTRICTION: 1
OS_INFERIOR_TEMPORAL_RESTRICTION: 1

## 2017-04-18 ASSESSMENT — SLIT LAMP EXAM - LIDS: COMMENTS: NORMAL

## 2017-04-18 ASSESSMENT — TONOMETRY
IOP_METHOD: ICARE
OD_IOP_MMHG: 7
OS_IOP_MMHG: 53

## 2017-04-18 ASSESSMENT — VISUAL ACUITY
OS_SC: NLP
OD_PH_SC: 20/100
METHOD: SNELLEN - LINEAR
OD_SC: 20/200

## 2017-04-18 NOTE — MR AVS SNAPSHOT
After Visit Summary   4/18/2017    Shayne Fiore    MRN: [id number]           Patient Information     Date Of Birth          1950        Visit Information        Provider Department      4/18/2017 8:30 AM Isai Varma MD; [facility] LANGUAGE SERVICES Eye Clinic        Today's Diagnoses     Dislocated IOL (intraocular lens), subsequent encounter    -  1    Primary open angle glaucoma of both eyes, severe stage           Follow-ups after your visit        Follow-up notes from your care team     Return in about 4 weeks (around 5/16/2017).      Your next 10 appointments already scheduled     Apr 20, 2017  1:40 PM CDT   (Arrive by 1:30 PM)   Return Visit with Dolly Granda MD   [facility] [place] ([facility] [place])    [address]  [address]  [facility] [address]   [phone]            May 08, 2017  1:00 PM CDT   Evaluation 90 with Elly Meadows OT   [facility], [place], Occupational Therapy, Vision - Outpatie ([facility], [facility])    [address]  9th Floor, Clinic 9a  [facility] [address]   [phone]            May 09, 2017  8:30 AM CDT   Post-Op with Isai Vrama MD   Eye Clinic ([facility])    [address]  [address] [address]   [phone]            May 30, 2017  9:45 AM CDT   RETURN CORNEA with Isai Varma MD   Eye Clinic ([facility])    [address]  [address] [address]   [phone]            Aug 01, 2017  1:00 PM CDT   Programmable Hearing Aid Check with Scarlett Sanchez [facility] Audiology ([facility])    [address]  4th Floor  [facility] [address] [phone]              Future tests that were ordered for you today     Open Future Orders        Priority Expected Expires Ordered    UA without  Microscopic Routine  2018    Urine Culture Aerobic Bacterial Routine  2018            Who to contact     Please call your clinic at [phone] to:    Ask questions about your health    Make or cancel appointments    Discuss your medicines    Learn about your test results    Speak to your doctor   If you have compliments or concerns about an experience at your clinic, or if you wish to file a complaint, please contact [facility] Patient Relations at [phone] or email us at [email or web address]         Additional Information About Your Visit        Sponsify Information     Sponsify is an electronic gateway that provides easy, online access to your medical records. With Sponsify, you can request a clinic appointment, read your test results, renew a prescription or communicate with your care team.     To sign up for Sponsify visit the website at [email or web address]   You will be asked to enter the access code listed below, as well as some personal information. Please follow the directions to create your username and password.     Your access code is: [email or web address]  Expires: 2017  7:30 AM     Your access code will  in 90 days. If you need help or a new code, please contact your [facility] or call [phone] for assistance.        Care EveryWhere ID     This is your Care EveryWhere ID. This could be used by other organizations to access your [place] medical records  [id number]         Blood Pressure from Last 3 Encounters:   17 156/66   17 156/72   03/10/17 154/65    Weight from Last 3 Encounters:   17 62.1 kg (137 lb)   17 62.2 kg (137 lb 3.2 oz)   03/10/17 63.5 kg (140 lb)              Today, you had the following     No orders found for display         Today's Medication Changes          These changes are accurate as of: 17 10:47 AM.  If you have any questions, ask  your nurse or doctor.               Start taking these medicines.        Dose/Directions    neomycin-polymyxin-dexamethasone 3.5-26773-3.1 Oint ophthalmic ointment   Commonly known as:  MAXITROL   Used for:  Dislocated IOL (intraocular lens), subsequent encounter, Primary open angle glaucoma of both eyes, severe stage   Started by:  Isai Varma MD        Dose:  1 Application   Place 1 Application into the right eye 4 times daily   Quantity:  1 Tube   Refills:  3            Where to get your medicines      These medications were sent to [id number] [[address]  [address] [facility] [address]     Phone:  [phone]     neomycin-polymyxin-dexamethasone 3.5-24613-0.1 Oint ophthalmic ointment                Primary Care Provider Office Phone # Fax #    Juani ChoudharySRIKANTH [phone]       [facility] [address] [place] [address]        Thank you!     Thank you for choosing EYE CLINIC  for your care. Our goal is always to provide you with excellent care. Hearing back from our patients is one way we can continue to improve our services. Please take a few minutes to complete the written survey that you may receive in the mail after your visit with us. Thank you!             Your Updated Medication List - Protect others around you: Learn how to safely use, store and throw away your medicines at www.disposemymeds.org.          This list is accurate as of: 4/18/17 10:47 AM.  Always use your most recent med list.                   Brand Name Dispense Instructions for use    ARTIFICIAL TEARS OP      Apply 1 drop to eye as needed       neomycin-polymyxin-dexamethasone 3.5-08991-9.1 Oint ophthalmic ointment    MAXITROL    1 Tube    Place 1 Application into the right eye 4 times daily       prednisoLONE acetate 1 % ophthalmic susp    PRED FORTE     Place 1 drop into the right eye daily

## 2017-04-18 NOTE — NURSING NOTE
Chief Complaints and History of Present Illnesses   Patient presents with     Red Eye Right Eye     HPI    Last Eye Exam:  3/30/17   Affected eye(s):  Right   Symptoms:     Blurred vision   Redness   Tearing   Itching      Duration:  1 week   Frequency:  Constant       Do you have eye pain now?:  Yes   Location:  OD   Pain Level:  Extreme Pain (8), Worst Pain (10)   Other:  Complains of a heavy feeling, cold feeling.    Pain Frequency:  Constant      Comments:  Pt complains of red and painful eye, onset 1 weeks. A lot of tearing itching and eye painful. Here with son today along with . Vision is stable, remains very blurry. Notes that he is using At's prn which is helpful. WILLIAM CERRATO COA 9:05 AM 04/18/2017

## 2017-04-18 NOTE — PROGRESS NOTES
CC: Red and painful right eye.     Right eye painful, itchy, and burning x 2 weeks. Currently only using tears BID. The tears help with the pain and burning, but only for a few seconds.     A/P:    1. POM #2 IOL explantation/ant vit/ACIOL right eye (2/15/17).    Vision limited by advanced glaucoma.    Irritation / pain last 2 weeks    Irregular epithelium nasally noted at last visit. Stable exam.  Limbal conj epithelial staining / irregularity / inflammation nasally  Loose sut temporally    Remove loose sut    Add maxitrol violet four times a day     Increase lubrication to 6x/day with PFAT.     Low vision eval - tint, refraction, etc scheduled     Return to clinic 3-4 weeks earlier as needed     Eder Fletcher MD  Ophthalmology resident, PGY-3        ~~~~~~~~~~~~~~~~~~~~~~~~~~~~~~~~~~~~~~~~~~~~~~~~~~~~~~~~~~~~~~~~    Complete documentation of historical and exam elements from today's encounter can be found in the full encounter summary report (not reduplicated in this progress note). I personally obtained the chief complaint(s) and history of present illness.  I confirmed and edited as necessary the review of systems, past medical/surgical history, family history, social history, and examination findings as documented by others.  I examined the patient myself, and I personally reviewed the relevant tests, images, and reports as documented above. I formulated and edited as necessary the assessment and plan and discussed the findings and management plan with the patient and family.     Isai Varma MD, MA  Director, Cornea & Anterior Segment  [facility] Department of Ophthalmology & Visual Neuroscience

## 2017-04-20 ENCOUNTER — OFFICE VISIT (OUTPATIENT)
Dept: UROLOGY | Facility: CLINIC | Age: 67
End: 2017-04-20
Payer: COMMERCIAL

## 2017-04-20 ENCOUNTER — TELEPHONE (OUTPATIENT)
Dept: OPHTHALMOLOGY | Facility: CLINIC | Age: 67
End: 2017-04-20

## 2017-04-20 VITALS
HEIGHT: 74 IN | DIASTOLIC BLOOD PRESSURE: 76 MMHG | SYSTOLIC BLOOD PRESSURE: 158 MMHG | WEIGHT: 137 LBS | BODY MASS INDEX: 17.58 KG/M2 | HEART RATE: 72 BPM

## 2017-04-20 DIAGNOSIS — R97.20 ELEVATED PROSTATE SPECIFIC ANTIGEN (PSA): ICD-10-CM

## 2017-04-20 DIAGNOSIS — C61 MALIGNANT NEOPLASM OF PROSTATE (H): Primary | ICD-10-CM

## 2017-04-20 DIAGNOSIS — N40.1 BENIGN PROSTATIC HYPERPLASIA WITH URINARY OBSTRUCTION: Primary | ICD-10-CM

## 2017-04-20 DIAGNOSIS — N13.8 BENIGN PROSTATIC HYPERPLASIA WITH URINARY OBSTRUCTION: Primary | ICD-10-CM

## 2017-04-20 LAB
ALBUMIN UR-MCNC: NEGATIVE MG/DL
APPEARANCE UR: CLEAR
BILIRUB UR QL STRIP: NEGATIVE
COLOR UR AUTO: YELLOW
GLUCOSE UR STRIP-MCNC: NEGATIVE MG/DL
HGB UR QL STRIP: ABNORMAL
KETONES UR STRIP-MCNC: NEGATIVE MG/DL
LEUKOCYTE ESTERASE UR QL STRIP: NEGATIVE
NITRATE UR QL: NEGATIVE
PH UR STRIP: 5.5 PH (ref 5–7)
SP GR UR STRIP: 1.02 (ref 1–1.03)
URN SPEC COLLECT METH UR: ABNORMAL
UROBILINOGEN UR STRIP-ACNC: 0.2 EU/DL (ref 0.2–1)

## 2017-04-20 PROCEDURE — 81003 URINALYSIS AUTO W/O SCOPE: CPT | Performed by: UROLOGY

## 2017-04-20 PROCEDURE — 87086 URINE CULTURE/COLONY COUNT: CPT | Performed by: UROLOGY

## 2017-04-20 PROCEDURE — 99213 OFFICE O/P EST LOW 20 MIN: CPT | Performed by: UROLOGY

## 2017-04-20 RX ORDER — OFLOXACIN 3 MG/ML
SOLUTION/ DROPS OPHTHALMIC
COMMUNITY
Start: 2017-02-15 | End: 2017-04-20

## 2017-04-20 ASSESSMENT — PAIN SCALES - GENERAL: PAINLEVEL: SEVERE PAIN (6)

## 2017-04-20 NOTE — PROGRESS NOTES
"Office Visit Note      UROLOGIC DIAGNOSES:   Elevated PSA, lower urinary tract symptoms      CURRENT INTERVENTIONS:       HISTORY:     Mr. 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      Patient had MRI prostate that showed several areas of suspicion.   S/p MRI guided prostate biopsy with three areas of henrique 3+3 prostate cancer and one core of henrique 4+3.       We discussed RALP, two weeks of catheter drainage, post op restrictions.   Also discussed timing of surgery given upcoming Ramadan.         PAST MEDICAL HISTORY:   Past Medical History:   Diagnosis Date     Prostate cancer (H) 02/2017       PAST SURGICAL HISTORY:   Past Surgical History:   Procedure Laterality Date     CATARACT IOL, RT/LT Right 02/15/2017    IOL explantation/ant vit/ACIOL     EYE SURGERY Right     x 2, unsure what type of surgery     scrotal hematoma Right      TESTICLE SURGERY         FAMILY HISTORY:   Family History   Problem Relation Age of Onset     Glaucoma Sister      Glaucoma Cousin      Macular Degeneration No family hx of        SOCIAL HISTORY:   Social History   Substance Use Topics     Smoking status: Never Smoker     Smokeless tobacco: Never Used     Alcohol use Not on file       Current Outpatient Prescriptions   Medication     Hypromellose (ARTIFICIAL TEARS OP)     No current facility-administered medications for this visit.          PHYSICAL EXAM:    /76 (BP Location: Left arm, Patient Position: Chair, Cuff Size: Adult Regular)  Pulse 72  Ht 1.88 m (6' 2\")  Wt 62.1 kg (137 lb)  BMI 17.59 kg/m2    HEENT: Normocephalic and atraumatic   Cardiac: Not done  Back/Flank: Not done  CNS/PNS: Not done  Respiratory: Normal non-labored breathing  Abdomen: Soft nontender and nondistended  Peripheral Vascular: Not done  Mental Status: Not done    Penis: Not done  Scrotal Skin: Not done  Testicles: Not done  Epididymis: Not done  Digital Rectal Exam:     Cystoscopy: Not " done    Imaging: None    Urinalysis: UA RESULTS:  Recent Labs   Lab Test  12/13/16   1700   COLOR  Yellow   APPEARANCE  Slightly Cloudy   URINEGLC  Negative   URINEBILI  Negative   URINEKETONE  Negative   SG  1.018   UBLD  Small*   URINEPH  5.0   PROTEIN  Negative   NITRITE  Negative   LEUKEST  Trace*   RBCU  <1   WBCU  2       PSA: 18.87    Post Void Residual:     Other labs: None today      IMPRESSION:  66 y/o M with prostate cancer    PLAN:  Schedule for RALP     Total Time: 15 minutes                                       Total in Consultation: greater than 50%

## 2017-04-20 NOTE — TELEPHONE ENCOUNTER
[facility] Prior Authorization Team   Phone: [phone]  Fax: [phone]    PA Initiation    Medication: neomycin-polymyxin-dexamethasone (MAXITROL) 3.5-02450-2.1 OINT ophthalmic ointment  Insurance Company: [employer] - Phone [phone] Fax [phone]  Pharmacy Filling the Rx: [id number] [[address]  Filling Pharmacy Phone: [phone]  Filling Pharmacy Fax: [phone]  Start Date: 4/20/2017

## 2017-04-20 NOTE — LETTER
"4/20/2017       RE: Shayne Fiore  [address] [place] [address]     Dear Colleague,    Thank you for referring your patient, Shayne Fiore, to the [facility] at [facility]. Please see a copy of my visit note below.    Office Visit Note    UROLOGIC DIAGNOSES:   Elevated PSA, lower urinary tract symptoms      CURRENT INTERVENTIONS:     HISTORY:     Mr. 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      Patient had MRI prostate that showed several areas of suspicion.   S/p MRI guided prostate biopsy with three areas of henrique 3+3 prostate cancer and one core of henrique 4+3.     We discussed RALP, two weeks of catheter drainage, post op restrictions.   Also discussed timing of surgery given upcoming Ramadan.     PAST MEDICAL HISTORY:   Past Medical History:   Diagnosis Date     Prostate cancer (H) 02/2017       PAST SURGICAL HISTORY:   Past Surgical History:   Procedure Laterality Date     CATARACT IOL, RT/LT Right 02/15/2017    IOL explantation/ant vit/ACIOL     EYE SURGERY Right     x 2, unsure what type of surgery     scrotal hematoma Right      TESTICLE SURGERY         FAMILY HISTORY:   Family History   Problem Relation Age of Onset     Glaucoma Sister      Glaucoma Cousin      Macular Degeneration No family hx of        SOCIAL HISTORY:   Social History   Substance Use Topics     Smoking status: Never Smoker     Smokeless tobacco: Never Used     Alcohol use Not on file       Current Outpatient Prescriptions   Medication     Hypromellose (ARTIFICIAL TEARS OP)     No current facility-administered medications for this visit.          PHYSICAL EXAM:    /76 (BP Location: Left arm, Patient Position: Chair, Cuff Size: Adult Regular)  Pulse 72  Ht 1.88 m (6' 2\")  Wt 62.1 kg (137 lb)  BMI 17.59 kg/m2    HEENT: Normocephalic and atraumatic   Cardiac: Not " done  Back/Flank: Not done  CNS/PNS: Not done  Respiratory: Normal non-labored breathing  Abdomen: Soft nontender and nondistended  Peripheral Vascular: Not done  Mental Status: Not done    Penis: Not done  Scrotal Skin: Not done  Testicles: Not done  Epididymis: Not done  Digital Rectal Exam:     Cystoscopy: Not done    Imaging: None    Urinalysis: UA RESULTS:  Recent Labs   Lab Test  12/13/16   1700   COLOR  Yellow   APPEARANCE  Slightly Cloudy   URINEGLC  Negative   URINEBILI  Negative   URINEKETONE  Negative   SG  1.018   UBLD  Small*   URINEPH  5.0   PROTEIN  Negative   NITRITE  Negative   LEUKEST  Trace*   RBCU  <1   WBCU  2       PSA: 18.87    Post Void Residual:     Other labs: None today      IMPRESSION:  68 y/o M with prostate cancer    PLAN:  Schedule for RALP     Total Time: 15 minutes                                       Total in Consultation: greater than 50%     Again, thank you for allowing me to participate in the care of your patient.      Sincerely,    Dolly Granda MD

## 2017-04-20 NOTE — MR AVS SNAPSHOT
After Visit Summary   4/20/2017    Shayne Fiore    MRN: [id number]           Patient Information     Date Of Birth          1950        Visit Information        Provider Department      4/20/2017 1:30 PM Dolly Granda MD; [facility] [place]        Today's Diagnoses     Malignant neoplasm of prostate (H)    -  1    Elevated prostate specific antigen (PSA)           Follow-ups after your visit        Your next 10 appointments already scheduled     May 08, 2017  1:00 PM CDT   Evaluation 90 with Elly Meadows OT   [facility], [place], Occupational Therapy, Vision - Outpatie ([facility])    [address]  9th Floor, Clinic 9a  [facility] [address]   [phone]            May 09, 2017  8:30 AM CDT   Post-Op with Isai Varma MD   Eye Clinic ([facility])    [address]  [address]  9th Fl Clin 9a  [facility] [address]   [phone]            May 30, 2017  9:45 AM CDT   RETURN CORNEA with Isai Varma MD   Eye Clinic ([facility])    [address]  [address]  [address]  [facility] [address]   [phone]            Jul 13, 2017   Procedure with Bruno Vazquez MD   [facility] PeriOP Services (--)    [address]  [facility] [address]   [phone]            Jul 13, 2017 12:00 PM CDT    with  Generic    Services Department ([facility])    [address] [place] [address] [phone]            Jul 25, 2017  1:20 PM CDT   Return Visit with Dolly Granda MD   [facility] [place] ([facility] [place])    [address]  [address]  [facility] [address] [phone]            Aug 01, 2017  1:00 PM CDT   Programmable  "Hearing Aid Check with Scarlett Sanchez [facility] Audiology ([facility] [place])    [address]  4th [facility] [address] [phone]              Future tests that were ordered for you today     Open Future Orders        Priority Expected Expires Ordered    MEASURE POST-VOID RESIDUAL URINE/BLADDER CAPACITY, US NON-IMAGING (14693) Routine  2018            Who to contact     If you have questions or need follow up information about today's clinic visit or your schedule please contact [facility] [place] directly at [phone].  Normal or non-critical lab and imaging results will be communicated to you by PlaceFirsthart, letter or phone within 4 business days after the clinic has received the results. If you do not hear from us within 7 days, please contact the clinic through PlaceFirsthart or phone. If you have a critical or abnormal lab result, we will notify you by phone as soon as possible.  Submit refill requests through CaseRev or call your pharmacy and they will forward the refill request to us. Please allow 3 business days for your refill to be completed.          Additional Information About Your Visit        PlaceFirstharZeniMax Information     CaseRev lets you send messages to your doctor, view your test results, renew your prescriptions, schedule appointments and more. To sign up, go to [email or web address] . Click on \"Log in\" on the left side of the screen, which will take you to the Welcome page. Then click on \"Sign up Now\" on the right side of the page.     You will be asked to enter the access code listed below, as well as some personal information. Please follow the directions to create your username and password.     Your access code is: [email or web address]  Expires: 2017  7:30 AM     Your access code will  in 90 days. If you need help or a new code, please call your [place] clinic or [phone].        Care EveryWhere ID     " "This is your Care EveryWhere ID. This could be used by other organizations to access your [place] medical records  [id number]        Your Vitals Were     Pulse Height BMI (Body Mass Index)             72 1.88 m (6' 2\") 17.59 kg/m2          Blood Pressure from Last 3 Encounters:   04/20/17 158/76   04/07/17 156/66   03/29/17 156/72    Weight from Last 3 Encounters:   04/20/17 62.1 kg (137 lb)   04/07/17 62.1 kg (137 lb)   03/29/17 62.2 kg (137 lb 3.2 oz)              We Performed the Following     Ashly-Operative Worksheet  (Urology General)     UA without Microscopic     Urine Culture Aerobic Bacterial        Primary Care Provider Office Phone # Fax #    Juani SRIKANTH Choudhary [phone]       [facility] [address] [facility] [address]        Thank you!     Thank you for choosing [facility] [place]  for your care. Our goal is always to provide you with excellent care. Hearing back from our patients is one way we can continue to improve our services. Please take a few minutes to complete the written survey that you may receive in the mail after your visit with us. Thank you!             Your Updated Medication List - Protect others around you: Learn how to safely use, store and throw away your medicines at www.disposemymeds.org.          This list is accurate as of: 4/20/17  6:12 PM.  Always use your most recent med list.                   Brand Name Dispense Instructions for use    ARTIFICIAL TEARS OP      Apply 1 drop to eye as needed         "

## 2017-04-21 DIAGNOSIS — T85.22XD DISLOCATED IOL (INTRAOCULAR LENS), SUBSEQUENT ENCOUNTER: ICD-10-CM

## 2017-04-21 DIAGNOSIS — H40.1133 PRIMARY OPEN ANGLE GLAUCOMA OF BOTH EYES, SEVERE STAGE: ICD-10-CM

## 2017-04-21 LAB
BACTERIA SPEC CULT: NO GROWTH
Lab: NORMAL
MICRO REPORT STATUS: NORMAL
SPECIMEN SOURCE: NORMAL

## 2017-04-21 RX ORDER — NEOMYCIN SULFATE, POLYMYXIN B SULFATE, AND DEXAMETHASONE 3.5; 10000; 1 MG/G; [USP'U]/G; MG/G
1 OINTMENT OPHTHALMIC 4 TIMES DAILY
Qty: 1 TUBE | Refills: 3 | Status: SHIPPED | OUTPATIENT
Start: 2017-04-21 | End: 2017-05-24

## 2017-04-24 ENCOUNTER — TELEPHONE (OUTPATIENT)
Dept: OPHTHALMOLOGY | Facility: CLINIC | Age: 67
End: 2017-04-24

## 2017-04-24 NOTE — TELEPHONE ENCOUNTER
Malka, the patients son called stating that they received an eye drop not a ointment from the pharmacy.  The original order of Maxitrol ointment was denied by the patient's insurance.  Dr. Breaux said the patient could use the Ar/bacitracin/polymyxin ophthalmic ointment in the right eye four times daily.  [facility] said they have that order.  The patient's son will return the eyedrop and  the ointment as prescribed.

## 2017-04-24 NOTE — TELEPHONE ENCOUNTER
----- Message from Fina Presley sent at 4/24/2017 11:23 AM CDT -----  Regarding: Pt's son, Malka, requesting a call from nurse to clarify this Rx...  Contact: [phone]  neomycin-polymyxin-dexamethasone (MAXITROL) 3.5-52423-2.1 OINT ophthalmic ointment.   Malka is questioning whether or not this is the correct Rx for his father.    Please call Malka at ph# [phone].    Thank you,  [place]    Please DO NOT send this message and/or reply back to sender.  Call Center Representatives DO NOT respond to messages.

## 2017-05-08 ENCOUNTER — HOSPITAL ENCOUNTER (OUTPATIENT)
Dept: OCCUPATIONAL THERAPY | Facility: CLINIC | Age: 67
Discharge: HOME | End: 2017-05-08
Attending: OCCUPATIONAL THERAPIST | Admitting: OCCUPATIONAL THERAPIST
Payer: COMMERCIAL

## 2017-05-08 PROCEDURE — 40000249 ZZH STATISTIC VISIT LOW VISION CLINIC: Performed by: OCCUPATIONAL THERAPIST

## 2017-05-08 PROCEDURE — 97166 OT EVAL MOD COMPLEX 45 MIN: CPT | Mod: GO | Performed by: OCCUPATIONAL THERAPIST

## 2017-05-08 PROCEDURE — 97535 SELF CARE MNGMENT TRAINING: CPT | Mod: GO | Performed by: OCCUPATIONAL THERAPIST

## 2017-05-08 ASSESSMENT — VISUAL ACUITY
OS: NLP
OU: 20/200
OD: 20/200

## 2017-05-08 ASSESSMENT — ACTIVITIES OF DAILY LIVING (ADL): PRIOR_ADL/IADL_STATUS: IMPAIRED PRIOR TO ONSET

## 2017-05-08 NOTE — PROGRESS NOTES
05/08/17 1300   Visit Type   Type of Visit Initial       Present Yes   General Information   Start Of Care Date 05/08/17   Referring Physician page MD   Orders Evaluate And Treat As Indicated   Date of Order 03/30/17   Medical Diagnosis Glaucoma   Onset Of Illness/injury Or Date Of Surgery 03/30/2017   Surgical/Medical history reviewed (glaucoma - sudden vision loss 4 years ago)   Precautions/Limitations constricted visual fields - requires assist for navigation   Additional Occupational Profile Info/Pertinent History of Current Problem Singaporean speaking, hard of hearing, dependent on children at this time.  4 year history of vision loss; no low vision assist to date   Prior ADL/IADL status Impaired prior to onset   Others present at visit Child (charles)  (Malka)   Patient/family Goals Statement reading, glare managment, simple food prep, increased safety in navigation, cell phone communications   Social History/Home Environment   Living Environment Apartment/condo  (getting assessed for PCA)   Current Community Support (assessment for PCA pending)   Patient Role/employment History  Disabled   Avocational reading, writing,   Pain Assessment   Pain Reported Yes   Pain Location headache   Pain Scale 5/10   Fall Risk Screen   Fall screen completed by OT   Have you fallen 2 or more times in the last year? No   Have you fallen and had an injury in the past year? No   Is the patient a fall risk? Yes   Comments walks with asssist only   Cognitive/Behavioral   Communication Intact  ( assisted - hard of hearing)   Cognitive Status Intact for evaluation process   Behavior Appropriate   Patient/family aware of diagnosis Yes   How well do you understand your eye condition? Not well   Vision related restrictions on visiting with family/friends Moderate   Reported emotional impact of visual impairment Moderate   Adjustment to disability Fair   Cognitive/Behavioral Comment has had no opportunity for  visual rehabilitatiion in the past   Physical Status/Equipment   Physical Status No problems observed/reported   Mobility equipment used Support cane   Visual Report   Functional Complaints Reading;Writing;Homemaking;Safety in mobility  (glare management)   Visual Complaints Constricted visual fields right eye;Constricted visual fields left eye;Visual fatigue;Light sensitivity   Irwin Bonnet Symptoms? Yes   Magnifier (strength and type) none   Technology ( cell phone, ipad)   Equipment comments family has an ipad. Pt has not used.   Lighting and Glare   Is your lighting adequate? Yes/ at home   Is glare a problem? Yes/ outdoors  (reports glare is blinding)   Are you satisfied with your sunglasses? (does not have)   Visual Acuity   Acuity right eye 20/200   Acuity left eye NLP   Acuity both eyes together 20/200   Contrast Sensitivity   Contrast sensitivity (score/25) 4/25   [place] Read   Smallest print size read 4.0M   Critical print size 8.0M   Words per minute at critical print size letter identification only - non-English speaking   Dynavision: Evaluation of visual skills/search of extra personal space via 5X4 foot computerized light board with 64 stimuli.  The user reacts as quickly as possible by striking the lights as they turn on in random succession.   Dynavision Cascade Dynavision Mode A (single stimulus attention, 1 minute   Dynavision Mode A (single stimulus attention, 1 minute)   Patient Score (Mode A, 1 min) 6   Education   Learner Patient;Family  (carson Ace)   Readiness Eager;Acceptance   Method Explanation;Demonstration   Response Verbalizes understanding;Demonstrates understanding;Needs reinforcement   Clinical Impression, OT Eval   Criteria for Skilled Therapeutic Interventions Met yes;treatment indicated   Therapy  Diagnosis: Impaired ADL/IADL with deficits in Reading based ADL;Written communication;Home management;Dressing;Eating  (glare management, safety in mobility)   Assessment of Occupational  Performance 5 or more Performance Deficits   Identified Performance Deficits as above   Clinical Decision Making (Complexity) Moderate complexity   Clinical Impression Comments pt hard of hearing and non-English speaking required repetition and clarification thoughout   OT Visual Rehabilitation Evaluation Plan   Therapy Plan Occupational therapy intervention   Planned Interventions Visual field loss awareness;Visual skills training for near tasks;Visual skills training for safety in mobility;Visual skills training for location of objects;Low vision compensatory training for reading;Low vision compensatory training for written communication;Low vision compensatory trainging for financial management;Low vision compensatory training for object identification;Instruction in environmental adaptations for glare;Instruction in environmental adaptations for contrast;Instruction in environmental adaptations for lighting;Optical device/ADL device instruction and training;Computer modifications;Instruction in community resources   Frequency / Duration 5 tx visits over 12 weeks - 1X every 2-3 weeks for 12 weeks   Risks and Benefits of Treatment have been explained. Yes   Patient, Family in agreement with plan of care Yes   GOALS   Goals Near Vision;Visual Field;Environmental Modification;Resource Education;Distance Viewing   Goals Addressed this Session Near vision;Environmental modification   Goal 1 - Near Vision   Goal Description: Near Vision Patient will verbalize awareness of visual field Loss and demonstrate improved use of visual skills/adaptive equipment for increased independence in reading-based activities of daily living, written communication and detail ADL tasks.   Target Date 07/31/17   Goal 2 - Visual field   Goal Description: Visual field Patient will verbalize awareness of visual field loss and demonstrate improved use of visual skills for increased safety/ independence in locating objects/obstacles and in  navigation   Target Date 07/31/17   Goal 3 - Environmental modification   Goal Description: Environment modification Patient will demonstrate understanding of the impact of lighting, contrast and glare on ADL activities, in conjunction with environmentally-based ADL modifications   Target Date 07/31/17   Goal 4 - Resource education   Goal Description: Resource education Patient will verbalize awareness of community resources for the following:;Audio access to print materials;Access to large print materials;Access to low vision devices   Target Date 07/31/17   Goal 5 - Distance viewing   Goal Description: Distance viewing Patient will demonstrate proficient use of a distance device in conjunction with appropriate visual skills techniques to correctly survey objects in the distance   Target Date 07/31/17   Total Evaluation Time   Total Evaluation Time 45   Therapy Certification   Certification date from 05/08/17   Certification date to 07/31/17   Medical Diagnosis glaucoma, constricted visual fields

## 2017-05-08 NOTE — MR AVS SNAPSHOT
After Visit Summary   5/8/2017    Shayne Fiore    MRN: [id number]           Patient Information     Date Of Birth          1950        Visit Information        Provider Department      5/8/2017 12:45 PM Elly Meadows OT; [facility], [place], Occupational Therapy, Vision - Outpatie         Follow-ups after your visit        Your next 10 appointments already scheduled     May 09, 2017  8:15 AM CDT   Post-Op with Isai Varma MD   Eye Clinic ([facility])    [address]  [address] [place] [address]   [phone]            May 25, 2017  2:30 PM CDT   Treatment 60 with Elly Meadows OT   [facility], Liz, Occupational Therapy, Vision - Outpatie ([facility])    [address] [place] [address], [address] [address]   [phone]            May 30, 2017  9:45 AM CDT   RETURN CORNEA with Isai Varma MD   Eye Clinic ([facility])    [address]  [address] [address]   [phone]            May 31, 2017  9:30 AM CDT   Treatment 60 with Elly Meadows OT   [facility], Liz, Occupational Therapy, Vision - Outpatie ([facility])    [address] [place] [address], [address] [address]   [phone]            Jul 13, 2017   Procedure with Bruno Vazquez MD   [facility] PeriOP Services (--)    [address]  [facility] [address]   [phone]            Jul 25, 2017  1:20 PM CDT   Return Visit with Dolly Granda MD   [facility] ([facility])    [address]  [address]  [facility] [address]   [phone]            Aug 01, 2017  1:00 PM CDT   Programmable Hearing Aid Check with Scarlett Sanchez [facility] Audiology ([facility])    [address] [facility]  4th [facility] [address] [phone]              Who to contact     If you have questions or need follow up information about today's clinic visit or your schedule please contact [facility], VISION - OUTPATIE directly at [phone].  Normal or non-critical lab and imaging results will be communicated to you by MyChart, letter or phone within 4 business days after the clinic has received the results. If you do not hear from us within 7 days, please contact the clinic through MyChart or phone. If you have a critical or abnormal lab result, we will notify you by phone as soon as possible.  Submit refill requests through [employer] or call your pharmacy and they will forward the refill request to us. Please allow 3 business days for your refill to be completed.          Additional Information About Your Visit        [employer] Information     [employer] lets you send messages to your doctor, view your test results, renew your prescriptions, schedule appointments and more. To sign up, go to [email or web address] . Click on \"Log in\" on the left side of the screen, which will take you to the Welcome page. Then click on \"Sign up Now\" on the right side of the page.     You will be asked to enter the access code listed below, as well as some personal information. Please follow the directions to create your username and password.     Your access code is: [email or web address]  Expires: 2017  7:30 AM     Your access code will  in 90 days. If you need help or a new code, please call your [place] clinic or [phone].        Care EveryWhere ID     This is your Care EveryWhere ID. This could be used by other organizations to access your [place] medical records  [id number]         Blood Pressure from Last 3 Encounters:   17 158/76   17 156/66   17 156/72    Weight from Last 3 Encounters:   17 62.1 kg (137 lb) "   04/07/17 62.1 kg (137 lb)   03/29/17 62.2 kg (137 lb 3.2 oz)              Today, you had the following     No orders found for display       Primary Care Provider Office Phone # Fax #    Juani ChoudharySRIKANTH [phone]       [facility] [address] [facility] [address]        Thank you!     Thank you for choosing [facility], [place], OCCUPATIONAL THERAPY, VISION - OUTPATIE  for your care. Our goal is always to provide you with excellent care. Hearing back from our patients is one way we can continue to improve our services. Please take a few minutes to complete the written survey that you may receive in the mail after your visit with us. Thank you!             Your Updated Medication List - Protect others around you: Learn how to safely use, store and throw away your medicines at www.disposemymeds.org.      ASK your doctor about these medications        Brand Name Dispense Instructions for use    ARTIFICIAL TEARS OP      Apply 1 drop to eye as needed       neomycin-polymyxin-dexamethasone 3.5-87307-0.1 Oint ophthalmic ointment    MAXITROL    1 Tube    Place 1 Application into the right eye 4 times daily

## 2017-05-09 ENCOUNTER — OFFICE VISIT (OUTPATIENT)
Dept: OPHTHALMOLOGY | Facility: CLINIC | Age: 67
End: 2017-05-09
Attending: OPHTHALMOLOGY
Payer: COMMERCIAL

## 2017-05-09 DIAGNOSIS — Z48.810 AFTERCARE FOLLOWING SURGERY OF A SENSORY ORGAN: Primary | ICD-10-CM

## 2017-05-09 DIAGNOSIS — H04.121 DRY EYE SYNDROME OF RIGHT LACRIMAL GLAND: ICD-10-CM

## 2017-05-09 DIAGNOSIS — H40.10X0 OPEN-ANGLE GLAUCOMA OF LEFT EYE, UNSPECIFIED GLAUCOMA STAGE, UNSPECIFIED OPEN-ANGLE GLAUCOMA TYPE: ICD-10-CM

## 2017-05-09 PROCEDURE — 68761 CLOSE TEAR DUCT OPENING: CPT | Mod: ZF | Performed by: OPHTHALMOLOGY

## 2017-05-09 PROCEDURE — 99213 OFFICE O/P EST LOW 20 MIN: CPT | Mod: ZF

## 2017-05-09 RX ORDER — DORZOLAMIDE HYDROCHLORIDE AND TIMOLOL MALEATE 20; 5 MG/ML; MG/ML
1 SOLUTION/ DROPS OPHTHALMIC 2 TIMES DAILY
Qty: 1 BOTTLE | Refills: 11 | Status: SHIPPED | OUTPATIENT
Start: 2017-05-09 | End: 2017-10-17

## 2017-05-09 ASSESSMENT — SLIT LAMP EXAM - LIDS: COMMENTS: NORMAL

## 2017-05-09 ASSESSMENT — TONOMETRY
OS_IOP_MMHG: 64
OD_IOP_MMHG: 10
IOP_METHOD: ICARE

## 2017-05-09 ASSESSMENT — VISUAL ACUITY
OD_CC: 20/500
METHOD: SNELLEN - LINEAR
OD_PH_CC: 20/200
OS_CC: NLP
CORRECTION_TYPE: GLASSES

## 2017-05-09 NOTE — NURSING NOTE
Chief Complaints and History of Present Illnesses   Patient presents with     Follow Up For     Recheck on red, irritated eye. IOL explantation/ant vit/ACIOL right eye (2/15/17).     HPI    Affected eye(s):  Right   Symptoms:     No blurred vision   No decreased vision         Do you have eye pain now?:  Yes   Location:  OD, OS   Pain Level:  Moderate Pain (5)   Pain Frequency:  Constant   Pain Characteristics:  Burning      Comments:  Pt states he was really sick yesterday with a lot of pain. Pain over all is better than it was at the last visit but pain level also varies.   Pt states pain is constant in the LE but pain comes and goes in the RE.  Flora Mccauley COA 8:45 AM May 9, 2017

## 2017-05-09 NOTE — PROGRESS NOTES
CC: Red and painful right eye    Right eye pain, itching, and irritation since surgery; gets relief with ointment  Saw Low Vision yesterday    A/P:    1. POM #3 IOL explantation/ant vit/ACIOL right eye (2/15/17)    Dry and irregular surface with chronic discomfort    Vision limited by advanced glaucoma    rec UL / LL plugs today (collagen)    sut removal    add cosopt twice a day left eye     Cont maxitrol twice a day ; lubricating ointment and preservative free artificial tears throughout the day     ~~~~~~~~~~~~~~~~~~~~~~~~~~~~~~~~~~~~~~~~~~~~~~~~~~~~~~~~~~~~~~~~    Complete documentation of historical and exam elements from today's encounter can be found in the full encounter summary report (not reduplicated in this progress note). I personally obtained the chief complaint(s) and history of present illness.  I confirmed and edited as necessary the review of systems, past medical/surgical history, family history, social history, and examination findings as documented by others.  I examined the patient myself, and I personally reviewed the relevant tests, images, and reports as documented above. I formulated and edited as necessary the assessment and plan and discussed the findings and management plan with the patient and family.     I was personally present for the entirety of the in-office procedure.     Isai Varma MD, MA  Director, Cornea & Anterior Segment  [facility] Department of Ophthalmology & Visual Neuroscience

## 2017-05-09 NOTE — MR AVS SNAPSHOT
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)    [address] [place] [address], [facility] [address]  [facility] [address]   [phone]            May 31, 2017  9:30 AM CDT   Treatment 60 with Elly Meadows OT   [facility], Liz, Occupational Therapy, Vision - Outpatie ([facility])    [address], Clinic [address]  [facility] [address]   [phone]            Jun 20, 2017  9:45 AM CDT   RETURN CORNEA with Isai Varma MD   Eye Clinic ([facility])    [address]  [address] Clin [address]  [facility] [address]   [phone]            Jul 13, 2017   Procedure with Bruno Vazquez MD   [facility] PeriOP Services (--)    [address]  [facility] [address] [phone]            Jul 25, 2017  1:20 PM CDT   Return Visit with Dolly Granda MD   [facility] ([facility])    [address]  [facility] [address] [phone]            Aug 01, 2017  1:00 PM CDT   Programmable Hearing Aid Check with Scarlett Sanchez [facility] Audiology ([facility])    [address] [address] [phone]              Who to contact     Please call your clinic at [phone] to:    Ask questions about your health    Make or cancel appointments    Discuss your medicines    Learn about your test results    Speak to your doctor   If you have compliments or concerns about an experience at your clinic, or if you wish to file a complaint, please contact [facility] Patient Relations at [phone] or email us at [email or web address]         Additional Information About Your Visit        [employer] Information     [employer] is an electronic gateway that provides easy, online access to your medical records. With [employer], you can request a clinic appointment, read your test results, renew a prescription or communicate with your care team.     To sign up for [employer] visit the website at [email or web address]   You will be asked to enter the access code listed below, as well as some personal information. Please follow the directions to create your username and password.     Your access code is: [email or web address]  Expires: 2017  7:30 AM     Your access code will  in 90 days. If you need help or a new code, please contact your [facility] or call [phone] for assistance.        Care EveryWhere ID     This is your Care EveryWhere ID. This could be used by other organizations to access your [place] medical records  [id number]         Blood Pressure from Last 3 Encounters:   17 158/76   17 156/66   17 156/72    Weight from Last 3 Encounters:   17 62.1 kg (137 lb)   17 62.1 kg (137 lb)   17 62.2 kg (137 lb 3.2 oz)              We Performed the Following     Punctal Closure, Plugs          Today's Medication Changes          These changes are accurate as of: 17  9:57 AM.  If you have any questions, ask your nurse or doctor.                Start taking these medicines.        Dose/Directions    dorzolamide-timolol 2-0.5 % ophthalmic solution   Commonly known as:  COSOPT   Used for:  Open-angle glaucoma of left eye, unspecified glaucoma stage, unspecified open-angle glaucoma type   Started by:  Isai Varma MD        Dose:  1 drop   Place 1 drop Into the left eye 2 times daily   Quantity:  1 Bottle   Refills:  11            Where to get your medicines      These medications were sent to [id number] - [place]], [place] - [address]  [address], [facility] [address]     Phone:  [phone]     dorzolamide-timolol 2-0.5 % ophthalmic solution                Primary Care Provider Office Phone # Fax #    Juani ChoudharySRIKANTH [phone]       [facility] [address] [facility] [address]        Thank you!     Thank you for choosing EYE CLINIC  for your care. Our goal is always to provide you with excellent care. Hearing back from our patients is one way we can continue to improve our services. Please take a few minutes to complete the written survey that you may receive in the mail after your visit with us. Thank you!             Your Updated Medication List - Protect others around you: Learn how to safely use, store and throw away your medicines at www.disposemymeds.org.          This list is accurate as of: 5/9/17  9:57 AM.  Always use your most recent med list.                   Brand Name Dispense Instructions for use    ARTIFICIAL TEARS OP      Apply 1 drop to eye as needed       dorzolamide-timolol 2-0.5 % ophthalmic solution    COSOPT    1 Bottle    Place 1 drop Into the left eye 2 times daily       neomycin-polymyxin-dexamethasone 3.5-52529-7.1 Oint ophthalmic ointment    MAXITROL    1 Tube    Place 1 Application into the right eye 4 times daily

## 2017-05-10 ENCOUNTER — TELEPHONE (OUTPATIENT)
Dept: OPHTHALMOLOGY | Facility: CLINIC | Age: 67
End: 2017-05-10

## 2017-05-10 NOTE — TELEPHONE ENCOUNTER
----- Message from Kristikenneth Lake sent at 5/9/2017  1:14 PM CDT -----  Regarding: PT'S SON WANTS TO KNOW IF PT SHOULD CONTINUE EYE DROPS NOW THAT STITCHES ARE REMOVED  Contact: [phone]  Pt's son Jasper regarding eye drops that Pt was using before eye surgery and wants to know if Pt should continue using now that stitches have been taken out?     Please call Pt's son Jasper back to confirm at [phone].    Thank you,    Kristi  Call Center    Please DO NOT send message and or reply back to sender. Call center Representatives DO NOT respond to Messages.

## 2017-05-10 NOTE — TELEPHONE ENCOUNTER
Malka, the patient's son had a questions about a previous drop for his Dad to use after suture removal.  I verified with the resident that Dr. Varma switched the patient to Maxitrol ointment.  Malka understood the drops and directions.

## 2017-05-23 ENCOUNTER — TELEPHONE (OUTPATIENT)
Dept: OPHTHALMOLOGY | Facility: CLINIC | Age: 67
End: 2017-05-23

## 2017-05-23 NOTE — TELEPHONE ENCOUNTER
Yesterday right eye pain with some swelling  Mild redness  Today pain better   Vision about the same  No photophobia  Scheduled appt tomorrow with dr. bonilla  Pt/son seemed satisfied with date/time  Nando Blue RN 12:11 PM 05/23/17

## 2017-05-24 ENCOUNTER — OFFICE VISIT (OUTPATIENT)
Dept: OPHTHALMOLOGY | Facility: CLINIC | Age: 67
End: 2017-05-24
Attending: OPHTHALMOLOGY
Payer: COMMERCIAL

## 2017-05-24 DIAGNOSIS — H04.121 CHRONIC DRYNESS OF RIGHT EYE: Primary | ICD-10-CM

## 2017-05-24 PROCEDURE — 99213 OFFICE O/P EST LOW 20 MIN: CPT | Mod: ZF

## 2017-05-24 ASSESSMENT — EXTERNAL EXAM - LEFT EYE: OS_EXAM: NORMAL

## 2017-05-24 ASSESSMENT — SLIT LAMP EXAM - LIDS: COMMENTS: NORMAL

## 2017-05-24 ASSESSMENT — TONOMETRY
OS_IOP_MMHG: 42
OD_IOP_MMHG: 12
IOP_METHOD: TONOPEN

## 2017-05-24 ASSESSMENT — VISUAL ACUITY
OD_SC: 20/200
METHOD: SNELLEN - LINEAR
OS_SC: NLP

## 2017-05-24 ASSESSMENT — CONF VISUAL FIELD
OD_INFERIOR_TEMPORAL_RESTRICTION: 3
OS_INFERIOR_NASAL_RESTRICTION: 1
OD_SUPERIOR_NASAL_RESTRICTION: 3
OS_SUPERIOR_TEMPORAL_RESTRICTION: 1
OS_INFERIOR_TEMPORAL_RESTRICTION: 1
OD_SUPERIOR_TEMPORAL_RESTRICTION: 3
OS_SUPERIOR_NASAL_RESTRICTION: 1
OD_INFERIOR_NASAL_RESTRICTION: 3

## 2017-05-24 ASSESSMENT — EXTERNAL EXAM - RIGHT EYE: OD_EXAM: NORMAL

## 2017-05-24 NOTE — PROGRESS NOTES
CC: Red and painful right eye    Mr. Fiore is a 67 year old male s/p IOL explantation, Avit/ACIOL right eye 2/15/17. He has been having 4 days of intermittent pain in the right eye, worse than his previous chronic pain and dryness. He thinks the vision is about the same. There is redness but no discharge. No flashes/floaters.    Eye medications:  ATs frequent OD   He stopped the maxitrol ointment  Cosopt OS BID    A/P:    1. POM #3 IOL explantation/ant vit/ACIOL right eye (2/15/17)    Dry and irregular surface with chronic discomfort    Vision limited by advanced glaucoma    Add lubricating ointment BID OD  Continue frequent ATs OD  Continue Cosopt OS BID    Return for already scheduled visit with Dr. Varma 6/20/17.    Teaching statement:  Complete documentation of historical and exam elements from today's encounter can be found in the full encounter summary report (not reduplicated in this progress note). I personally obtained the chief complaint(s) and history of present illness.  I confirmed and edited as necessary the review of systems, past medical/surgical history, family history, social history, and examination findings as documented by others; and I examined the patient myself. I personally reviewed the relevant tests, images, and reports as documented above.     I formulated and edited as necessary the assessment and plan and discussed the findings and management plan with the patient and family.    Alva Calvin MD  Comprehensive Ophthalmology & Ocular Pathology  Department of Ophthalmology and Visual Neurosciences  [email or web address]  Pager [phone]

## 2017-05-24 NOTE — NURSING NOTE
Chief Complaints and History of Present Illnesses   Patient presents with     Eye Problem Right Eye     eye pain     Post Op (Ophthalmology) Right Eye     POM#3 s/p IOL explantation/ant vit/ACIOL right eye (2/15/17     HPI    Affected eye(s):  Right   Symptoms:     Blurred vision   Redness   Tearing   Itching   Burning         Do you have eye pain now?:  Yes   Location:  OD   Pain Level:  Moderate Pain (5)   Pain Duration:  4 days   Pain Frequency:  Intermittent   Pain Characteristics:  Burning   Other:  pressure; pain with eye movement per pt      Comments:  rec UL / LL plugs 05/09/17  cosopt twice a day left eye   DIAMOND Joe May 24, 2017 8:22 AM

## 2017-05-24 NOTE — MR AVS SNAPSHOT
After Visit Summary   5/24/2017    Shayne Fiore    MRN: [id number]           Patient Information     Date Of Birth          1950        Visit Information        Provider Department      5/24/2017 7:45 AM Alva Calvin MD; [facility]        Today's Diagnoses     Chronic dryness of right eye    -  1       Follow-ups after your visit        Follow-up notes from your care team     Return for already scheduled visit with Dr. Varma 6/20/17.      Your next 10 appointments already scheduled     May 25, 2017  2:30 PM CDT   Treatment 60 with Elly Meadows, OT   [facility] [place], Occupational Therapy, Vision - Outpatie ([facility])    [address] [facility] [address]  [facility] [facility] [address]   [phone]            May 31, 2017  9:30 AM CDT   Treatment 60 with Elly Meadows, OT   [facility] [place], Occupational Therapy, Vision - Outpatie ([facility])    [address] [facility] [address]  [facility] [facility] [address]   [phone]            Jun 20, 2017  9:45 AM CDT   RETURN CORNEA with Isai Varma MD   Eye Clinic ([facility])    [address]  [address] [facility]  [address] [address]  [facility] [facility] [address]   [phone]            Jul 13, 2017   Procedure with Bruno Vazquez MD   [facility] PeriOP Services (--)    [address]  [place] [address] [phone]            Jul 25, 2017  1:20 PM CDT   Return Visit with Dolly Granda MD   [facility] ([facility])    [address]  [address]  [place] [address] [phone]            Aug 01, 2017  1:00 PM CDT   Programmable Hearing Aid Check with Scarlett Sanchez [facility] ([facility])    [address]  [address] [facility]  [id number] [phone]              Who to contact     Please call your clinic at [phone] to:    Ask questions about your health    Make or cancel appointments    Discuss your medicines    Learn about your test results    Speak to your doctor   If you have compliments or concerns about an experience at your clinic, or if you wish to file a complaint, please contact [facility] Patient Relations at [phone] or email us at [email or web address]         Additional Information About Your Visit        [employer] Information     [employer] is an electronic gateway that provides easy, online access to your medical records. With [employer], you can request a clinic appointment, read your test results, renew a prescription or communicate with your care team.     To sign up for [employer] visit the website at [email or web address]   You will be asked to enter the access code listed below, as well as some personal information. Please follow the directions to create your username and password.     Your access code is: [email or web address]  Expires: 2017  7:30 AM     Your access code will  in 90 days. If you need help or a new code, please contact your [facility] or call [phone] for assistance.        Care EveryWhere ID     This is your Care EveryWhere ID. This could be used by other organizations to access your [place] medical records  [id number]         Blood Pressure from Last 3 Encounters:   17 158/76   17 156/66   17 156/72    Weight from Last 3 Encounters:   17 62.1 kg (137 lb)   17 62.1 kg (137 lb)   17 62.2 kg (137 lb 3.2 oz)              Today, you had the following     No orders found for display         Today's Medication Changes          These changes are accurate as of: 17  9:02 AM.  If you have any questions, ask your nurse or doctor.               Start taking these medicines.         Dose/Directions    artificial tears Oint ophthalmic ointment   Used for:  Chronic dryness of right eye   Started by:  Alva Calvin MD        Dose:  1 inch   Place 1 g into the right eye At Bedtime Every night and during the day as needed   Quantity:  1 Tube   Refills:  11            Where to get your medicines      Some of these will need a paper prescription and others can be bought over the counter.  Ask your nurse if you have questions.     Bring a paper prescription for each of these medications     artificial tears Oint ophthalmic ointment                Primary Care Provider Office Phone # Fax #    Juani ChoudharySRIKANTH [phone]       [facility] [address] [facility] [address]        Thank you!     Thank you for choosing EYE CLINIC  for your care. Our goal is always to provide you with excellent care. Hearing back from our patients is one way we can continue to improve our services. Please take a few minutes to complete the written survey that you may receive in the mail after your visit with us. Thank you!             Your Updated Medication List - Protect others around you: Learn how to safely use, store and throw away your medicines at www.disposemymeds.org.          This list is accurate as of: 5/24/17  9:02 AM.  Always use your most recent med list.                   Brand Name Dispense Instructions for use    artificial tears Oint ophthalmic ointment     1 Tube    Place 1 g into the right eye At Bedtime Every night and during the day as needed       ARTIFICIAL TEARS OP      Apply 1 drop to eye as needed       dorzolamide-timolol 2-0.5 % ophthalmic solution    COSOPT    1 Bottle    Place 1 drop Into the left eye 2 times daily

## 2017-05-25 ENCOUNTER — HOSPITAL ENCOUNTER (OUTPATIENT)
Dept: OCCUPATIONAL THERAPY | Facility: CLINIC | Age: 67
Discharge: HOME | End: 2017-05-25
Attending: OCCUPATIONAL THERAPIST | Admitting: OCCUPATIONAL THERAPIST
Payer: COMMERCIAL

## 2017-05-25 PROCEDURE — 97535 SELF CARE MNGMENT TRAINING: CPT | Mod: GO | Performed by: OCCUPATIONAL THERAPIST

## 2017-05-25 PROCEDURE — 40000249 ZZH STATISTIC VISIT LOW VISION CLINIC: Performed by: OCCUPATIONAL THERAPIST

## 2017-05-25 PROCEDURE — T1013 SIGN LANG/ORAL INTERPRETER: HCPCS | Mod: U3

## 2017-05-31 ENCOUNTER — HOSPITAL ENCOUNTER (OUTPATIENT)
Dept: OCCUPATIONAL THERAPY | Facility: CLINIC | Age: 67
Discharge: HOME | End: 2017-05-31
Attending: OCCUPATIONAL THERAPIST | Admitting: OCCUPATIONAL THERAPIST
Payer: COMMERCIAL

## 2017-05-31 PROCEDURE — 40000249 ZZH STATISTIC VISIT LOW VISION CLINIC: Performed by: OCCUPATIONAL THERAPIST

## 2017-05-31 PROCEDURE — 97535 SELF CARE MNGMENT TRAINING: CPT | Mod: GO | Performed by: OCCUPATIONAL THERAPIST

## 2017-06-12 ENCOUNTER — HOSPITAL ENCOUNTER (OUTPATIENT)
Dept: OCCUPATIONAL THERAPY | Facility: CLINIC | Age: 67
Discharge: HOME | End: 2017-06-12
Attending: OCCUPATIONAL THERAPIST | Admitting: OCCUPATIONAL THERAPIST
Payer: COMMERCIAL

## 2017-06-12 PROCEDURE — T1013 SIGN LANG/ORAL INTERPRETER: HCPCS | Mod: U3

## 2017-06-12 PROCEDURE — 97535 SELF CARE MNGMENT TRAINING: CPT | Mod: GO | Performed by: OCCUPATIONAL THERAPIST

## 2017-06-12 PROCEDURE — 40000249 ZZH STATISTIC VISIT LOW VISION CLINIC: Performed by: OCCUPATIONAL THERAPIST

## 2017-06-13 NOTE — ADDENDUM NOTE
Encounter addended by: Elly Meadows OT on: 6/13/2017  5:28 PM<BR>     Actions taken: Episode edited, Charge Capture section accepted, Flowsheet data copied forward, Flowsheet accepted

## 2017-06-14 NOTE — ADDENDUM NOTE
Encounter addended by: Elly Meadows OT on: 6/14/2017  4:39 PM<BR>     Actions taken: Flowsheet data copied forward, Sign clinical note, Flowsheet accepted, Episode resolved

## 2017-06-14 NOTE — PROGRESS NOTES
06/13/17 1700   Signing Clinician's Name / Credentials   Signing clinician's name / credentials Elly Meadows OTR/PANTERA   Session Number   Session Number 4   Recertification   Recertification Due 07/31/17   GOALS   Goals Near Vision;Visual Field;Environmental Modification;Resource Education;Distance Viewing   Goals Addressed this Session Near vision;Environmental modification   Goal 1 - Near Vision   Goal Description: Near Vision Patient will verbalize awareness of visual field Loss and demonstrate improved use of visual skills/adaptive equipment for increased independence in reading-based activities of daily living, written communication and detail ADL tasks.   Near Vision Goal Comment goal met with identification of electronic devices for ind. continuous text reading, application completed for audio books per SSB,    Target Date 07/31/17   Date Met 06/12/17   Goal 2 - Visual field   Goal Description: Visual field Patient will verbalize awareness of visual field loss and demonstrate improved use of visual skills for increased safety/ independence in locating objects/obstacles and in navigation   Visual Field Goal Comment goal met with introduction to sighted guide technique for family to use when leading patient and with instruction in methods to compensate for field loss when cleaning tables, looking for objects   Target Date 07/31/17   Date Met 06/12/17   Goal 3 - Environmental modification   Goal Description: Environment modification Patient will demonstrate understanding of the impact of lighting, contrast and glare on ADL activities, in conjunction with environmentally-based ADL modifications   Environmental Modification Goal Comment goal met with identification of light efrain (53%) for managing indoor glare, 16% for managing outdoor glare, use of alexis contrast backrgrounds for location of objects and organizational methods for same purpose. Identified cutting glove for safety in cutting, and ove glove for  "safety handling hot pans.  Identified liquid level indicator for pouring independence   Target Date 07/31/17   Date Met 06/12/17   Goal 4 - Resource education   Goal Description: Resource education Patient will verbalize awareness of community resources for the following:;Audio access to print materials;Access to large print materials;Access to low vision devices   Resource Education Goal Comment goal met as written   Target Date 07/31/17   Date Met 06/12/17   Goal 5 - Distance viewing   Goal Description: Distance viewing Patient will demonstrate proficient use of a distance device in conjunction with appropriate visual skills techniques to correctly survey objects in the distance   Distance Viewing Goal Comment goal not addressed.   Target Date 07/31/17       Present Yes   Language Tristanian   Therapy Diagnosis   Therapy  Diagnosis: Impaired ADL/IADL with deficits in Reading based ADL;Written communication;Home management;Dressing;Eating  (glare management, safety in mobility)   Subjective Report   Subjective Report \"I feel that we have addressed all of my goals\"   Visual Rehab Treatment [place]   Daily Treatment [place] Self Care/Home Management   Self Care/Home Management   Minutes 45   Skilled Intervention Environmental modification, marking appliance dials;Environmental modification, providing visual contrast to obstacles;Organizational strategies incorporating contrast  (making tea, pouring, safety in food prep)   Patient Response demonstrated fair to good understanding. Pt's sons with good understandng   Treatment Detail Instructed in adapted methods for cooking, making tea, setting dials. Issued hi-marks for marking dials in home. Identified solutions as noted above for cutting, cooking, pouring. Provided instruction in audio book player via [employer]. Pt requested application be mailed for this service, and this was completed per this therapist   Progress goals met. Goal 5 discharged   MN Read "   Smallest print size read all with CCTV   Equipment/Resources   Written resources provided Magnifier;Tinted glasses;Audio access to print material;Low vision devices;Registered for [employer] books on tape/radio talking book program   Education   Learner Patient;Family   Readiness Acceptance   Method Booklet/handout;Explanation;Demonstration   Response Verbalizes understanding;Demonstrates understanding;Needs reinforcement   Education Notes sons's available to provide reinforcement as needed   Communication with other professionals   Communication with other professionals per EHR   Plan   Plan for next session discharge   Total Session Time   Total Session Time 45

## 2017-06-20 ENCOUNTER — OFFICE VISIT (OUTPATIENT)
Dept: OPHTHALMOLOGY | Facility: CLINIC | Age: 67
End: 2017-06-20
Attending: OPHTHALMOLOGY
Payer: COMMERCIAL

## 2017-06-20 DIAGNOSIS — H16.9 KERATITIS: Primary | ICD-10-CM

## 2017-06-20 DIAGNOSIS — H04.121 DRY EYE SYNDROME OF RIGHT LACRIMAL GLAND: ICD-10-CM

## 2017-06-20 DIAGNOSIS — H40.10X0 OPEN-ANGLE GLAUCOMA OF LEFT EYE, UNSPECIFIED GLAUCOMA STAGE, UNSPECIFIED OPEN-ANGLE GLAUCOMA TYPE: ICD-10-CM

## 2017-06-20 DIAGNOSIS — H54.40 BLIND ONE EYE: ICD-10-CM

## 2017-06-20 PROCEDURE — 99212 OFFICE O/P EST SF 10 MIN: CPT | Mod: ZF

## 2017-06-20 RX ORDER — NEOMYCIN SULFATE, POLYMYXIN B SULFATE, AND DEXAMETHASONE 3.5; 10000; 1 MG/G; [USP'U]/G; MG/G
1 OINTMENT OPHTHALMIC 2 TIMES DAILY
Qty: 1 TUBE | Refills: 11 | Status: ON HOLD | OUTPATIENT
Start: 2017-06-20 | End: 2017-07-13

## 2017-06-20 ASSESSMENT — CONF VISUAL FIELD
OD_INFERIOR_NASAL_RESTRICTION: 3
OD_INFERIOR_TEMPORAL_RESTRICTION: 3
OS_INFERIOR_TEMPORAL_RESTRICTION: 1
OS_SUPERIOR_NASAL_RESTRICTION: 1
OD_SUPERIOR_NASAL_RESTRICTION: 3
OS_INFERIOR_NASAL_RESTRICTION: 1
OS_SUPERIOR_TEMPORAL_RESTRICTION: 1
OD_SUPERIOR_TEMPORAL_RESTRICTION: 3

## 2017-06-20 ASSESSMENT — VISUAL ACUITY
METHOD: SNELLEN - LINEAR
OD_SC: 20/300
OS_SC: NLP

## 2017-06-20 ASSESSMENT — EXTERNAL EXAM - LEFT EYE: OS_EXAM: NORMAL

## 2017-06-20 ASSESSMENT — SLIT LAMP EXAM - LIDS: COMMENTS: NORMAL

## 2017-06-20 ASSESSMENT — TONOMETRY
IOP_METHOD: ICARE
OD_IOP_MMHG: 09
OS_IOP_MMHG: 41

## 2017-06-20 ASSESSMENT — EXTERNAL EXAM - RIGHT EYE: OD_EXAM: NORMAL

## 2017-06-20 NOTE — MR AVS SNAPSHOT
After Visit Summary   6/20/2017    Shayne Fiore    MRN: [id number]           Patient Information     Date Of Birth          1950        Visit Information        Provider Department      6/20/2017 9:30 AM Isai Varma MD; [facility]        Care Instructions        For the right eye:      Restart neomycin / polymyxin / dexamethasone ointment twice a day       Preservative free artificial tears every 1-2 hours    _______________________________________________________________      For the left eye:      Dorzolamide / timolol drops (dark blue cap) twice a day             Follow-ups after your visit        Your next 10 appointments already scheduled     Jul 13, 2017   Procedure with Bruno Vazquez MD   [facility] PeriOP Services (--)    [address]  [facility] [place] [facility] [address]   [phone]            Jul 25, 2017  1:20 PM CDT   Return Visit with Dolly Granda MD   [facility] [place] ([facility])    [address]  [address]  [facility] [address] [phone]            Aug 01, 2017  1:00 PM CDT   Programmable Hearing Aid Check with Scarlett Sanchez [facility] ([facility])    [address] [facility]  [address] [facility] [address] [phone]              Who to contact     Please call your clinic at [phone] to:    Ask questions about your health    Make or cancel appointments    Discuss your medicines    Learn about your test results    Speak to your doctor   If you have compliments or concerns about an experience at your clinic, or if you wish to file a complaint, please contact [facility] Patient Relations at [phone] or email us at [email or web address]         Additional Information About Your Visit        MyChart Information     [employer] is an electronic gateway that provides  easy, online access to your medical records. With [employer], you can request a clinic appointment, read your test results, renew a prescription or communicate with your care team.     To sign up for [employer] visit the website at [email or web address]   You will be asked to enter the access code listed below, as well as some personal information. Please follow the directions to create your username and password.     Your access code is: [email or web address]  Expires: 2017 10:39 AM     Your access code will  in 90 days. If you need help or a new code, please contact your [facility] or call [phone] for assistance.        Care EveryWhere ID     This is your Care EveryWhere ID. This could be used by other organizations to access your [place] medical records  [id number]         Blood Pressure from Last 3 Encounters:   17 158/76   17 156/66   17 156/72    Weight from Last 3 Encounters:   17 62.1 kg (137 lb)   17 62.1 kg (137 lb)   17 62.2 kg (137 lb 3.2 oz)              Today, you had the following     No orders found for display       Primary Care Provider Office Phone # Fax #    Juani SRIKANTH Choudhary [phone]       [facility] [address]        Thank you!     Thank you for choosing EYE CLINIC  for your care. Our goal is always to provide you with excellent care. Hearing back from our patients is one way we can continue to improve our services. Please take a few minutes to complete the written survey that you may receive in the mail after your visit with us. Thank you!             Your Updated Medication List - Protect others around you: Learn how to safely use, store and throw away your medicines at www.disposemymeds.org.          This list is accurate as of: 17 11:26 AM.  Always use your most recent med list.                   Brand Name Dispense Instructions for use     artificial tears Oint ophthalmic ointment     1 Tube    Place 1 g into the right eye At Bedtime Every night and during the day as needed       ARTIFICIAL TEARS OP      Apply 1 drop to eye as needed       dorzolamide-timolol 2-0.5 % ophthalmic solution    COSOPT    1 Bottle    Place 1 drop Into the left eye 2 times daily

## 2017-06-20 NOTE — NURSING NOTE
Chief Complaints and History of Present Illnesses   Patient presents with     Follow Up For     s/p IOL explantation/ant vit/ACIOL right eye (2/15/17)     HPI    Affected eye(s):  Both   Symptoms:        Duration:  6 weeks   Frequency:  Constant       Do you have eye pain now?:  No      Comments:  Pt. States that VA continues to worsen.  Has been having itching RE.  RE is also painful when touching.   .Fidelina House COT 10:12 AM June 20, 2017

## 2017-06-20 NOTE — PROGRESS NOTES
CC: Red and painful right eye    Right eye pain, itching, and irritation since surgery; gets relief with ointment  Saw Low Vision yesterday    Gtts:   cosopt OS BID  [place] ointment OD qhs   ATs (not using).     A/P:    1. POM #4 IOL explantation/ant vit/ACIOL right eye (2/15/17)    Dry and irregular surface with chronic discomfort    Vision limited by advanced glaucoma    Right UL / LL plugs today (collagen) placed at last visit. Surface with mild PEE.     IOP good on cosopt BID OS     Continue with ointment qhs and artifical tears throughout the day.     RTC in 3-4 months, earlier as needed.    Eder Fletcher MD  Ophthalmology resident, PGY-3          ~~~~~~~~~~~~~~~~~~~~~~~~~~~~~~~~~~~~~~~~~~~~~~~~~~~~~~~~~~~~~~~~    Complete documentation of historical and exam elements from today's encounter can be found in the full encounter summary report (not reduplicated in this progress note). I personally obtained the chief complaint(s) and history of present illness.  I confirmed and edited as necessary the review of systems, past medical/surgical history, family history, social history, and examination findings as documented by others.  I examined the patient myself, and I personally reviewed the relevant tests, images, and reports as documented above. I formulated and edited as necessary the assessment and plan and discussed the findings and management plan with the patient and family.     Isai Varma MD, MA  Director, Cornea & Anterior Segment  [facility] Department of Ophthalmology & Visual Neuroscience

## 2017-06-20 NOTE — PATIENT INSTRUCTIONS
For the right eye:      Restart neomycin / polymyxin / dexamethasone ointment twice a day       Preservative free artificial tears every 1-2 hours    _______________________________________________________________      For the left eye:      Dorzolamide / timolol drops (dark blue cap) twice a day

## 2017-07-12 ENCOUNTER — TRANSFERRED RECORDS (OUTPATIENT)
Dept: HEALTH INFORMATION MANAGEMENT | Facility: CLINIC | Age: 67
End: 2017-07-12

## 2017-07-13 ENCOUNTER — ANESTHESIA (OUTPATIENT)
Dept: SURGERY | Facility: CLINIC | Age: 67
DRG: 708 | End: 2017-07-13
Payer: COMMERCIAL

## 2017-07-13 ENCOUNTER — ANESTHESIA EVENT (OUTPATIENT)
Dept: SURGERY | Facility: CLINIC | Age: 67
DRG: 708 | End: 2017-07-13
Payer: COMMERCIAL

## 2017-07-13 ENCOUNTER — HOSPITAL ENCOUNTER (INPATIENT)
Facility: CLINIC | Age: 67
LOS: 2 days | Discharge: HOME | DRG: 708 | End: 2017-07-15
Attending: UROLOGY | Admitting: UROLOGY
Payer: COMMERCIAL

## 2017-07-13 DIAGNOSIS — C61 PROSTATE CANCER (H): Primary | ICD-10-CM

## 2017-07-13 LAB
ANION GAP SERPL CALCULATED.3IONS-SCNC: 8 MMOL/L (ref 3–14)
BUN SERPL-MCNC: 12 MG/DL (ref 7–30)
CALCIUM SERPL-MCNC: 8 MG/DL (ref 8.5–10.1)
CHLORIDE SERPL-SCNC: 107 MMOL/L (ref 94–109)
CO2 SERPL-SCNC: 25 MMOL/L (ref 20–32)
CREAT FLD-MCNC: 1 MG/DL
CREAT SERPL-MCNC: 0.84 MG/DL (ref 0.66–1.25)
ERYTHROCYTE [DISTWIDTH] IN BLOOD BY AUTOMATED COUNT: 13.1 % (ref 10–15)
GFR SERPL CREATININE-BSD FRML MDRD: ABNORMAL ML/MIN/1.7M2
GLUCOSE SERPL-MCNC: 147 MG/DL (ref 70–99)
HCT VFR BLD AUTO: 36.3 % (ref 40–53)
HGB BLD-MCNC: 11.9 G/DL (ref 13.3–17.7)
HGB BLD-MCNC: 13.3 G/DL (ref 13.3–17.7)
MCH RBC QN AUTO: 29.1 PG (ref 26.5–33)
MCHC RBC AUTO-ENTMCNC: 32.8 G/DL (ref 31.5–36.5)
MCV RBC AUTO: 89 FL (ref 78–100)
PLATELET # BLD AUTO: 229 10E9/L (ref 150–450)
POTASSIUM SERPL-SCNC: 3.6 MMOL/L (ref 3.4–5.3)
RBC # BLD AUTO: 4.09 10E12/L (ref 4.4–5.9)
SODIUM SERPL-SCNC: 140 MMOL/L (ref 133–144)
SPECIMEN SOURCE FLD: NORMAL
WBC # BLD AUTO: 11 10E9/L (ref 4–11)

## 2017-07-13 PROCEDURE — 25000125 ZZHC RX 250: Performed by: NURSE ANESTHETIST, CERTIFIED REGISTERED

## 2017-07-13 PROCEDURE — 82570 ASSAY OF URINE CREATININE: CPT | Performed by: UROLOGY

## 2017-07-13 PROCEDURE — 36000085 ZZH SURGERY LEVEL 8 1ST 30 MIN: Performed by: UROLOGY

## 2017-07-13 PROCEDURE — S0020 INJECTION, BUPIVICAINE HYDRO: HCPCS | Performed by: UROLOGY

## 2017-07-13 PROCEDURE — 27210794 ZZH OR GENERAL SUPPLY STERILE: Performed by: UROLOGY

## 2017-07-13 PROCEDURE — 27210995 ZZH RX 272: Performed by: UROLOGY

## 2017-07-13 PROCEDURE — 25000128 H RX IP 250 OP 636: Performed by: UROLOGY

## 2017-07-13 PROCEDURE — 55866 LAPS SURG PRST8ECT RPBIC RAD: CPT | Mod: 80 | Performed by: UROLOGY

## 2017-07-13 PROCEDURE — 36000087 ZZH SURGERY LEVEL 8 EA 15 ADDTL MIN: Performed by: UROLOGY

## 2017-07-13 PROCEDURE — 25000128 H RX IP 250 OP 636: Performed by: NURSE ANESTHETIST, CERTIFIED REGISTERED

## 2017-07-13 PROCEDURE — 36415 COLL VENOUS BLD VENIPUNCTURE: CPT | Performed by: ANESTHESIOLOGY

## 2017-07-13 PROCEDURE — 55866 LAPS SURG PRST8ECT RPBIC RAD: CPT | Performed by: UROLOGY

## 2017-07-13 PROCEDURE — 25800025 ZZH RX 258: Performed by: UROLOGY

## 2017-07-13 PROCEDURE — 71000012 ZZH RECOVERY PHASE 1 LEVEL 1 FIRST HR: Performed by: UROLOGY

## 2017-07-13 PROCEDURE — 71000013 ZZH RECOVERY PHASE 1 LEVEL 1 EA ADDTL HR: Performed by: UROLOGY

## 2017-07-13 PROCEDURE — 12000000 ZZH R&B MED SURG/OB

## 2017-07-13 PROCEDURE — 0VT04ZZ RESECTION OF PROSTATE, PERCUTANEOUS ENDOSCOPIC APPROACH: ICD-10-PCS | Performed by: UROLOGY

## 2017-07-13 PROCEDURE — 37000008 ZZH ANESTHESIA TECHNICAL FEE, 1ST 30 MIN: Performed by: UROLOGY

## 2017-07-13 PROCEDURE — 85018 HEMOGLOBIN: CPT | Performed by: ANESTHESIOLOGY

## 2017-07-13 PROCEDURE — 25000132 ZZH RX MED GY IP 250 OP 250 PS 637: Performed by: UROLOGY

## 2017-07-13 PROCEDURE — 80048 BASIC METABOLIC PNL TOTAL CA: CPT | Performed by: UROLOGY

## 2017-07-13 PROCEDURE — 25000128 H RX IP 250 OP 636: Performed by: ANESTHESIOLOGY

## 2017-07-13 PROCEDURE — 85027 COMPLETE CBC AUTOMATED: CPT | Performed by: UROLOGY

## 2017-07-13 PROCEDURE — 07TC4ZZ RESECTION OF PELVIS LYMPHATIC, PERCUTANEOUS ENDOSCOPIC APPROACH: ICD-10-PCS | Performed by: UROLOGY

## 2017-07-13 PROCEDURE — 25000566 ZZH SEVOFLURANE, EA 15 MIN: Performed by: UROLOGY

## 2017-07-13 PROCEDURE — 37000009 ZZH ANESTHESIA TECHNICAL FEE, EACH ADDTL 15 MIN: Performed by: UROLOGY

## 2017-07-13 PROCEDURE — 25000125 ZZHC RX 250: Performed by: UROLOGY

## 2017-07-13 PROCEDURE — 36415 COLL VENOUS BLD VENIPUNCTURE: CPT | Performed by: UROLOGY

## 2017-07-13 PROCEDURE — 88309 TISSUE EXAM BY PATHOLOGIST: CPT | Mod: 26 | Performed by: UROLOGY

## 2017-07-13 PROCEDURE — 38571 LAPAROSCOPY LYMPHADENECTOMY: CPT | Mod: 80 | Performed by: UROLOGY

## 2017-07-13 PROCEDURE — 38571 LAPAROSCOPY LYMPHADENECTOMY: CPT | Performed by: UROLOGY

## 2017-07-13 PROCEDURE — 40000170 ZZH STATISTIC PRE-PROCEDURE ASSESSMENT II: Performed by: UROLOGY

## 2017-07-13 PROCEDURE — 8E0W4CZ ROBOTIC ASSISTED PROCEDURE OF TRUNK REGION, PERCUTANEOUS ENDOSCOPIC APPROACH: ICD-10-PCS | Performed by: UROLOGY

## 2017-07-13 PROCEDURE — 88309 TISSUE EXAM BY PATHOLOGIST: CPT | Performed by: UROLOGY

## 2017-07-13 RX ORDER — NALOXONE HYDROCHLORIDE 0.4 MG/ML
.1-.4 INJECTION, SOLUTION INTRAMUSCULAR; INTRAVENOUS; SUBCUTANEOUS
Status: DISCONTINUED | OUTPATIENT
Start: 2017-07-13 | End: 2017-07-15 | Stop reason: HOSPADM

## 2017-07-13 RX ORDER — OXYCODONE HYDROCHLORIDE 5 MG/1
5 TABLET ORAL EVERY 6 HOURS PRN
Qty: 30 TABLET | Refills: 0 | Status: SHIPPED | OUTPATIENT
Start: 2017-07-13 | End: 2018-03-20

## 2017-07-13 RX ORDER — SODIUM CHLORIDE 9 MG/ML
INJECTION, SOLUTION INTRAVENOUS CONTINUOUS
Status: DISCONTINUED | OUTPATIENT
Start: 2017-07-13 | End: 2017-07-14

## 2017-07-13 RX ORDER — SODIUM CHLORIDE, SODIUM LACTATE, POTASSIUM CHLORIDE, CALCIUM CHLORIDE 600; 310; 30; 20 MG/100ML; MG/100ML; MG/100ML; MG/100ML
INJECTION, SOLUTION INTRAVENOUS CONTINUOUS PRN
Status: DISCONTINUED | OUTPATIENT
Start: 2017-07-13 | End: 2017-07-13

## 2017-07-13 RX ORDER — PROCHLORPERAZINE MALEATE 5 MG
5 TABLET ORAL EVERY 6 HOURS PRN
Status: DISCONTINUED | OUTPATIENT
Start: 2017-07-13 | End: 2017-07-15 | Stop reason: HOSPADM

## 2017-07-13 RX ORDER — DOCUSATE SODIUM 100 MG/1
100 CAPSULE, LIQUID FILLED ORAL 2 TIMES DAILY
Status: DISCONTINUED | OUTPATIENT
Start: 2017-07-13 | End: 2017-07-15 | Stop reason: HOSPADM

## 2017-07-13 RX ORDER — BUPIVACAINE HYDROCHLORIDE 2.5 MG/ML
INJECTION, SOLUTION INFILTRATION; PERINEURAL PRN
Status: DISCONTINUED | OUTPATIENT
Start: 2017-07-13 | End: 2017-07-13 | Stop reason: HOSPADM

## 2017-07-13 RX ORDER — NALOXONE HYDROCHLORIDE 1 MG/ML
0.1 INJECTION INTRAMUSCULAR; INTRAVENOUS; SUBCUTANEOUS ONCE
Status: COMPLETED | OUTPATIENT
Start: 2017-07-13 | End: 2017-07-13

## 2017-07-13 RX ORDER — CEFAZOLIN SODIUM 1 G/3ML
1 INJECTION, POWDER, FOR SOLUTION INTRAMUSCULAR; INTRAVENOUS SEE ADMIN INSTRUCTIONS
Status: DISCONTINUED | OUTPATIENT
Start: 2017-07-13 | End: 2017-07-13 | Stop reason: HOSPADM

## 2017-07-13 RX ORDER — LIDOCAINE HYDROCHLORIDE 20 MG/ML
INJECTION, SOLUTION INFILTRATION; PERINEURAL PRN
Status: DISCONTINUED | OUTPATIENT
Start: 2017-07-13 | End: 2017-07-13

## 2017-07-13 RX ORDER — SODIUM CHLORIDE, SODIUM LACTATE, POTASSIUM CHLORIDE, CALCIUM CHLORIDE 600; 310; 30; 20 MG/100ML; MG/100ML; MG/100ML; MG/100ML
INJECTION, SOLUTION INTRAVENOUS CONTINUOUS
Status: DISCONTINUED | OUTPATIENT
Start: 2017-07-13 | End: 2017-07-13 | Stop reason: HOSPADM

## 2017-07-13 RX ORDER — MAGNESIUM HYDROXIDE 1200 MG/15ML
LIQUID ORAL PRN
Status: DISCONTINUED | OUTPATIENT
Start: 2017-07-13 | End: 2017-07-13 | Stop reason: HOSPADM

## 2017-07-13 RX ORDER — HYDROMORPHONE HYDROCHLORIDE 1 MG/ML
.2-.4 INJECTION, SOLUTION INTRAMUSCULAR; INTRAVENOUS; SUBCUTANEOUS
Status: DISCONTINUED | OUTPATIENT
Start: 2017-07-13 | End: 2017-07-15 | Stop reason: HOSPADM

## 2017-07-13 RX ORDER — FENTANYL CITRATE 0.05 MG/ML
25-50 INJECTION, SOLUTION INTRAMUSCULAR; INTRAVENOUS
Status: DISCONTINUED | OUTPATIENT
Start: 2017-07-13 | End: 2017-07-13 | Stop reason: HOSPADM

## 2017-07-13 RX ORDER — ONDANSETRON 2 MG/ML
4 INJECTION INTRAMUSCULAR; INTRAVENOUS EVERY 6 HOURS PRN
Status: DISCONTINUED | OUTPATIENT
Start: 2017-07-13 | End: 2017-07-15 | Stop reason: HOSPADM

## 2017-07-13 RX ORDER — GLYCOPYRROLATE 0.2 MG/ML
INJECTION, SOLUTION INTRAMUSCULAR; INTRAVENOUS PRN
Status: DISCONTINUED | OUTPATIENT
Start: 2017-07-13 | End: 2017-07-13

## 2017-07-13 RX ORDER — EPHEDRINE SULFATE 50 MG/ML
INJECTION, SOLUTION INTRAMUSCULAR; INTRAVENOUS; SUBCUTANEOUS PRN
Status: DISCONTINUED | OUTPATIENT
Start: 2017-07-13 | End: 2017-07-13

## 2017-07-13 RX ORDER — FENTANYL CITRATE 50 UG/ML
INJECTION, SOLUTION INTRAMUSCULAR; INTRAVENOUS PRN
Status: DISCONTINUED | OUTPATIENT
Start: 2017-07-13 | End: 2017-07-13

## 2017-07-13 RX ORDER — ACETAMINOPHEN 325 MG/1
975 TABLET ORAL EVERY 8 HOURS
Status: DISCONTINUED | OUTPATIENT
Start: 2017-07-13 | End: 2017-07-15 | Stop reason: HOSPADM

## 2017-07-13 RX ORDER — LIDOCAINE 40 MG/G
CREAM TOPICAL
Status: DISCONTINUED | OUTPATIENT
Start: 2017-07-13 | End: 2017-07-15 | Stop reason: HOSPADM

## 2017-07-13 RX ORDER — ONDANSETRON 4 MG/1
4 TABLET, ORALLY DISINTEGRATING ORAL EVERY 30 MIN PRN
Status: DISCONTINUED | OUTPATIENT
Start: 2017-07-13 | End: 2017-07-13 | Stop reason: HOSPADM

## 2017-07-13 RX ORDER — NEOMYCIN/BACITRACIN/POLYMYXINB 3.5-400-5K
OINTMENT (GRAM) TOPICAL 2 TIMES DAILY
Status: DISCONTINUED | OUTPATIENT
Start: 2017-07-13 | End: 2017-07-15 | Stop reason: HOSPADM

## 2017-07-13 RX ORDER — NEOMYCIN/BACITRACIN/POLYMYXINB 3.5-400-5K
OINTMENT (GRAM) TOPICAL
Status: DISCONTINUED | OUTPATIENT
Start: 2017-07-13 | End: 2017-07-15 | Stop reason: HOSPADM

## 2017-07-13 RX ORDER — ONDANSETRON 2 MG/ML
4 INJECTION INTRAMUSCULAR; INTRAVENOUS EVERY 30 MIN PRN
Status: DISCONTINUED | OUTPATIENT
Start: 2017-07-13 | End: 2017-07-13 | Stop reason: HOSPADM

## 2017-07-13 RX ORDER — ONDANSETRON 2 MG/ML
INJECTION INTRAMUSCULAR; INTRAVENOUS PRN
Status: DISCONTINUED | OUTPATIENT
Start: 2017-07-13 | End: 2017-07-13

## 2017-07-13 RX ORDER — NEOSTIGMINE METHYLSULFATE 1 MG/ML
VIAL (ML) INJECTION PRN
Status: DISCONTINUED | OUTPATIENT
Start: 2017-07-13 | End: 2017-07-13

## 2017-07-13 RX ORDER — PROPOFOL 10 MG/ML
INJECTION, EMULSION INTRAVENOUS PRN
Status: DISCONTINUED | OUTPATIENT
Start: 2017-07-13 | End: 2017-07-13

## 2017-07-13 RX ORDER — KETOROLAC TROMETHAMINE 15 MG/ML
15 INJECTION, SOLUTION INTRAMUSCULAR; INTRAVENOUS EVERY 6 HOURS
Status: DISCONTINUED | OUTPATIENT
Start: 2017-07-13 | End: 2017-07-15 | Stop reason: HOSPADM

## 2017-07-13 RX ORDER — OXYCODONE HYDROCHLORIDE 5 MG/1
5-10 TABLET ORAL EVERY 4 HOURS PRN
Status: DISCONTINUED | OUTPATIENT
Start: 2017-07-13 | End: 2017-07-15 | Stop reason: HOSPADM

## 2017-07-13 RX ORDER — DEXAMETHASONE SODIUM PHOSPHATE 4 MG/ML
INJECTION, SOLUTION INTRA-ARTICULAR; INTRALESIONAL; INTRAMUSCULAR; INTRAVENOUS; SOFT TISSUE PRN
Status: DISCONTINUED | OUTPATIENT
Start: 2017-07-13 | End: 2017-07-13

## 2017-07-13 RX ORDER — VECURONIUM BROMIDE 1 MG/ML
INJECTION, POWDER, LYOPHILIZED, FOR SOLUTION INTRAVENOUS PRN
Status: DISCONTINUED | OUTPATIENT
Start: 2017-07-13 | End: 2017-07-13

## 2017-07-13 RX ORDER — ONDANSETRON 4 MG/1
4 TABLET, ORALLY DISINTEGRATING ORAL EVERY 6 HOURS PRN
Status: DISCONTINUED | OUTPATIENT
Start: 2017-07-13 | End: 2017-07-15 | Stop reason: HOSPADM

## 2017-07-13 RX ORDER — CALCIUM CARBONATE 500 MG/1
500-1000 TABLET, CHEWABLE ORAL
Status: DISCONTINUED | OUTPATIENT
Start: 2017-07-13 | End: 2017-07-15 | Stop reason: HOSPADM

## 2017-07-13 RX ORDER — CEFAZOLIN SODIUM 2 G/100ML
2 INJECTION, SOLUTION INTRAVENOUS
Status: COMPLETED | OUTPATIENT
Start: 2017-07-13 | End: 2017-07-13

## 2017-07-13 RX ADMIN — VECURONIUM BROMIDE 1 MG: 1 INJECTION, POWDER, LYOPHILIZED, FOR SOLUTION INTRAVENOUS at 14:26

## 2017-07-13 RX ADMIN — PHENYLEPHRINE HYDROCHLORIDE 0.4 MCG/KG/MIN: 10 INJECTION, SOLUTION INTRAMUSCULAR; INTRAVENOUS; SUBCUTANEOUS at 12:44

## 2017-07-13 RX ADMIN — SODIUM CHLORIDE, POTASSIUM CHLORIDE, SODIUM LACTATE AND CALCIUM CHLORIDE: 600; 310; 30; 20 INJECTION, SOLUTION INTRAVENOUS at 11:41

## 2017-07-13 RX ADMIN — ROCURONIUM BROMIDE 50 MG: 10 INJECTION INTRAVENOUS at 12:18

## 2017-07-13 RX ADMIN — ONDANSETRON 4 MG: 2 INJECTION INTRAMUSCULAR; INTRAVENOUS at 15:33

## 2017-07-13 RX ADMIN — GLYCOPYRROLATE 0.2 MG: 0.2 INJECTION, SOLUTION INTRAMUSCULAR; INTRAVENOUS at 14:27

## 2017-07-13 RX ADMIN — ACETAMINOPHEN 975 MG: 325 TABLET, FILM COATED ORAL at 19:27

## 2017-07-13 RX ADMIN — MIDAZOLAM HYDROCHLORIDE 2 MG: 1 INJECTION, SOLUTION INTRAMUSCULAR; INTRAVENOUS at 12:14

## 2017-07-13 RX ADMIN — CEFAZOLIN SODIUM 2 G: 2 INJECTION, SOLUTION INTRAVENOUS at 12:25

## 2017-07-13 RX ADMIN — FENTANYL CITRATE 50 MCG: 50 INJECTION, SOLUTION INTRAMUSCULAR; INTRAVENOUS at 12:52

## 2017-07-13 RX ADMIN — NALOXONE HYDROCHLORIDE 0.1 MG: 1 INJECTION PARENTERAL at 16:41

## 2017-07-13 RX ADMIN — CALCIUM CARBONATE (ANTACID) CHEW TAB 500 MG 500 MG: 500 CHEW TAB at 22:38

## 2017-07-13 RX ADMIN — HYDROMORPHONE HYDROCHLORIDE 0.25 MG: 1 INJECTION, SOLUTION INTRAMUSCULAR; INTRAVENOUS; SUBCUTANEOUS at 15:17

## 2017-07-13 RX ADMIN — DEXMEDETOMIDINE HYDROCHLORIDE 0.5 MCG/KG/HR: 100 INJECTION, SOLUTION INTRAVENOUS at 13:29

## 2017-07-13 RX ADMIN — SODIUM CHLORIDE, POTASSIUM CHLORIDE, SODIUM LACTATE AND CALCIUM CHLORIDE: 600; 310; 30; 20 INJECTION, SOLUTION INTRAVENOUS at 17:21

## 2017-07-13 RX ADMIN — VECURONIUM BROMIDE 2 MG: 1 INJECTION, POWDER, LYOPHILIZED, FOR SOLUTION INTRAVENOUS at 13:31

## 2017-07-13 RX ADMIN — CEFAZOLIN 1 G: 1 INJECTION, POWDER, FOR SOLUTION INTRAMUSCULAR; INTRAVENOUS at 14:21

## 2017-07-13 RX ADMIN — NEOSTIGMINE METHYLSULFATE 3 MG: 1 INJECTION INTRAMUSCULAR; INTRAVENOUS; SUBCUTANEOUS at 15:53

## 2017-07-13 RX ADMIN — LIDOCAINE HYDROCHLORIDE 60 MG: 20 INJECTION, SOLUTION INFILTRATION; PERINEURAL at 12:17

## 2017-07-13 RX ADMIN — FENTANYL CITRATE 50 MCG: 50 INJECTION, SOLUTION INTRAMUSCULAR; INTRAVENOUS at 12:58

## 2017-07-13 RX ADMIN — VECURONIUM BROMIDE 1 MG: 1 INJECTION, POWDER, LYOPHILIZED, FOR SOLUTION INTRAVENOUS at 14:52

## 2017-07-13 RX ADMIN — HYDROMORPHONE HYDROCHLORIDE 0.25 MG: 1 INJECTION, SOLUTION INTRAMUSCULAR; INTRAVENOUS; SUBCUTANEOUS at 14:54

## 2017-07-13 RX ADMIN — GLYCOPYRROLATE 0.4 MG: 0.2 INJECTION, SOLUTION INTRAMUSCULAR; INTRAVENOUS at 15:53

## 2017-07-13 RX ADMIN — PROPOFOL 100 MG: 10 INJECTION, EMULSION INTRAVENOUS at 12:17

## 2017-07-13 RX ADMIN — HYDROMORPHONE HYDROCHLORIDE 0.2 MG: 1 INJECTION, SOLUTION INTRAMUSCULAR; INTRAVENOUS; SUBCUTANEOUS at 20:00

## 2017-07-13 RX ADMIN — BACITRACIN ZINC, NEOMYCIN, POLYMYXIN B: 400; 3.5; 5 OINTMENT TOPICAL at 21:08

## 2017-07-13 RX ADMIN — PHENYLEPHRINE HYDROCHLORIDE 50 MCG: 10 INJECTION, SOLUTION INTRAMUSCULAR; INTRAVENOUS; SUBCUTANEOUS at 15:04

## 2017-07-13 RX ADMIN — Medication 5 MG: at 14:30

## 2017-07-13 RX ADMIN — DEXAMETHASONE SODIUM PHOSPHATE 4 MG: 4 INJECTION, SOLUTION INTRA-ARTICULAR; INTRALESIONAL; INTRAMUSCULAR; INTRAVENOUS; SOFT TISSUE at 12:44

## 2017-07-13 RX ADMIN — HYDROMORPHONE HYDROCHLORIDE 0.25 MG: 1 INJECTION, SOLUTION INTRAMUSCULAR; INTRAVENOUS; SUBCUTANEOUS at 16:07

## 2017-07-13 RX ADMIN — SODIUM CHLORIDE, POTASSIUM CHLORIDE, SODIUM LACTATE AND CALCIUM CHLORIDE: 600; 310; 30; 20 INJECTION, SOLUTION INTRAVENOUS at 15:15

## 2017-07-13 RX ADMIN — DOCUSATE SODIUM 100 MG: 100 CAPSULE, LIQUID FILLED ORAL at 21:07

## 2017-07-13 RX ADMIN — PROPOFOL 40 MG: 10 INJECTION, EMULSION INTRAVENOUS at 13:21

## 2017-07-13 RX ADMIN — FENTANYL CITRATE 150 MCG: 50 INJECTION, SOLUTION INTRAMUSCULAR; INTRAVENOUS at 12:17

## 2017-07-13 RX ADMIN — SODIUM CHLORIDE: 9 INJECTION, SOLUTION INTRAVENOUS at 19:25

## 2017-07-13 RX ADMIN — SODIUM CHLORIDE, POTASSIUM CHLORIDE, SODIUM LACTATE AND CALCIUM CHLORIDE: 600; 310; 30; 20 INJECTION, SOLUTION INTRAVENOUS at 12:25

## 2017-07-13 ASSESSMENT — LIFESTYLE VARIABLES: TOBACCO_USE: 0

## 2017-07-13 ASSESSMENT — PAIN DESCRIPTION - DESCRIPTORS: DESCRIPTORS: ACHING

## 2017-07-13 ASSESSMENT — ENCOUNTER SYMPTOMS: SEIZURES: 0

## 2017-07-13 NOTE — IP AVS SNAPSHOT
[address]    [facility] [address]    Phone:  [phone]                                       After Visit Summary   7/13/2017    Shayne Fiore    MRN: [id number]           After Visit Summary Signature Page     I have received my discharge instructions, and my questions have been answered. I have discussed any challenges I see with this plan with the nurse or doctor.    ..........................................................................................................................................  Patient/Patient Representative Signature      ..........................................................................................................................................  Patient Representative Print Name and Relationship to Patient    ..................................................               ................................................  Date                                            Time    ..........................................................................................................................................  Reviewed by Signature/Title    ...................................................              ..............................................  Date                                                            Time

## 2017-07-13 NOTE — PROGRESS NOTES
Admission medication history interview status for the 7/13/2017  admission is complete. See EPIC admission navigator for prior to admission medications     Medication history source reliability:Good    Medication history interview source(s):Patient    Medication history resources (including written lists, pill bottles, clinic record):None    Primary pharmacy.[facility]    Additional medication history information not noted on PTA med list :None    Time spent in this activity: 45 minutes    Prior to Admission medications    Medication Sig Last Dose Taking? Auth Provider   dorzolamide-timolol (COSOPT) 2-0.5 % ophthalmic solution Place 1 drop Into the left eye 2 times daily 7/10/2017 Yes Page, Isai Espino MD   Hypromellose (ARTIFICIAL TEARS OP) Apply 1 drop to eye daily as needed  7/12/2017 at 1200 Yes Reported, Patient

## 2017-07-13 NOTE — ANESTHESIA PREPROCEDURE EVALUATION
Anesthesia Evaluation     . Pt has had prior anesthetic.     No history of anesthetic complications          ROS/MED HX    ENT/Pulmonary:      (-) tobacco use and sleep apnea   Neurologic: Comment: Blind and hard of hearing      (-) seizures and CVA   Cardiovascular:        (-) hypertension   METS/Exercise Tolerance:     Hematologic:         Musculoskeletal:         GI/Hepatic:     (+) GERD Asymptomatic on medication,      (-) liver disease   Renal/Genitourinary:      (-) renal disease   Endo:      (-) Type II DM and thyroid disease   Psychiatric:         Infectious Disease:        (-) Recent Fever   Malignancy:   (+) Malignancy History of Prostate  Prostate CA Active status post,         Other:                     Physical Exam  Normal systems: cardiovascular, pulmonary and dental    Airway   Mallampati: I  TM distance: >3 FB  Neck ROM: full    Dental     Cardiovascular       Pulmonary                     Anesthesia Plan      History & Physical Review  History and physical reviewed and following examination; no interval change.    ASA Status:  3 .    NPO Status:  > 8 hours    Plan for General and ETT with Propofol induction. Maintenance will be Balanced.    PONV prophylaxis:  Ondansetron (or other 5HT-3) and Dexamethasone or Solumedrol  Additional equipment: 2nd IV      Postoperative Care  Postoperative pain management:  IV analgesics.      Consents  Anesthetic plan, risks, benefits and alternatives discussed with:  Patient..                          .

## 2017-07-13 NOTE — OR NURSING
Awakening much more- still sleepy but awakens for more prolonged periods- son at bedside assist w encouraging increased resp rate. rr- 19 N/C FiO2 4 ltres- O2 sats 100%. Will keep in PACU for additional 15 minutes then ready to send up to rm 8820-1 per Dr Bueno

## 2017-07-13 NOTE — IP AVS SNAPSHOT
MRN:[id number]                      After Visit Summary   7/13/2017    Shayne Fiore    MRN: [id number]           Thank you!     Thank you for choosing [facility] for your care. Our goal is always to provide you with excellent care. Hearing back from our patients is one way we can continue to improve our services. Please take a few minutes to complete the written survey that you may receive in the mail after you visit with us. Thank you!        Patient Information     Date Of Birth          1950        Designated Caregiver       Most Recent Value    Caregiver    Will someone help with your care after discharge? yes    Name of designated caregiver sreekanth    Phone number of caregiver     Caregiver address [address]       About your hospital stay     You were admitted on:  July 13, 2017 You last received care in the:  [place] [address] [facility]    You were discharged on:  July 15, 2017        Reason for your hospital stay       You were hospitalized for surgery to remove your prostate                  Who to Call     For medical emergencies, please call 911.  For non-urgent questions about your medical care, please call your primary care provider or clinic, [phone]  For questions related to your surgery, please call your surgery clinic        Attending Provider     Provider Specialty    Bruno Vazquez MD Urology    [facility], Dolly Vaca MD Urology       Primary Care Provider Office Phone # Fax #    Juani GroveSRIKANTH zee [phone]      After Care Instructions     Activity       Activity  - No strenuous exercise for 6 weeks.  - No lifting, pushing, pulling more than 10 pounds for 6 weeks.   - Do not strain with bowel movements.  - Do not drive until you can press the brake pedal quickly and fully without pain.   - Do not operate a motor vehicle while taking narcotic pain medications.     - Protect your catheter at all times;  do not let it get tugged or pulled on by furniture, clothing, or other items. Keep it secured to your leg at all times and treat it like an extension of your body. Some blood in your urine may be normal. Stay well hydrated to keep your urine as clear as possible. If your catheter gets dislodged for any reason, only a Urologist should replace it.            Diet       Follow this diet upon discharge: Regular diet            Discharge Instructions       Incisions  - You may shower and get incisions wet starting 48 hrs after surgery.  - Do not scrub incisions or submerge wounds for 2 weeks or until seen in follow-up.   - Remove wound dressing 48 hours after surgery.   - Avoid applying any lotions or ointments.   - Leave incision open to air. Cover with gauze only if needed for comfort or to protect clothing from drainage.   - The stitches do not need to be removed, they will dissolve on their own.    Medications  - Transition from narcotic pain medications to tylenol (acetaminophen) as you are able.  Wean yourself off all pain medications as you are able.  - Some pain medications contain both tylenol (acetaminophen) and a narcotic (Norco, vicodin, percocet), do not take more than 4,000mg of Tylenol (acetaminophen) from all sources in any 24 hour period.  - Narcotics can make you constipated.  Take over the counter fiber (metamucil or benefiber) and stool softeners (miralax, docusate or senna) while taking narcotic pain medications, but stop if you develop diarrhea.  - No driving or operating machinery while taking narcotic pain medications     Follow-Up:  - In 2 weeks for removal of your escamilla catheter with Dr. Vazquez/Jesus Alberto  - Call your primary care provider to touch base regarding your recent admission.    - Call or return sooner than your regularly scheduled visit if you develop any of the following: fever (greater than 101.5), uncontrolled pain, uncontrolled nausea or vomiting, as well as increased redness,  "swelling, or drainage from your wound.     Phone numbers:   - Monday through Friday 8am to 4:30pm: Call [phone] with questions or to schedule or confirm appointment.    - Nights or weekends: call the after hours emergency pager - [phone] and tell the  \"I would like to page the Urology Resident on call.\"  - For emergencies, call 911            Supplies       List the supplies the pt needs to go home:  Extra leg bag, night bag, catheter secure straps, and escamilla catheter teaching            Tubes and drains       You are going home with the following tubes or drains: Escamilla catheter                  Follow-up Appointments     Follow-up and recommended labs and tests        Follow-up in 2 weeks for removal of your escamilla catheter                  Your next 10 appointments already scheduled     Jul 25, 2017  1:15 PM CDT   Return Visit with Dolly Granda MD   [facility] ([facility])    [address]  [address]  [facility] [facility] [address]   [phone]            Aug 01, 2017  1:00 PM CDT   Programmable Hearing Aid Check with Scarlett Sanchez [facility] ([facility])    [address]  4th Floor  [place] [id number] [phone]            Oct 17, 2017  2:15 PM CDT   RETURN CORNEA with Isai Varma MD   [facility] ([facility])    [address]  [address]  [address]  [place] [address] [phone]              Pending Results     Date and Time Order Name Status Description    7/13/2017 1514 Surgical pathology exam In process     7/12/2017 0000 EKG CARDIAC - HIM SCAN Preliminary             Statement of Approval     Ordered          07/15/17 0924  I have reviewed and agree with all the recommendations and orders detailed in this document.  EFFECTIVE NOW     Approved and electronically signed by:  Olegario Ferro MD             Admission Information  " "   Date & Time Provider Department Dept. Phone    2017 Dolly Granda MD [place] [address] On[facility] [phone]      Your Vitals Were     Blood Pressure Pulse Temperature Respirations Height Weight    160/54 71 98  F (36.7  C) (Oral) 16 1.854 m (6' 1\") 63.5 kg (140 lb)    Pulse Oximetry BMI (Body Mass Index)                99% 18.47 kg/m2          [employer] Information     [employer] lets you send messages to your doctor, view your test results, renew your prescriptions, schedule appointments and more. To sign up, go to [email or web address] . Click on \"Log in\" on the left side of the screen, which will take you to the Welcome page. Then click on \"Sign up Now\" on the right side of the page.     You will be asked to enter the access code listed below, as well as some personal information. Please follow the directions to create your username and password.     Your access code is: [email or web address]  Expires: 2017 10:39 AM     Your access code will  in 90 days. If you need help or a new code, please call your [facility] clinic or [phone].        Care EveryWhere ID     This is your Care EveryWhere ID. This could be used by other organizations to access your [facility] medical records  [id number]        Equal Access to Services     RACHELL BONDS AH: Hadii inder garcia Sochinyere, waaxda luqadaha, qaybta kaalmada iesha, macy almodovar . So [facility] [phone].    ATENCIÓN: Si habla español, tiene a dillon disposición servicios gratuitos de asistencia lingüística. Mary al [phone].    We comply with applicable federal civil rights laws and [place] laws. We do not discriminate on the basis of race, color, national origin, age, disability sex, sexual orientation or gender identity.               Review of your medicines      START taking        Dose / Directions    acetaminophen 325 MG tablet   Commonly known as:  TYLENOL        Dose:  975 mg   Take 3 tablets (975 mg) by " mouth every 8 hours   Quantity:  100 tablet   Refills:  0       ciprofloxacin 500 MG tablet   Commonly known as:  CIPRO        Dose:  500 mg   Take 1 tablet (500 mg) by mouth 2 times daily   Quantity:  28 tablet   Refills:  0       docusate sodium 100 MG capsule   Commonly known as:  COLACE        Dose:  100 mg   Take 1 capsule (100 mg) by mouth 2 times daily   Quantity:  60 capsule   Refills:  0       neomycin-bacitracin-polymyxin 5-400-5000 ointment        Apply topically 2 times daily To tip of meatus for discomfort   Quantity:  14 g   Refills:  0       oxyCODONE 5 MG IR tablet   Commonly known as:  ROXICODONE        Dose:  5 mg   Take 1 tablet (5 mg) by mouth every 6 hours as needed for moderate to severe pain   Quantity:  30 tablet   Refills:  0         CONTINUE these medicines which have NOT CHANGED        Dose / Directions    ARTIFICIAL TEARS OP        Dose:  1 drop   Apply 1 drop to eye daily as needed   Refills:  0       dorzolamide-timolol 2-0.5 % ophthalmic solution   Commonly known as:  COSOPT   Used for:  Open-angle glaucoma of left eye, unspecified glaucoma stage, unspecified open-angle glaucoma type        Dose:  1 drop   Place 1 drop Into the left eye 2 times daily   Quantity:  1 Bottle   Refills:  11            Where to get your medicines      These medications were sent to [facility] [place], [place] - [address]  [address] [place] [address], [place] [address]     Phone:  [phone]     ciprofloxacin 500 MG tablet    docusate sodium 100 MG capsule    neomycin-bacitracin-polymyxin 5-400-5000 ointment         Some of these will need a paper prescription and others can be bought over the counter. Ask your nurse if you have questions.     Bring a paper prescription for each of these medications     oxyCODONE 5 MG IR tablet       You don't need a prescription for these medications     acetaminophen 325 MG tablet                Protect others around you: Learn how to safely use, store  and throw away your medicines at www.disposemymeds.org.             Medication List: This is a list of all your medications and when to take them. Check marks below indicate your daily home schedule. Keep this list as a reference.      Medications           Morning Afternoon Evening Bedtime As Needed    acetaminophen 325 MG tablet   Commonly known as:  TYLENOL   Take 3 tablets (975 mg) by mouth every 8 hours   Last time this was given:  975 mg on 7/15/2017 10:37 AM   Next Dose Due:  6:30pm this evening 7/15                                   ARTIFICIAL TEARS OP   Apply 1 drop to eye daily as needed   Next Dose Due:  As needed                                    ciprofloxacin 500 MG tablet   Commonly known as:  CIPRO   Take 1 tablet (500 mg) by mouth 2 times daily   Next Dose Due:  This evening 7/15                                      docusate sodium 100 MG capsule   Commonly known as:  COLACE   Take 1 capsule (100 mg) by mouth 2 times daily   Last time this was given:  100 mg on 7/15/2017  8:39 AM   Next Dose Due:  This evening 7/15                                   dorzolamide-timolol 2-0.5 % ophthalmic solution   Commonly known as:  COSOPT   Place 1 drop Into the left eye 2 times daily   Next Dose Due:  Follow home schedule                                       neomycin-bacitracin-polymyxin 5-400-5000 ointment   Apply topically 2 times daily To tip of meatus for discomfort   Last time this was given:  7/15/2017  8:40 AM   Next Dose Due:  As needed                                    oxyCODONE 5 MG IR tablet   Commonly known as:  ROXICODONE   Take 1 tablet (5 mg) by mouth every 6 hours as needed for moderate to severe pain   Next Dose Due:  As needed

## 2017-07-13 NOTE — ANESTHESIA POSTPROCEDURE EVALUATION
Patient: Shayne Fiore    Procedure(s):  ROBOTIC ASSISTED BILATERAL PELVIC LYMPH NODE DISSECTION, RADICAL PUBIC PROSTATECTOMY NERVE SPARING,  COMPLEX LAPAROSCOPIC LYSIS OF ADHESIONS - Wound Class: II-Clean Contaminated  COMPLEX LYSIS OF ADHESIONS - Wound Class: I-Clean    Diagnosis:PROSTATE CANCER   Diagnosis Additional Information: No value filed.    Anesthesia Type:  General    Note:  Anesthesia Post Evaluation    Patient location during evaluation: PACU  Patient participation: Able to fully participate in evaluation  Level of consciousness: awake and alert  Pain management: satisfactory to patient  Airway patency: patent  Cardiovascular status: hemodynamically stable  Respiratory status: acceptable and unassisted  Hydration status: balanced  PONV: none     Anesthetic complications: None          Last vitals:  Vitals:    07/13/17 1750 07/13/17 1800 07/13/17 1810   BP: 141/65 150/72 144/73   Resp: 25 10 21   Temp:  35.9  C (96.6  F)    SpO2: 100% 100% 100%         Electronically Signed By: Brant Bueno MD  July 13, 2017  6:21 PM

## 2017-07-13 NOTE — ANESTHESIA CARE TRANSFER NOTE
Patient: Shayne Fiore    Procedure(s):  ROBOTIC ASSISTED BILATERAL PELVIC LYMPH NODE DISSECTION, RADICAL PUBIC PROSTATECTOMY NERVE SPARING,  COMPLEX LAPAROSCOPIC LYSIS OF ADHESIONS - Wound Class: II-Clean Contaminated  COMPLEX LYSIS OF ADHESIONS - Wound Class: I-Clean    Diagnosis: PROSTATE CANCER   Diagnosis Additional Information: No value filed.    Anesthesia Type:   General     Note:  Airway :Face Mask  Patient transferred to:PACU  Comments: Pt to PACU, spont resp, alert/responds to verbal stimulus, vss. Report to RN      Vitals: (Last set prior to Anesthesia Care Transfer)    CRNA VITALS  7/13/2017 1559 - 7/13/2017 1635      7/13/2017             Resp Rate (observed): 10    EKG: Sinus bradycardia                Electronically Signed By: YG Spain CRNA  July 13, 2017  4:35 PM

## 2017-07-13 NOTE — BRIEF OP NOTE
[facility]    Brief Operative Note    Pre-operative diagnosis: Prostate cancer  Post-operative diagnosis Prostate cancer  Procedure: Procedure(s):  ROBOTIC ASSISTED BILATERAL PELVIC LYMPH NODE DISSECTION, RADICAL PUBIC PROSTATECTOMY NERVE SPARING,  COMPLEX LAPAROSCOPIC LYSIS OF ADHESIONS - Wound Class: II-Clean Contaminated  COMPLEX LYSIS OF ADHESIONS - Wound Class: I-Clean  Surgeon: Surgeon(s) and Role:     * Bruno Vazquez MD - Primary     * Dolly Granda MD - Assisting  Anesthesia: General   Estimated blood loss: 100 cc  Drains: 15 Hermann, 18 Fr escamilla catheter  Specimens:   ID Type Source Tests Collected by Time Destination   A : PROSTATE AND BILATERAL PELVIC  LYMPHNODES  Tissue Prostate SURGICAL PATHOLOGY EXAM Bruno Vazquez MD 7/13/2017  3:14 PM      Findings:   Significant omental adhesions, otherwise uncomplicated prostatectomy.  Complications: None.  Implants: None.

## 2017-07-13 NOTE — OR NURSING
Upon admin to PACU- pt very lethargic, almost complete non responsive- son is at bedside for interpeter-  airway intact RR 8-10 O2 sats 100% w face mask FiO2- after approx 20 minutes  Suddenly desats to 73-84%- adjust head- attempt to awaken- non responsive- while holding jaw thrust for airway became audibly stridorous- started bagging 100% FiO2 - called Dr Bueno- became analia 40's w possible PAC's and irregular- Admin narcan 0.1 mg IV- eventually pt awakens slightly- follows basic commands- )2 sats 100% w face mask 10 ltres- continue to keep awake and encourage increased resp rate- once more awake and aware- full nuer checks done OX3- good strong  bilat- able to move all on commands- follows directions- continue to monitor. Carson Damon leaves to go to work- carson Ace now at bedside- encouraging pt w pulmonary toilet

## 2017-07-14 DIAGNOSIS — C61 PROSTATE CANCER (H): Primary | ICD-10-CM

## 2017-07-14 LAB
ANION GAP SERPL CALCULATED.3IONS-SCNC: 10 MMOL/L (ref 3–14)
BUN SERPL-MCNC: 14 MG/DL (ref 7–30)
CALCIUM SERPL-MCNC: 8 MG/DL (ref 8.5–10.1)
CHLORIDE SERPL-SCNC: 105 MMOL/L (ref 94–109)
CO2 SERPL-SCNC: 26 MMOL/L (ref 20–32)
CREAT SERPL-MCNC: 0.88 MG/DL (ref 0.66–1.25)
ERYTHROCYTE [DISTWIDTH] IN BLOOD BY AUTOMATED COUNT: 13.2 % (ref 10–15)
GFR SERPL CREATININE-BSD FRML MDRD: 86 ML/MIN/1.7M2
GLUCOSE SERPL-MCNC: 119 MG/DL (ref 70–99)
HCT VFR BLD AUTO: 34.1 % (ref 40–53)
HGB BLD-MCNC: 11.3 G/DL (ref 13.3–17.7)
MCH RBC QN AUTO: 29.2 PG (ref 26.5–33)
MCHC RBC AUTO-ENTMCNC: 33.1 G/DL (ref 31.5–36.5)
MCV RBC AUTO: 88 FL (ref 78–100)
PLATELET # BLD AUTO: 212 10E9/L (ref 150–450)
POTASSIUM SERPL-SCNC: 3.9 MMOL/L (ref 3.4–5.3)
RBC # BLD AUTO: 3.87 10E12/L (ref 4.4–5.9)
SODIUM SERPL-SCNC: 141 MMOL/L (ref 133–144)
WBC # BLD AUTO: 9.5 10E9/L (ref 4–11)

## 2017-07-14 PROCEDURE — 12000000 ZZH R&B MED SURG/OB

## 2017-07-14 PROCEDURE — 80048 BASIC METABOLIC PNL TOTAL CA: CPT | Performed by: UROLOGY

## 2017-07-14 PROCEDURE — 85027 COMPLETE CBC AUTOMATED: CPT | Performed by: UROLOGY

## 2017-07-14 PROCEDURE — 36416 COLLJ CAPILLARY BLOOD SPEC: CPT | Performed by: UROLOGY

## 2017-07-14 PROCEDURE — 25800025 ZZH RX 258: Performed by: UROLOGY

## 2017-07-14 PROCEDURE — S5010 5% DEXTROSE AND 0.45% SALINE: HCPCS | Performed by: UROLOGY

## 2017-07-14 PROCEDURE — 25000132 ZZH RX MED GY IP 250 OP 250 PS 637: Performed by: UROLOGY

## 2017-07-14 PROCEDURE — 25000128 H RX IP 250 OP 636: Performed by: UROLOGY

## 2017-07-14 RX ORDER — HYDRALAZINE HYDROCHLORIDE 20 MG/ML
10 INJECTION INTRAMUSCULAR; INTRAVENOUS EVERY 6 HOURS PRN
Status: DISCONTINUED | OUTPATIENT
Start: 2017-07-14 | End: 2017-07-15 | Stop reason: HOSPADM

## 2017-07-14 RX ADMIN — DOCUSATE SODIUM 100 MG: 100 CAPSULE, LIQUID FILLED ORAL at 20:18

## 2017-07-14 RX ADMIN — SODIUM CHLORIDE: 9 INJECTION, SOLUTION INTRAVENOUS at 04:57

## 2017-07-14 RX ADMIN — KETOROLAC TROMETHAMINE 15 MG: 15 INJECTION, SOLUTION INTRAMUSCULAR; INTRAVENOUS at 23:27

## 2017-07-14 RX ADMIN — ACETAMINOPHEN 975 MG: 325 TABLET, FILM COATED ORAL at 11:28

## 2017-07-14 RX ADMIN — KETOROLAC TROMETHAMINE 15 MG: 15 INJECTION, SOLUTION INTRAMUSCULAR; INTRAVENOUS at 16:07

## 2017-07-14 RX ADMIN — ACETAMINOPHEN 975 MG: 325 TABLET, FILM COATED ORAL at 02:30

## 2017-07-14 RX ADMIN — BACITRACIN ZINC, NEOMYCIN, POLYMYXIN B: 400; 3.5; 5 OINTMENT TOPICAL at 20:18

## 2017-07-14 RX ADMIN — BACITRACIN ZINC, NEOMYCIN, POLYMYXIN B: 400; 3.5; 5 OINTMENT TOPICAL at 11:29

## 2017-07-14 RX ADMIN — KETOROLAC TROMETHAMINE 15 MG: 15 INJECTION, SOLUTION INTRAMUSCULAR; INTRAVENOUS at 09:46

## 2017-07-14 RX ADMIN — DOCUSATE SODIUM 100 MG: 100 CAPSULE, LIQUID FILLED ORAL at 09:47

## 2017-07-14 RX ADMIN — KETOROLAC TROMETHAMINE 15 MG: 15 INJECTION, SOLUTION INTRAMUSCULAR; INTRAVENOUS at 01:15

## 2017-07-14 RX ADMIN — KETOROLAC TROMETHAMINE 15 MG: 15 INJECTION, SOLUTION INTRAMUSCULAR; INTRAVENOUS at 06:13

## 2017-07-14 RX ADMIN — ACETAMINOPHEN 975 MG: 325 TABLET, FILM COATED ORAL at 18:45

## 2017-07-14 RX ADMIN — ONDANSETRON 4 MG: 2 SOLUTION INTRAMUSCULAR; INTRAVENOUS at 16:07

## 2017-07-14 RX ADMIN — DEXTROSE AND SODIUM CHLORIDE: 5; 450 INJECTION, SOLUTION INTRAVENOUS at 09:46

## 2017-07-14 ASSESSMENT — ACTIVITIES OF DAILY LIVING (ADL)
RETIRED_COMMUNICATION: 2-->DIFFICULTY UNDERSTANDING (NOT RELATED TO LANGUAGE BARRIER)
WHICH_OF_THE_ABOVE_FUNCTIONAL_RISKS_HAD_A_RECENT_ONSET_OR_CHANGE?: AMBULATION;TRANSFERRING;COMMUNICATION/SPEECH

## 2017-07-14 NOTE — PROVIDER NOTIFICATION
MD Notification    Notified Person:  MD    Notified Persons Name: Dr. Daley    Notification Date/Time: 07/14/17 @ 0200    Notification Interaction:  Talked with Physician    Purpose of Notification: Crepitus was found throughout patient's chest, abdomen, sides, and posterior rib cage    Orders Received: No orders    Comments: MD stated, crepitus to be expected

## 2017-07-14 NOTE — PROVIDER NOTIFICATION
MD Notification    Notified Person:  MD    Notified Persons Name: Dr. Ferro     Notification Date/Time: 4:22 PM 07/14/17    Notification Interaction:  Talked with Physician    Purpose of Notification: pt had 2 emesis this shift. Persistent hiccups. And hypertension.     Orders Received: do not advance diet. Pull back to clears if nausea. Will make something available PRN for hypertension.     Comments:

## 2017-07-14 NOTE — PLAN OF CARE
Problem: Goal Outcome Summary  Goal: Goal Outcome Summary  Outcome: No Change  Pt arrived from PACU at 1900. BPs elevated, all other VSS. Solomon Islander speaking, A&O. Dangle/stand at bedside w/ Ax2. NPO w/ ice chips- tolerating. Mendez w/ pink tinged out put. No gas, BS hypo. L NATIVIDAD w/ moderate amount of bloody out put. Instructed on IS- needs reinforcement. PCDs only. Family at bedside. Will continue to monitor.

## 2017-07-14 NOTE — PLAN OF CARE
Problem: Goal Outcome Summary  Goal: Goal Outcome Summary  Outcome: No Change  Patient VSS. Albanian speaking. A&O. Assist of 2. NPO w/ ice chips. Oral cares done d/t dry mouth. Mendez output efrain colored. No gas and hypo BS. L NATIVIDAD with moderate output, bloody. Family at bedside (2 sons). LS clear. Creiputs throughout abdomen, chest, and posterior rib cage. Will continue to monitor.

## 2017-07-14 NOTE — PLAN OF CARE
Problem: Goal Outcome Summary  Goal: Goal Outcome Summary  Outcome: No Change  Pt is a&o on full liquid diet, up with 2 assist. Continuous pulse ox. Tongan speaking- signed waiver refusing , family is here who helps interpret. BP was high 184/66 at 1200- has continuously decreased throughout shift 156/49 at 1800. Pt vomited 2x- first was black/ dried blood stringy looking and second was clear, MD notified. Pt c/o hiccups. Gave IV Toradol and Zofran x1. Crepitus over abdomen, upper chest and posterior ribs. Mendez care completed. Walked 3x during shift, pt tolerated well. D/c date pending- continue to monitor.

## 2017-07-14 NOTE — PROGRESS NOTES
SPIRITUAL HEALTH SERVICES Progress Note  [facility]    Pt had requested visit with a Quaker .   visited with pt's son and pt to inform them that attempts to contact [place]  Imam Aki Sotomayor have not been fruitful.  Pt's son said that pt is doing much better and that their needs are being met.  He also mentioned that pt is hard of hearing.   offered brief blessing.     remains available upon request.  No plan for follow-up at this time.                                                                                                                                           Treva Pablo M.Div.  Chaplain Resident  Pager [phone]

## 2017-07-14 NOTE — PROGRESS NOTES
"Urology    No acute events overnight   Minimal pain/discomfort  Some crepitus felt over abdomen, questions from family answered  Denies SOB or chest pain  No nausea or vomiting  Has been NPO  Has not been out of bed yet, but eager to do so    Exam  /48 (BP Location: Left arm)  Temp 97.4  F (36.3  C) (Oral)  Resp 16  Ht 1.854 m (6' 1\")  Wt 63.5 kg (140 lb)  SpO2 99%  BMI 18.47 kg/m2  No acute distress  Unlabored breathing, not on O2  Abdomen soft, nt/nd. Incisions c/d/i  Minimal SSD from drain  Crepitus palpable over abdomen and chest  Urine efrain colored, escamilla secured    Labs  Cr 0.88  WBC 9.5  Hgb 11.3  NATIVIDAD Cr 1.    /250  NATIVIDAD 130/70    Assessment/Plan  67 year old male POD#1 s/p robot-assisted laparoscopic prostatectomy with bilateral LND. Doing well.  - Advanced diet as tolerated to regular (start with clear liquids)  - IS/pulmonary toilet while awake  - MIVF@ 50 cc/hr, NATIVIDAD Cr c/w serum. Continue drain for now. Hgb stable.  - PO pain medications, scheduled acetaminophen, ketorolac, and prn IV hydromorphone  - Ambulate at least TID today. SCDs while in bed.   - Likely discharge tomorrow    Discussed with Dr. Vazquez and Dr. Granda.    Olegario Ferro MD, PGY-5  Urology Resident / [facility]  "

## 2017-07-15 VITALS
BODY MASS INDEX: 18.55 KG/M2 | SYSTOLIC BLOOD PRESSURE: 160 MMHG | HEIGHT: 73 IN | HEART RATE: 71 BPM | OXYGEN SATURATION: 99 % | DIASTOLIC BLOOD PRESSURE: 54 MMHG | TEMPERATURE: 98 F | WEIGHT: 140 LBS | RESPIRATION RATE: 16 BRPM

## 2017-07-15 LAB
ANION GAP SERPL CALCULATED.3IONS-SCNC: 7 MMOL/L (ref 3–14)
BUN SERPL-MCNC: 8 MG/DL (ref 7–30)
CALCIUM SERPL-MCNC: 8 MG/DL (ref 8.5–10.1)
CHLORIDE SERPL-SCNC: 107 MMOL/L (ref 94–109)
CO2 SERPL-SCNC: 28 MMOL/L (ref 20–32)
CREAT SERPL-MCNC: 0.83 MG/DL (ref 0.66–1.25)
ERYTHROCYTE [DISTWIDTH] IN BLOOD BY AUTOMATED COUNT: 13.2 % (ref 10–15)
GFR SERPL CREATININE-BSD FRML MDRD: ABNORMAL ML/MIN/1.7M2
GLUCOSE SERPL-MCNC: 104 MG/DL (ref 70–99)
HCT VFR BLD AUTO: 31 % (ref 40–53)
HGB BLD-MCNC: 10.3 G/DL (ref 13.3–17.7)
MCH RBC QN AUTO: 29.1 PG (ref 26.5–33)
MCHC RBC AUTO-ENTMCNC: 33.2 G/DL (ref 31.5–36.5)
MCV RBC AUTO: 88 FL (ref 78–100)
PLATELET # BLD AUTO: 221 10E9/L (ref 150–450)
POTASSIUM SERPL-SCNC: 3 MMOL/L (ref 3.4–5.3)
RBC # BLD AUTO: 3.54 10E12/L (ref 4.4–5.9)
SODIUM SERPL-SCNC: 142 MMOL/L (ref 133–144)
WBC # BLD AUTO: 6.3 10E9/L (ref 4–11)

## 2017-07-15 PROCEDURE — 25000132 ZZH RX MED GY IP 250 OP 250 PS 637: Performed by: UROLOGY

## 2017-07-15 PROCEDURE — 25000128 H RX IP 250 OP 636: Performed by: UROLOGY

## 2017-07-15 PROCEDURE — 25800025 ZZH RX 258: Performed by: UROLOGY

## 2017-07-15 PROCEDURE — 85027 COMPLETE CBC AUTOMATED: CPT | Performed by: UROLOGY

## 2017-07-15 PROCEDURE — 80048 BASIC METABOLIC PNL TOTAL CA: CPT | Performed by: UROLOGY

## 2017-07-15 PROCEDURE — 36415 COLL VENOUS BLD VENIPUNCTURE: CPT | Performed by: UROLOGY

## 2017-07-15 PROCEDURE — S5010 5% DEXTROSE AND 0.45% SALINE: HCPCS | Performed by: UROLOGY

## 2017-07-15 RX ORDER — CHLORPROMAZINE HYDROCHLORIDE 25 MG/1
25 TABLET, FILM COATED ORAL 3 TIMES DAILY
Status: DISCONTINUED | OUTPATIENT
Start: 2017-07-15 | End: 2017-07-15 | Stop reason: HOSPADM

## 2017-07-15 RX ORDER — ACETAMINOPHEN 325 MG/1
975 TABLET ORAL EVERY 8 HOURS
Qty: 100 TABLET | Refills: 0 | COMMUNITY
Start: 2017-07-15

## 2017-07-15 RX ORDER — POTASSIUM CHLORIDE 1500 MG/1
20-40 TABLET, EXTENDED RELEASE ORAL
Status: DISCONTINUED | OUTPATIENT
Start: 2017-07-15 | End: 2017-07-15 | Stop reason: HOSPADM

## 2017-07-15 RX ORDER — CIPROFLOXACIN 500 MG/1
500 TABLET, FILM COATED ORAL 2 TIMES DAILY
Qty: 28 TABLET | Refills: 0 | Status: SHIPPED | OUTPATIENT
Start: 2017-07-15 | End: 2018-03-20

## 2017-07-15 RX ORDER — POTASSIUM CHLORIDE 1.5 G/1.58G
20-40 POWDER, FOR SOLUTION ORAL
Status: DISCONTINUED | OUTPATIENT
Start: 2017-07-15 | End: 2017-07-15 | Stop reason: HOSPADM

## 2017-07-15 RX ORDER — DOCUSATE SODIUM 100 MG/1
100 CAPSULE, LIQUID FILLED ORAL 2 TIMES DAILY
Qty: 60 CAPSULE | Refills: 0 | Status: SHIPPED | OUTPATIENT
Start: 2017-07-15 | End: 2018-03-20

## 2017-07-15 RX ORDER — NEOMYCIN/BACITRACIN/POLYMYXINB 3.5-400-5K
OINTMENT (GRAM) TOPICAL 2 TIMES DAILY
Qty: 14 G | Refills: 0 | Status: SHIPPED | OUTPATIENT
Start: 2017-07-15

## 2017-07-15 RX ADMIN — KETOROLAC TROMETHAMINE 15 MG: 15 INJECTION, SOLUTION INTRAMUSCULAR; INTRAVENOUS at 04:20

## 2017-07-15 RX ADMIN — ACETAMINOPHEN 975 MG: 325 TABLET, FILM COATED ORAL at 04:21

## 2017-07-15 RX ADMIN — POTASSIUM CHLORIDE 40 MEQ: 1500 TABLET, EXTENDED RELEASE ORAL at 10:37

## 2017-07-15 RX ADMIN — DEXTROSE AND SODIUM CHLORIDE: 5; 450 INJECTION, SOLUTION INTRAVENOUS at 04:26

## 2017-07-15 RX ADMIN — POTASSIUM CHLORIDE 20 MEQ: 1500 TABLET, EXTENDED RELEASE ORAL at 12:31

## 2017-07-15 RX ADMIN — HYDRALAZINE HYDROCHLORIDE 10 MG: 20 INJECTION INTRAMUSCULAR; INTRAVENOUS at 04:32

## 2017-07-15 RX ADMIN — CHLORPROMAZINE HYDROCHLORIDE 25 MG: 25 TABLET, SUGAR COATED ORAL at 11:11

## 2017-07-15 RX ADMIN — DOCUSATE SODIUM 100 MG: 100 CAPSULE, LIQUID FILLED ORAL at 08:39

## 2017-07-15 RX ADMIN — KETOROLAC TROMETHAMINE 15 MG: 15 INJECTION, SOLUTION INTRAMUSCULAR; INTRAVENOUS at 09:45

## 2017-07-15 RX ADMIN — BACITRACIN ZINC, NEOMYCIN, POLYMYXIN B: 400; 3.5; 5 OINTMENT TOPICAL at 08:40

## 2017-07-15 RX ADMIN — ACETAMINOPHEN 975 MG: 325 TABLET, FILM COATED ORAL at 10:37

## 2017-07-15 NOTE — PROGRESS NOTES
"Urology    Hiccups persistent since surgery, resulted in nausea/emesis yesterday evening  Currently tolerating regular diet, denies nausea  Ambulating    Exam  /50 (BP Location: Left arm)  Pulse 71  Temp 97.2  F (36.2  C) (Oral)  Resp 16  Ht 1.854 m (6' 1\")  Wt 63.5 kg (140 lb)  SpO2 99%  BMI 18.47 kg/m2  No acute distress  Unlabored breathing, not on O2  Abdomen soft, nt/nd. Incisions c/d/i  Minimal SSD from drain, drain removed and bandage applied  Crepitus again palpable over abdomen and chest  Urine efrain colored, escamilla secured    Labs  Cr 0.83  Hgb 10.3    UOP 2000/900  NATIVIDAD 105/30    Assessment/Plan  67 year old male POD#2 s/p robot-assisted laparoscopic prostatectomy with bilateral LND. Doing well.  - Regular diet  - IS/pulmonary toilet while awake  - SLIV, NATIVIDAD drain discontinued  - Escamilla teaching  - PO pain medications, scheduled acetaminophen  - Discharge today (midday)    Discussed with Dr. Granda.    Olegario Ferro MD, PGY-5  Urology Resident / [facility]  "

## 2017-07-15 NOTE — DISCHARGE SUMMARY
[facility] Discharge Summary    Patient: Shayne Fiore    MRN: [id number]   : 1950         Date of Admission:  2017  Date of Discharge::  7/15/2017  Admitting Physician:  Dolly Granda  Discharge Physician:  Olegario Ferro             Admission Diagnoses:   Prostate cancer (H)         Discharge Diagnosis:   Prostate cancer (H)         Procedures:   Procedure(s): Robot-assisted laparoscopic prostatectomy with bilateral lymphadenectomy       No other procedures performed during this admission           Discharge Medications:     Current Discharge Medication List      START taking these medications    Details   docusate sodium (COLACE) 100 MG capsule Take 1 capsule (100 mg) by mouth 2 times daily  Qty: 60 capsule, Refills: 0    Associated Diagnoses: Prostate cancer (H)      acetaminophen (TYLENOL) 325 MG tablet Take 3 tablets (975 mg) by mouth every 8 hours  Qty: 100 tablet, Refills: 0    Associated Diagnoses: Prostate cancer (H)      neomycin-bacitracin-polymyxin (NEOSPORIN) 5-400-5000 ointment Apply topically 2 times daily To tip of meatus for discomfort  Qty: 14 g, Refills: 0    Associated Diagnoses: Prostate cancer (H)      ciprofloxacin (CIPRO) 500 MG tablet Take 1 tablet (500 mg) by mouth 2 times daily  Qty: 28 tablet, Refills: 0    Associated Diagnoses: Prostate cancer (H)      oxyCODONE (ROXICODONE) 5 MG IR tablet Take 1 tablet (5 mg) by mouth every 6 hours as needed for moderate to severe pain  Qty: 30 tablet, Refills: 0    Associated Diagnoses: Prostate cancer (H)         CONTINUE these medications which have NOT CHANGED    Details   dorzolamide-timolol (COSOPT) 2-0.5 % ophthalmic solution Place 1 drop Into the left eye 2 times daily  Qty: 1 Bottle, Refills: 11    Associated Diagnoses: Open-angle glaucoma of left eye, unspecified glaucoma stage, unspecified open-angle glaucoma type      Hypromellose (ARTIFICIAL TEARS OP) Apply 1 drop to eye daily as needed                     Consultations:   No consultations were requested during this admission          Brief History of Illness:   Reason for admission requiring a surgical or invasive procedure:   PROSTATE CANCER    The patient underwent the following procedure(s):   Robot-assisted laparoscopic prostatectomy with bilateral lymphadenectomy   There were no immediate complications during this procedure.    Please refer to the full operative summary for details.           Hospital Course:   The patient was admitted to the hospital and underwent the above named procedures.  For specific details, please refer to the dictated operative report in the patient's chart.  In summary, the patient tolerated the procedure well and there were no intraoperative complications.  Following the procedure the patient was admitted to the floor for routine post operative care.      The patient's overall hospital course was uneventful, and they remained hemodynamically stable, and afebrile for the hospital stay.  Diet was able to be advanced as tolerated to a regular diet, which was tolerated well before discharge. Labs also remained stable.  NATIVIDAD drain was removed prior to discharge.          Discharge Instructions and Follow-Up:   Activity   - No strenuous exercise for 6 weeks.   - No lifting, pushing, pulling more than 10 pounds for 6 weeks.   - Do not strain with bowel movements.   - Do not drive until you can press the brake pedal quickly and fully without pain.   - Do not operate a motor vehicle while taking narcotic pain medications.     - Protect your catheter at all times; do not let it get tugged or pulled on by furniture, clothing, or other items. Keep it secured to your leg at all times and treat it like an extension of your body. Some blood in your urine may be normal. Stay well hydrated to keep your urine as clear as possible. If your catheter gets dislodged for any reason, only a Urologist should replace it.     Incisions   - You may shower and get  "incisions wet starting 48 hrs after surgery.   - Do not scrub incisions or submerge wounds for 2 weeks or until seen in follow-up.   - Remove wound dressing 48 hours after surgery.   - Avoid applying any lotions or ointments.   - Leave incision open to air. Cover with gauze only if needed for comfort or to protect clothing from drainage.   - The stitches do not need to be removed, they will dissolve on their own.     Medications   - Transition from narcotic pain medications to tylenol (acetaminophen) as you are able.  Wean yourself off all pain medications as you are able.   - Some pain medications contain both tylenol (acetaminophen) and a narcotic (Norco, vicodin, percocet), do not take more than 4,000mg of Tylenol (acetaminophen) from all sources in any 24 hour period.   - Narcotics can make you constipated.  Take over the counter fiber (metamucil or benefiber) and stool softeners (miralax, docusate or senna) while taking narcotic pain medications, but stop if you develop diarrhea.   - No driving or operating machinery while taking narcotic pain medications     Follow-Up:   - In 2 weeks for removal of your escamilla catheter with Dr. Vazquez/Jesus Alberto   - Call your primary care provider to touch base regarding your recent admission.     - Call or return sooner than your regularly scheduled visit if you develop any of the following: fever (greater than 101.5), uncontrolled pain, uncontrolled nausea or vomiting, as well as increased redness, swelling, or drainage from your wound.     Phone numbers:   - Monday through Friday 8am to 4:30pm: Call [phone] with questions or to schedule or confirm appointment.     - Nights or weekends: call the after hours emergency pager - [phone] and tell the  \"I would like to page the Urology Resident on call.\"   - For emergencies, call [phone]          Discharge Disposition:   Discharged to home      Olegario Ferro MD  Urology Resident, PGY-5  July 15, 2017     "

## 2017-07-15 NOTE — PLAN OF CARE
Problem: Goal Outcome Summary  Goal: Goal Outcome Summary  Outcome: Adequate for Discharge Date Met:  07/15/17  Pt a&o. Up with one. Blind. [place] Islander speaking. Pt's son is here translating. Denies pain and nausea. Lap sites CDI. NATIVIDAD removed by MD. Tolerating a regular diet. Potassium replaced prior to d/c. Mendez teaching completed with pt's family. D/c paperwork completed, family verbalized understanding. IV removed. D/c medications filled and sent with pt. Pt d/c via wheel chair home with family. Belongings sent with pt.

## 2017-07-15 NOTE — PLAN OF CARE
Problem: Goal Outcome Summary  Goal: Goal Outcome Summary  Outcome: No Change  Pt is AxO, VS- BP elevated with hydrazine given x1, other VSS, IVF infusing, tolearting full liquid diet, up with 1-2 assist. Continuous pulse ox. Citizen of [place] speaking- signed waiver refusing , family is here who helps interpret. Denies nausea. Crepitus over abdomen, upper chest and posterior ribs. Mendez care completed and education materials and home kit given to sons to review. Walked 1x during shift, pt tolerated well. Plan is possible dc to home as early at 7/15/17, nursing will continue to monitor.

## 2017-07-17 ENCOUNTER — TELEPHONE (OUTPATIENT)
Dept: UROLOGY | Facility: CLINIC | Age: 67
End: 2017-07-17

## 2017-07-17 NOTE — TELEPHONE ENCOUNTER
Patients son called again regarding symptoms his dad is having after surgery.  His surgery was performed by dr swian and dr cain is seeing him in follow up  He wants to speak to her ASAP  She is in surgery and I called her to respond. Kami Fallon LPN Staff Nurse

## 2017-07-17 NOTE — TELEPHONE ENCOUNTER
Pt son calling to report that the pt is experiencing: consistent hiccups that will not go away, SoB and discomfort when laying on his back, better on his side. They are also concerned that he is not having BMs yet. I called down and spoke with Dr Vazquez who recommends that the pt either sees his PMD today, or go to the ER for evaluation. The concern being his SoB and discomfort when laying on his back. Son will take him in today. Lydia Gaxiola,A

## 2017-07-18 ENCOUNTER — OFFICE VISIT (OUTPATIENT)
Dept: UROLOGY | Facility: CLINIC | Age: 67
End: 2017-07-18
Payer: COMMERCIAL

## 2017-07-18 ENCOUNTER — TELEPHONE (OUTPATIENT)
Dept: NEPHROLOGY | Facility: CLINIC | Age: 67
End: 2017-07-18

## 2017-07-18 ENCOUNTER — HOSPITAL ENCOUNTER (OUTPATIENT)
Dept: GENERAL RADIOLOGY | Facility: CLINIC | Age: 67
End: 2017-07-18
Attending: UROLOGY
Payer: COMMERCIAL

## 2017-07-18 ENCOUNTER — HOSPITAL ENCOUNTER (OUTPATIENT)
Dept: GENERAL RADIOLOGY | Facility: CLINIC | Age: 67
Discharge: HOME | End: 2017-07-18
Attending: UROLOGY | Admitting: UROLOGY
Payer: COMMERCIAL

## 2017-07-18 DIAGNOSIS — C61 MALIGNANT NEOPLASM OF PROSTATE (H): Primary | ICD-10-CM

## 2017-07-18 DIAGNOSIS — C61 MALIGNANT NEOPLASM OF PROSTATE (H): ICD-10-CM

## 2017-07-18 LAB
ERYTHROCYTE [DISTWIDTH] IN BLOOD BY AUTOMATED COUNT: 13.4 % (ref 10–15)
HCT VFR BLD AUTO: 31.7 % (ref 40–53)
HGB BLD-MCNC: 10.1 G/DL (ref 13.3–17.7)
MCH RBC QN AUTO: 29.3 PG (ref 26.5–33)
MCHC RBC AUTO-ENTMCNC: 31.9 G/DL (ref 31.5–36.5)
MCV RBC AUTO: 92 FL (ref 78–100)
PLATELET # BLD AUTO: 307 10E9/L (ref 150–450)
RBC # BLD AUTO: 3.45 10E12/L (ref 4.4–5.9)
WBC # BLD AUTO: 5.9 10E9/L (ref 4–11)

## 2017-07-18 PROCEDURE — 99024 POSTOP FOLLOW-UP VISIT: CPT | Performed by: UROLOGY

## 2017-07-18 PROCEDURE — 36415 COLL VENOUS BLD VENIPUNCTURE: CPT | Performed by: UROLOGY

## 2017-07-18 PROCEDURE — 85027 COMPLETE CBC AUTOMATED: CPT | Performed by: UROLOGY

## 2017-07-18 PROCEDURE — 71020 XR CHEST 2 VW: CPT

## 2017-07-18 PROCEDURE — 80048 BASIC METABOLIC PNL TOTAL CA: CPT | Performed by: UROLOGY

## 2017-07-18 PROCEDURE — 74020 XR ABDOMEN 2 VW: CPT

## 2017-07-18 RX ORDER — ONDANSETRON 8 MG/1
8 TABLET, ORALLY DISINTEGRATING ORAL EVERY 8 HOURS PRN
Qty: 12 TABLET | Refills: 0 | Status: SHIPPED | OUTPATIENT
Start: 2017-07-18 | End: 2018-03-20

## 2017-07-18 ASSESSMENT — PAIN SCALES - GENERAL: PAINLEVEL: MILD PAIN (3)

## 2017-07-18 NOTE — TELEPHONE ENCOUNTER
Called patients son again about his post op symptoms  He had a small bowel movement yesterday and he still having hiccups   Spoke with payton he can be seen on Wednesday if he wants. Kami Fallon LPN Staff Nurse

## 2017-07-18 NOTE — LETTER
7/18/2017      RE: Shayne Fiore  [address]   [facility] [address]       UROLOGIC DIAGNOSES:       CURRENT INTERVENTIONS:       HISTORY:     Patient presents for follow up after RALP on 07/13/2017.   Patient noted to have significant adhesions on RALP. Otherwise unremarkable.     Patient presents with persistent hiccups since the immediate post op period.   Notes that he has had some nausea associated with the hiccups.     Obtained CXR and AXR today and these were unremarkable.   Patient with intermittent hiccups in the office.           PAST MEDICAL HISTORY:   Past Medical History:   Diagnosis Date     Prostate cancer (H) 02/2017       PAST SURGICAL HISTORY:   Past Surgical History:   Procedure Laterality Date     CATARACT IOL, RT/LT Right 02/15/2017    IOL explantation/ant vit/ACIOL     DAVINCI PROSTATECTOMY, LYMPHADENECTOMY N/A 7/13/2017    Procedure: DAVINCI PROSTATECTOMY, LYMPHADENECTOMY;  ROBOTIC ASSISTED BILATERAL PELVIC LYMPH NODE DISSECTION, RADICAL PUBIC PROSTATECTOMY NERVE SPARING,  COMPLEX LAPAROSCOPIC LYSIS OF ADHESIONS;  Surgeon: Bruno Vazquez MD;  Location: [facility] OR     EYE SURGERY Right     x 2, unsure what type of surgery     LAPAROSCOPIC LYSIS ADHESIONS N/A 7/13/2017    Procedure: LAPAROSCOPIC LYSIS ADHESIONS;  COMPLEX LYSIS OF ADHESIONS;  Surgeon: Bruno Vazquez MD;  Location: [facility] OR     scrotal hematoma Right      TESTICLE SURGERY         FAMILY HISTORY:   Family History   Problem Relation Age of Onset     Glaucoma Sister      Glaucoma Cousin      Macular Degeneration No family hx of        SOCIAL HISTORY:   Social History   Substance Use Topics     Smoking status: Never Smoker     Smokeless tobacco: Never Used     Alcohol use Not on file       Current Outpatient Prescriptions   Medication     magnesium hydroxide (MILK OF MAGNESIA) 400 MG/5ML suspension     ondansetron (ZOFRAN-ODT) 8 MG ODT tab     docusate sodium (COLACE) 100 MG capsule     acetaminophen  (TYLENOL) 325 MG tablet     neomycin-bacitracin-polymyxin (NEOSPORIN) 5-400-5000 ointment     ciprofloxacin (CIPRO) 500 MG tablet     oxyCODONE (ROXICODONE) 5 MG IR tablet     dorzolamide-timolol (COSOPT) 2-0.5 % ophthalmic solution     Hypromellose (ARTIFICIAL TEARS OP)     No current facility-administered medications for this visit.          PHYSICAL EXAM:    There were no vitals taken for this visit.    HEENT: Normocephalic and atraumatic   Cardiac: Not done  Back/Flank: Not done  CNS/PNS: Not done  Respiratory: Normal non-labored breathing  Abdomen: Soft nontender, some fullness noted, incisions healing well   Peripheral Vascular: Not done  Mental Status: Not done    Penis: Not done  Scrotal Skin: Not done  Testicles: Not done  Epididymis: Not done  Digital Rectal Exam:     Cystoscopy: Not done    Imaging: None    Urinalysis: UA RESULTS:  Recent Labs   Lab Test  04/20/17   1336  12/13/16   1700   COLOR  Yellow  Yellow   APPEARANCE  Clear  Slightly Cloudy   URINEGLC  Negative  Negative   URINEBILI  Negative  Negative   URINEKETONE  Negative  Negative   SG  1.020  1.018   UBLD  Small*  Small*   URINEPH  5.5  5.0   PROTEIN  Negative  Negative   UROBILINOGEN  0.2   --    NITRITE  Negative  Negative   LEUKEST  Negative  Trace*   RBCU   --   <1   WBCU   --   2       PSA:     Post Void Residual:     Other labs: None today      IMPRESSION:  68 y/o M s/p RALP     PLAN:  Discussed possible admission for observation but family would like to consider home management   Will continue with small meals   Monitor urine output via escamilla   Continue MOM   Short course of zofran  Will contact office tomorrow with an update.       Total Time: 15 minutes                                      Total in Consultation: greater than 50%       Dolly Granda MD

## 2017-07-18 NOTE — MR AVS SNAPSHOT
After Visit Summary   7/18/2017    Shayne Fiore    MRN: [id number]           Patient Information     Date Of Birth          1950        Visit Information        Provider Department      7/18/2017 1:30 PM Dolly Granda MD [facility] [place]        Today's Diagnoses     Malignant neoplasm of prostate (H)    -  1       Follow-ups after your visit        Your next 10 appointments already scheduled     Jul 25, 2017  1:15 PM CDT   Return Visit with Dolly Granda MD   [facility])    [address]  [address]  [facility] [address]   [phone]            Aug 01, 2017  1:00 PM CDT   Programmable Hearing Aid Check with Pravin Moran   [facility] ([facility])    [address]  [address] [facility] [address] [phone]            Oct 17, 2017  2:15 PM CDT   RETURN CORNEA with Isai Varma MD   [facility] ([facility])    [address]  [address] [address]  [facility] [address] [phone]              Future tests that were ordered for you today     Open Future Orders        Priority Expected Expires Ordered    XR Abdomen 2 Views STAT 7/18/2017 7/18/2018 7/18/2017    X-Ray Chest 2 Views STAT 7/18/2017 7/18/2018 7/18/2017            Who to contact     If you have questions or need follow up information about today's clinic visit or your schedule please contact [facility] directly at [phone].  Normal or non-critical lab and imaging results will be communicated to you by MyChart, letter or phone within 4 business days after the clinic has received the results. If you do not hear from us within 7 days, please contact the clinic through MyChart or phone. If you have a critical or abnormal lab result, we will notify you by phone as soon as  "possible.  Submit refill requests through biNu or call your pharmacy and they will forward the refill request to us. Please allow 3 business days for your refill to be completed.          Additional Information About Your Visit        biNu Information     biNu lets you send messages to your doctor, view your test results, renew your prescriptions, schedule appointments and more. To sign up, go to [email or web address] . Click on \"Log in\" on the left side of the screen, which will take you to the Welcome page. Then click on \"Sign up Now\" on the right side of the page.     You will be asked to enter the access code listed below, as well as some personal information. Please follow the directions to create your username and password.     Your access code is: [email or web address]  Expires: 2017 10:39 AM     Your access code will  in 90 days. If you need help or a new code, please call your [place] clinic or [phone].        Care EveryWhere ID     This is your Care EveryWhere ID. This could be used by other organizations to access your [place] medical records  [id number]         Blood Pressure from Last 3 Encounters:   07/15/17 160/54   17 158/76   17 156/66    Weight from Last 3 Encounters:   17 63.5 kg (140 lb)   17 62.1 kg (137 lb)   17 62.1 kg (137 lb)              We Performed the Following     Basic metabolic panel     CBC with platelets          Today's Medication Changes          These changes are accurate as of: 17  6:44 PM.  If you have any questions, ask your nurse or doctor.               Start taking these medicines.        Dose/Directions    ondansetron 8 MG ODT tab   Commonly known as:  ZOFRAN-ODT   Used for:  Malignant neoplasm of prostate (H)   Started by:  Dolly Granda MD        Dose:  8 mg   Take 1 tablet (8 mg) by mouth every 8 hours as needed for nausea   Quantity:  12 tablet   Refills:  0            Where to get your medicines    "   These medications were sent to [id number] - [place] [[place]], [place] - [address]  [address] [facility] [address]     Phone:  [phone]     ondansetron 8 MG ODT tab                Primary Care Provider Office Phone # Fax #    Juani Choudhary [phone]       [place] [address]        Equal Access to Services     RACHELL BONDS : Hadii aad ku hadasho Soomaali, waaxda luqadaha, qaybta kaalmada adeegyada, waxay idiin hayaan adeeg kharash la'aan . So [facility] [phone].    ATENCIÓN: Si habla español, tiene a dillon disposición servicios gratuitos de asistencia lingüística. [facility] [phone].    We comply with applicable federal civil rights laws and [place] laws. We do not discriminate on the basis of race, color, national origin, age, disability sex, sexual orientation or gender identity.            Thank you!     Thank you for choosing [facility] [place]  for your care. Our goal is always to provide you with excellent care. Hearing back from our patients is one way we can continue to improve our services. Please take a few minutes to complete the written survey that you may receive in the mail after your visit with us. Thank you!             Your Updated Medication List - Protect others around you: Learn how to safely use, store and throw away your medicines at www.disposemymeds.org.          This list is accurate as of: 7/18/17  6:44 PM.  Always use your most recent med list.                   Brand Name Dispense Instructions for use Diagnosis    acetaminophen 325 MG tablet    TYLENOL    100 tablet    Take 3 tablets (975 mg) by mouth every 8 hours    Prostate cancer (H)       ARTIFICIAL TEARS OP      Apply 1 drop to eye daily as needed        ciprofloxacin 500 MG tablet    CIPRO    28 tablet    Take 1 tablet (500 mg) by mouth 2 times daily    Prostate cancer (H)       docusate  sodium 100 MG capsule    COLACE    60 capsule    Take 1 capsule (100 mg) by mouth 2 times daily    Prostate cancer (H)       dorzolamide-timolol 2-0.5 % ophthalmic solution    COSOPT    1 Bottle    Place 1 drop Into the left eye 2 times daily    Open-angle glaucoma of left eye, unspecified glaucoma stage, unspecified open-angle glaucoma type       magnesium hydroxide 400 MG/5ML suspension    MILK OF MAGNESIA     Take 30 mLs by mouth daily as needed for constipation or heartburn        neomycin-bacitracin-polymyxin 5-400-5000 ointment     14 g    Apply topically 2 times daily To tip of meatus for discomfort    Prostate cancer (H)       ondansetron 8 MG ODT tab    ZOFRAN-ODT    12 tablet    Take 1 tablet (8 mg) by mouth every 8 hours as needed for nausea    Malignant neoplasm of prostate (H)       oxyCODONE 5 MG IR tablet    ROXICODONE    30 tablet    Take 1 tablet (5 mg) by mouth every 6 hours as needed for moderate to severe pain    Prostate cancer (H)

## 2017-07-18 NOTE — PROGRESS NOTES
UROLOGIC DIAGNOSES:       CURRENT INTERVENTIONS:       HISTORY:     Patient presents for follow up after RALP on 07/13/2017.   Patient noted to have significant adhesions on RALP. Otherwise unremarkable.     Patient presents with persistent hiccups since the immediate post op period.   Notes that he has had some nausea associated with the hiccups.     Obtained CXR and AXR today and these were unremarkable.   Patient with intermittent hiccups in the office.           PAST MEDICAL HISTORY:   Past Medical History:   Diagnosis Date     Prostate cancer (H) 02/2017       PAST SURGICAL HISTORY:   Past Surgical History:   Procedure Laterality Date     CATARACT IOL, RT/LT Right 02/15/2017    IOL explantation/ant vit/ACIOL     DAVINCI PROSTATECTOMY, LYMPHADENECTOMY N/A 7/13/2017    Procedure: DAVINCI PROSTATECTOMY, LYMPHADENECTOMY;  ROBOTIC ASSISTED BILATERAL PELVIC LYMPH NODE DISSECTION, RADICAL PUBIC PROSTATECTOMY NERVE SPARING,  COMPLEX LAPAROSCOPIC LYSIS OF ADHESIONS;  Surgeon: Bruno Vazquez MD;  Location: [facility]     EYE SURGERY Right     x 2, unsure what type of surgery     LAPAROSCOPIC LYSIS ADHESIONS N/A 7/13/2017    Procedure: LAPAROSCOPIC LYSIS ADHESIONS;  COMPLEX LYSIS OF ADHESIONS;  Surgeon: Bruno Vazquez MD;  Location: [facility] OR     scrotal hematoma Right      TESTICLE SURGERY         FAMILY HISTORY:   Family History   Problem Relation Age of Onset     Glaucoma Sister      Glaucoma Cousin      Macular Degeneration No family hx of        SOCIAL HISTORY:   Social History   Substance Use Topics     Smoking status: Never Smoker     Smokeless tobacco: Never Used     Alcohol use Not on file       Current Outpatient Prescriptions   Medication     magnesium hydroxide (MILK OF MAGNESIA) 400 MG/5ML suspension     ondansetron (ZOFRAN-ODT) 8 MG ODT tab     docusate sodium (COLACE) 100 MG capsule     acetaminophen (TYLENOL) 325 MG tablet     neomycin-bacitracin-polymyxin (NEOSPORIN) 5-400-5000 ointment      ciprofloxacin (CIPRO) 500 MG tablet     oxyCODONE (ROXICODONE) 5 MG IR tablet     dorzolamide-timolol (COSOPT) 2-0.5 % ophthalmic solution     Hypromellose (ARTIFICIAL TEARS OP)     No current facility-administered medications for this visit.          PHYSICAL EXAM:    There were no vitals taken for this visit.    HEENT: Normocephalic and atraumatic   Cardiac: Not done  Back/Flank: Not done  CNS/PNS: Not done  Respiratory: Normal non-labored breathing  Abdomen: Soft nontender, some fullness noted, incisions healing well   Peripheral Vascular: Not done  Mental Status: Not done    Penis: Not done  Scrotal Skin: Not done  Testicles: Not done  Epididymis: Not done  Digital Rectal Exam:     Cystoscopy: Not done    Imaging: None    Urinalysis: UA RESULTS:  Recent Labs   Lab Test  04/20/17   1336  12/13/16   1700   COLOR  Yellow  Yellow   APPEARANCE  Clear  Slightly Cloudy   URINEGLC  Negative  Negative   URINEBILI  Negative  Negative   URINEKETONE  Negative  Negative   SG  1.020  1.018   UBLD  Small*  Small*   URINEPH  5.5  5.0   PROTEIN  Negative  Negative   UROBILINOGEN  0.2   --    NITRITE  Negative  Negative   LEUKEST  Negative  Trace*   RBCU   --   <1   WBCU   --   2       PSA:     Post Void Residual:     Other labs: None today      IMPRESSION:  66 y/o M s/p RALP     PLAN:  Discussed possible admission for observation but family would like to consider home management   Will continue with small meals   Monitor urine output via escamilla   Continue MOM   Short course of zofran  Will contact office tomorrow with an update.       Total Time: 15 minutes                                      Total in Consultation: greater than 50%

## 2017-07-18 NOTE — LETTER
7/18/2017       RE: Shayne Fiore  [address]   [facility] [address]     Dear Colleague,    Thank you for referring your patient, Shayne Fiore, to the [facility] at [facility]. Please see a copy of my visit note below.    UROLOGIC DIAGNOSES:       CURRENT INTERVENTIONS:       HISTORY:     Patient presents for follow up after RALP on 07/13/2017.   Patient noted to have significant adhesions on RALP. Otherwise unremarkable.     Patient presents with persistent hiccups since the immediate post op period.   Notes that he has had some nausea associated with the hiccups.     Obtained CXR and AXR today and these were unremarkable.   Patient with intermittent hiccups in the office.           PAST MEDICAL HISTORY:   Past Medical History:   Diagnosis Date     Prostate cancer (H) 02/2017       PAST SURGICAL HISTORY:   Past Surgical History:   Procedure Laterality Date     CATARACT IOL, RT/LT Right 02/15/2017    IOL explantation/ant vit/ACIOL     DAVINCI PROSTATECTOMY, LYMPHADENECTOMY N/A 7/13/2017    Procedure: DAVINCI PROSTATECTOMY, LYMPHADENECTOMY;  ROBOTIC ASSISTED BILATERAL PELVIC LYMPH NODE DISSECTION, RADICAL PUBIC PROSTATECTOMY NERVE SPARING,  COMPLEX LAPAROSCOPIC LYSIS OF ADHESIONS;  Surgeon: Bruno Vazquez MD;  Location: [facility] OR     EYE SURGERY Right     x 2, unsure what type of surgery     LAPAROSCOPIC LYSIS ADHESIONS N/A 7/13/2017    Procedure: LAPAROSCOPIC LYSIS ADHESIONS;  COMPLEX LYSIS OF ADHESIONS;  Surgeon: Bruno Vazquez MD;  Location: [facility] OR     scrotal hematoma Right      TESTICLE SURGERY         FAMILY HISTORY:   Family History   Problem Relation Age of Onset     Glaucoma Sister      Glaucoma Cousin      Macular Degeneration No family hx of        SOCIAL HISTORY:   Social History   Substance Use Topics     Smoking status: Never Smoker     Smokeless tobacco: Never Used     Alcohol use  Not on file       Current Outpatient Prescriptions   Medication     magnesium hydroxide (MILK OF MAGNESIA) 400 MG/5ML suspension     ondansetron (ZOFRAN-ODT) 8 MG ODT tab     docusate sodium (COLACE) 100 MG capsule     acetaminophen (TYLENOL) 325 MG tablet     neomycin-bacitracin-polymyxin (NEOSPORIN) 5-400-5000 ointment     ciprofloxacin (CIPRO) 500 MG tablet     oxyCODONE (ROXICODONE) 5 MG IR tablet     dorzolamide-timolol (COSOPT) 2-0.5 % ophthalmic solution     Hypromellose (ARTIFICIAL TEARS OP)     No current facility-administered medications for this visit.          PHYSICAL EXAM:    There were no vitals taken for this visit.    HEENT: Normocephalic and atraumatic   Cardiac: Not done  Back/Flank: Not done  CNS/PNS: Not done  Respiratory: Normal non-labored breathing  Abdomen: Soft nontender, some fullness noted, incisions healing well   Peripheral Vascular: Not done  Mental Status: Not done    Penis: Not done  Scrotal Skin: Not done  Testicles: Not done  Epididymis: Not done  Digital Rectal Exam:     Cystoscopy: Not done    Imaging: None    Urinalysis: UA RESULTS:  Recent Labs   Lab Test  04/20/17   1336  12/13/16   1700   COLOR  Yellow  Yellow   APPEARANCE  Clear  Slightly Cloudy   URINEGLC  Negative  Negative   URINEBILI  Negative  Negative   URINEKETONE  Negative  Negative   SG  1.020  1.018   UBLD  Small*  Small*   URINEPH  5.5  5.0   PROTEIN  Negative  Negative   UROBILINOGEN  0.2   --    NITRITE  Negative  Negative   LEUKEST  Negative  Trace*   RBCU   --   <1   WBCU   --   2       PSA:     Post Void Residual:     Other labs: None today      IMPRESSION:  66 y/o M s/p RALP     PLAN:  Discussed possible admission for observation but family would like to consider home management   Will continue with small meals   Monitor urine output via escamilla   Continue MOM   Short course of zofran  Will contact office tomorrow with an update.       Total Time: 15 minutes                                      Total in  Consultation: greater than 50%       Again, thank you for allowing me to participate in the care of your patient.      Sincerely,    Dolly Granda MD

## 2017-07-19 LAB
ANION GAP SERPL CALCULATED.3IONS-SCNC: 5 MMOL/L (ref 3–14)
BUN SERPL-MCNC: 12 MG/DL (ref 7–30)
CALCIUM SERPL-MCNC: 7.9 MG/DL (ref 8.5–10.1)
CHLORIDE SERPL-SCNC: 98 MMOL/L (ref 94–109)
CO2 SERPL-SCNC: 31 MMOL/L (ref 20–32)
CREAT SERPL-MCNC: 0.83 MG/DL (ref 0.66–1.25)
GFR SERPL CREATININE-BSD FRML MDRD: ABNORMAL ML/MIN/1.7M2
GLUCOSE SERPL-MCNC: 136 MG/DL (ref 70–99)
POTASSIUM SERPL-SCNC: 3.9 MMOL/L (ref 3.4–5.3)
SODIUM SERPL-SCNC: 134 MMOL/L (ref 133–144)

## 2017-07-19 NOTE — OP NOTE
Surgeon / Clinician: Bruno Vazquez MD    Surgeon:  Bruno Vazquez MD.    Assistant:    1.  Dolly Granda MD  2.  Olegario Ferro MD    Preoperative Diagnosis:  Prostate cancer.    Postoperative Diagnosis:  Prostate cancer.    Procedure Performed:  Robotic-assisted laparoscopic radical prostatectomy with bilateral pelvic lymph node dissection, laparoscopic lysis of adhesions.    Anesthesia:  General.    Complications:  None.    Estimated Blood Loss:  115 mL.    Indication for Procedure:  Shayne Fiore is a 67-year-old gentleman with an elevated PSA who was found to have a Martin 3+4 prostate cancer on transrectal ultrasound biopsy.  He has been counseled on his treatment options and decided to undergo a robotic-assisted laparoscopic radical prostatectomy today.    Details of the Procedure:  The risks and benefits of the procedure were explained in detail to the patient and informed consent was obtained.  The patient was brought to the operating room and placed supine on the operating room table where he underwent general endotracheal anesthetic. He was then moved into the [facility] and the abdomen was shaved, prepped and draped in the standard sterile fashion.    After an appropriate timeout, I tented up the umbilicus and inserted the Veress needle at the umbilicus; 15 mmHg insufflation was achieved in the abdomen.  I then made an incision above the umbilicus and used the Visiport and port and camera to place a 10 mm Visiport.  I inspected the abdomen.  The patient had extensive adhesions throughout the abdomen.  The omentum and small bowel were adhesed to the anterior abdominal wall.  Where I wanted to place them, all my ports were covered by adhesions except for the left lateral robotic arm port site.  Therefore, I placed this arm first under direct visualization.  I then used a laparoscopic scissors through this site to takedown adhesions carefully.  When I exposed the area where the 2 right-sided  ports would be placed, these were placed and used for further retraction for the lysis of adhesions.  I then under direct visualization placed the AirSeal port to the left side of the camera port under direct visualization.  The patient was then moved to the 25-degree Trendelenburg position and the robot was docked.    The dissection larissa by incising into the posterior peritoneum to identify vas deferens and seminal vesicles on each side.  All 4 structures were cleared of their surrounding tissues and artery to the seminal vesicle was ligated with clips on each side.  When this was completed I took down the bladder by incising the medial umbilical ligaments on each side and the bladder was taken down until the pubic arch was identified.  The endopelvic fascia was cleared off its overlying fat and the superficial vein was taken down using bipolar electrocautery.  We incised the endopelvic fascia on each side to expose the lateral limits of the prostate.  The 2-0 V-Lock suture was then placed twice around the dorsal venous complex for hemostasis.  I then pulled on the Mendez catheter to identify the prostatovesical junction and this was incised anteriorly with monopolar cautery.  I continued on this plane until the bladder neck was exposed.  The bladder neck was incised and Mendez catheter was identified.  I let down the balloon on the Mendez catheter and brought it up through this plane of dissection and used it for retraction purposes.  The posterior bladder neck was then taken down from the posterior base of the prostate and this dissection was continued until I reached my original plane of dissection and identified seminal vesicles and vas deferens.  All 4 structures were brought through the plane of dissection.  I then dissected posteriorly along the prostate to free the posterior prostate.  A nerve sparing procedure was performed on each side by incising lateral into the prostatic fascia and peeling the  neurovascular bundle away from the posterolateral aspect of the prostate on each side.  The pedicles of the prostate were taken down with 2 white clips on each side for hemostasis.  What remained than was the remaining neurovascular bundle which was peeled away from the posterior lateral aspect of the prostate on each side down to the apex of the prostate.  I then looked anteriorly and incised through the dorsal venous complex with electrocautery.  The urethra was identified and dissected out.  I dissected into the urethra anteriorly to expose the Mendez catheter.  The Mendez catheter was pulled back.  I then incised through the urethra posteriorly taking care not to cut the neurovascular bundle.  The prostate was freed and set aside for now.  I then performed a pelvic node dissection on each side by identifying the external iliac vein and dissected free the lymph node packet between vein and obturator nerve. This was ligated with clips on each side.  These were removed from the body and sent for specimen.  I then formed a vesicourethral reanastomosis by running a 3-0 double-arm V-Lock suture beginning at the 6 o'clock position and the 12 o'clock position on each side to perform the reanastomosis.  A new Mendez catheter was placed and the bladder was irrigated.  A Mihai-Funk drain was placed in the left arm port and then the port was removed and this was tied with a silk suture. All instruments had been removed and the robot was de-docked.  The patient was brought back to the supine position.  The supraumbilical incision was increased such that the EndoCatch specimen could come out intact of the incision.  The fascia was closed with interrupted #1 PDS sutures.  Skin was closed with 4-0 Monocryl subarticular stitches and covered with Dermabond.  The Mendez catheter was secured to the patient's leg and the Mihai-Funk drain was placed to bulb suction and the procedure concluded.    The patient tolerated the procedure  well without any complications. He will go to the hospital for overnight monitoring.        Bruno Vazquez MD    D:  07/13/2017 17:04 T:  07/19/2017 10:40  Document:  [id number]\SK

## 2017-07-24 LAB — COPATH REPORT: NORMAL

## 2017-07-25 ENCOUNTER — OFFICE VISIT (OUTPATIENT)
Dept: UROLOGY | Facility: CLINIC | Age: 67
End: 2017-07-25
Payer: COMMERCIAL

## 2017-07-25 VITALS
HEART RATE: 64 BPM | HEIGHT: 73 IN | SYSTOLIC BLOOD PRESSURE: 128 MMHG | WEIGHT: 140 LBS | DIASTOLIC BLOOD PRESSURE: 64 MMHG | BODY MASS INDEX: 18.55 KG/M2

## 2017-07-25 DIAGNOSIS — C61 MALIGNANT NEOPLASM OF PROSTATE (H): Primary | ICD-10-CM

## 2017-07-25 PROCEDURE — 99024 POSTOP FOLLOW-UP VISIT: CPT | Performed by: UROLOGY

## 2017-07-25 ASSESSMENT — PAIN SCALES - GENERAL: PAINLEVEL: NO PAIN (0)

## 2017-07-25 NOTE — NURSING NOTE
Chief Complaint   Patient presents with     Clinic Care Coordination - Follow-up     History of Malignant neoplasm of prostate      Dominique Peralta LPN

## 2017-07-25 NOTE — PROGRESS NOTES
UROLOGIC DIAGNOSES:       CURRENT INTERVENTIONS:       HISTORY:     Patient presents for follow up after RALP on 07/13/2017.   Patient noted to have significant adhesions on RALP. Otherwise unremarkable.     Patient had bothersome hiccups and nausea post op but this has since resolved.   Notes that he feels much better.     Pathology revealed   FINAL DIAGNOSIS:   Prostate and bilateral pelvic lymph nodes, radical prostatectomy and   lymph node biopsy   - Prostatic adenocarcinoma, Marco grade 3+4=7, involving the bilateral   prostate and 30% of tissue volume   -Two pelvic lymph nodes negative for carcinoma (0/2)       PAST MEDICAL HISTORY:   Past Medical History:   Diagnosis Date     Prostate cancer (H) 02/2017       PAST SURGICAL HISTORY:   Past Surgical History:   Procedure Laterality Date     CATARACT IOL, RT/LT Right 02/15/2017    IOL explantation/ant vit/ACIOL     DAVINCI PROSTATECTOMY, LYMPHADENECTOMY N/A 7/13/2017    Procedure: DAVINCI PROSTATECTOMY, LYMPHADENECTOMY;  ROBOTIC ASSISTED BILATERAL PELVIC LYMPH NODE DISSECTION, RADICAL PUBIC PROSTATECTOMY NERVE SPARING,  COMPLEX LAPAROSCOPIC LYSIS OF ADHESIONS;  Surgeon: Bruno Vazquez MD;  Location: [facility] OR     EYE SURGERY Right     x 2, unsure what type of surgery     LAPAROSCOPIC LYSIS ADHESIONS N/A 7/13/2017    Procedure: LAPAROSCOPIC LYSIS ADHESIONS;  COMPLEX LYSIS OF ADHESIONS;  Surgeon: Bruno Vazquez MD;  Location: [facility] OR     scrotal hematoma Right      TESTICLE SURGERY         FAMILY HISTORY:   Family History   Problem Relation Age of Onset     Glaucoma Sister      Glaucoma Cousin      Macular Degeneration No family hx of        SOCIAL HISTORY:   Social History   Substance Use Topics     Smoking status: Never Smoker     Smokeless tobacco: Never Used     Alcohol use Not on file       Current Outpatient Prescriptions   Medication     magnesium hydroxide (MILK OF MAGNESIA) 400 MG/5ML suspension     ondansetron (ZOFRAN-ODT) 8 MG ODT tab      "docusate sodium (COLACE) 100 MG capsule     acetaminophen (TYLENOL) 325 MG tablet     neomycin-bacitracin-polymyxin (NEOSPORIN) 5-400-5000 ointment     ciprofloxacin (CIPRO) 500 MG tablet     oxyCODONE (ROXICODONE) 5 MG IR tablet     dorzolamide-timolol (COSOPT) 2-0.5 % ophthalmic solution     Hypromellose (ARTIFICIAL TEARS OP)     No current facility-administered medications for this visit.          PHYSICAL EXAM:    /64 (BP Location: Right arm)  Pulse 64  Ht 1.854 m (6' 1\")  Wt 63.5 kg (140 lb)  BMI 18.47 kg/m2    HEENT: Normocephalic and atraumatic   Cardiac: Not done  Back/Flank: Not done  CNS/PNS: Not done  Respiratory: Normal non-labored breathing  Abdomen: Soft nontender, some fullness continues but improved, incisions healing well   Peripheral Vascular: Not done  Mental Status: Not done    Penis: Not done  Scrotal Skin: Not done  Testicles: Not done  Epididymis: Not done  Digital Rectal Exam:     Cystoscopy: Not done    Imaging: None    Urinalysis: UA RESULTS:  Recent Labs   Lab Test  04/20/17   1336  12/13/16   1700   COLOR  Yellow  Yellow   APPEARANCE  Clear  Slightly Cloudy   URINEGLC  Negative  Negative   URINEBILI  Negative  Negative   URINEKETONE  Negative  Negative   SG  1.020  1.018   UBLD  Small*  Small*   URINEPH  5.5  5.0   PROTEIN  Negative  Negative   UROBILINOGEN  0.2   --    NITRITE  Negative  Negative   LEUKEST  Negative  Trace*   RBCU   --   <1   WBCU   --   2     Catheter was removed today. Patient was able to void.       PSA:     Post Void Residual:     Other labs: None today      IMPRESSION:  66 y/o M s/p RALP     PLAN:  Patient may increase diet with no food restrictions   PSA in six weeks   Follow up in six weeks       Total Time: 15 minutes                                      Total in Consultation: greater than 50%     Discussed DEEPA post op, pathology results, diet, avoiding constipation   "

## 2017-07-25 NOTE — MR AVS SNAPSHOT
After Visit Summary   7/25/2017    Shayne Fiore    MRN: [id number]           Patient Information     Date Of Birth          1950        Visit Information        Provider Department      7/25/2017 1:00 PM Dolly Granda MD; [employer] [facility]         Follow-ups after your visit        Follow-up notes from your care team     Return in about 6 weeks (around 9/5/2017) for SD PSA .      Your next 10 appointments already scheduled     Aug 01, 2017  1:00 PM CDT   Programmable Hearing Aid Check with Scarlett Sanchez [facility] ([facility])    [address]  4th Floor  [place] [address] [phone]            Sep 19, 2017 10:15 AM CDT   (Arrive by 10:00 AM)   Return Visit with Dolly Granda MD   [facility] ([facility])    [address]  [address]  [facility] [address]   [phone]            Oct 17, 2017  2:15 PM CDT   RETURN CORNEA with Isai Varma MD   Eye Clinic ([facility])    [address]  [address]  [address]  [place] [address] [phone]              Who to contact     If you have questions or need follow up information about today's clinic visit or your schedule please contact [facility] directly at [phone].  Normal or non-critical lab and imaging results will be communicated to you by MyChart, letter or phone within 4 business days after the clinic has received the results. If you do not hear from us within 7 days, please contact the clinic through MyChart or phone. If you have a critical or abnormal lab result, we will notify you by phone as soon as possible.  Submit refill requests through [employer] or call your pharmacy and they will forward the refill request to us. Please allow 3 business days for your  "refill to be completed.          Additional Information About Your Visit        [employer] Information     User Replay lets you send messages to your doctor, view your test results, renew your prescriptions, schedule appointments and more. To sign up, go to [email or web address] . Click on \"Log in\" on the left side of the screen, which will take you to the Welcome page. Then click on \"Sign up Now\" on the right side of the page.     You will be asked to enter the access code listed below, as well as some personal information. Please follow the directions to create your username and password.     Your access code is: [email or web address]  Expires: 2017 10:39 AM     Your access code will  in 90 days. If you need help or a new code, please call your [facility] clinic or [phone].        Care EveryWhere ID     This is your Care EveryWhere ID. This could be used by other organizations to access your [facility] medical records  [id number]        Your Vitals Were     Pulse Height BMI (Body Mass Index)             64 1.854 m (6' 1\") 18.47 kg/m2          Blood Pressure from Last 3 Encounters:   17 128/64   07/15/17 160/54   17 158/76    Weight from Last 3 Encounters:   17 63.5 kg (140 lb)   17 63.5 kg (140 lb)   17 62.1 kg (137 lb)              Today, you had the following     No orders found for display       Primary Care Provider Office Phone # Fax #    Juani SRIKANTH Choudhary [phone]       [facility]  [facility] [address]        Equal Access to Services     [facility] AH: Hadii inder garcia Sochinyere, waaxda luqadaha, qaybta kaalmada iesha, macy jackson. So [facility] [phone].    ATENCIÓN: Si habla español, tiene a dillon disposición servicios gratuitos de asistencia lingüística. Llame al [phone].    We comply with applicable federal civil rights laws and [place] laws. We do not discriminate on the basis of " race, color, national origin, age, disability sex, sexual orientation or gender identity.            Thank you!     Thank you for choosing [facility]  for your care. Our goal is always to provide you with excellent care. Hearing back from our patients is one way we can continue to improve our services. Please take a few minutes to complete the written survey that you may receive in the mail after your visit with us. Thank you!             Your Updated Medication List - Protect others around you: Learn how to safely use, store and throw away your medicines at www.disposemymeds.org.          This list is accurate as of: 7/25/17  2:07 PM.  Always use your most recent med list.                   Brand Name Dispense Instructions for use Diagnosis    acetaminophen 325 MG tablet    TYLENOL    100 tablet    Take 3 tablets (975 mg) by mouth every 8 hours    Prostate cancer (H)       ARTIFICIAL TEARS OP      Apply 1 drop to eye daily as needed        ciprofloxacin 500 MG tablet    CIPRO    28 tablet    Take 1 tablet (500 mg) by mouth 2 times daily    Prostate cancer (H)       docusate sodium 100 MG capsule    COLACE    60 capsule    Take 1 capsule (100 mg) by mouth 2 times daily    Prostate cancer (H)       dorzolamide-timolol 2-0.5 % ophthalmic solution    COSOPT    1 Bottle    Place 1 drop Into the left eye 2 times daily    Open-angle glaucoma of left eye, unspecified glaucoma stage, unspecified open-angle glaucoma type       magnesium hydroxide 400 MG/5ML suspension    MILK OF MAGNESIA     Take 30 mLs by mouth daily as needed for constipation or heartburn        neomycin-bacitracin-polymyxin 5-400-5000 ointment     14 g    Apply topically 2 times daily To tip of meatus for discomfort    Prostate cancer (H)       ondansetron 8 MG ODT tab    ZOFRAN-ODT    12 tablet    Take 1 tablet (8 mg) by mouth every 8 hours as needed for nausea    Malignant neoplasm of prostate (H)       oxyCODONE  5 MG IR tablet    ROXICODONE    30 tablet    Take 1 tablet (5 mg) by mouth every 6 hours as needed for moderate to severe pain    Prostate cancer (H)

## 2017-07-25 NOTE — LETTER
7/25/2017       RE: Shayne Fiore  [address]   [facility] [address]     Dear Colleague,    Thank you for referring your patient, Shayne Fiore, to the [facility] at [facility]. Please see a copy of my visit note below.    UROLOGIC DIAGNOSES:       CURRENT INTERVENTIONS:       HISTORY:     Patient presents for follow up after RALP on 07/13/2017.   Patient noted to have significant adhesions on RALP. Otherwise unremarkable.     Patient had bothersome hiccups and nausea post op but this has since resolved.   Notes that he feels much better.     Pathology revealed   FINAL DIAGNOSIS:   Prostate and bilateral pelvic lymph nodes, radical prostatectomy and   lymph node biopsy   - Prostatic adenocarcinoma, [place] grade 3+4=7, involving the bilateral   prostate and 30% of tissue volume   -Two pelvic lymph nodes negative for carcinoma (0/2)       PAST MEDICAL HISTORY:   Past Medical History:   Diagnosis Date     Prostate cancer (H) 02/2017       PAST SURGICAL HISTORY:   Past Surgical History:   Procedure Laterality Date     CATARACT IOL, RT/LT Right 02/15/2017    IOL explantation/ant vit/ACIOL     DAVINCI PROSTATECTOMY, LYMPHADENECTOMY N/A 7/13/2017    Procedure: DAVINCI PROSTATECTOMY, LYMPHADENECTOMY;  ROBOTIC ASSISTED BILATERAL PELVIC LYMPH NODE DISSECTION, RADICAL PUBIC PROSTATECTOMY NERVE SPARING,  COMPLEX LAPAROSCOPIC LYSIS OF ADHESIONS;  Surgeon: Bruno Vazquez MD;  Location: [facility] OR     EYE SURGERY Right     x 2, unsure what type of surgery     LAPAROSCOPIC LYSIS ADHESIONS N/A 7/13/2017    Procedure: LAPAROSCOPIC LYSIS ADHESIONS;  COMPLEX LYSIS OF ADHESIONS;  Surgeon: Bruno Vazquez MD;  Location: [facility] OR     scrotal hematoma Right      TESTICLE SURGERY         FAMILY HISTORY:   Family History   Problem Relation Age of Onset     Glaucoma Sister      Glaucoma Cousin      Macular Degeneration No  "family hx of        SOCIAL HISTORY:   Social History   Substance Use Topics     Smoking status: Never Smoker     Smokeless tobacco: Never Used     Alcohol use Not on file       Current Outpatient Prescriptions   Medication     magnesium hydroxide (MILK OF MAGNESIA) 400 MG/5ML suspension     ondansetron (ZOFRAN-ODT) 8 MG ODT tab     docusate sodium (COLACE) 100 MG capsule     acetaminophen (TYLENOL) 325 MG tablet     neomycin-bacitracin-polymyxin (NEOSPORIN) 5-400-5000 ointment     ciprofloxacin (CIPRO) 500 MG tablet     oxyCODONE (ROXICODONE) 5 MG IR tablet     dorzolamide-timolol (COSOPT) 2-0.5 % ophthalmic solution     Hypromellose (ARTIFICIAL TEARS OP)     No current facility-administered medications for this visit.          PHYSICAL EXAM:    /64 (BP Location: Right arm)  Pulse 64  Ht 1.854 m (6' 1\")  Wt 63.5 kg (140 lb)  BMI 18.47 kg/m2    HEENT: Normocephalic and atraumatic   Cardiac: Not done  Back/Flank: Not done  CNS/PNS: Not done  Respiratory: Normal non-labored breathing  Abdomen: Soft nontender, some fullness continues but improved, incisions healing well   Peripheral Vascular: Not done  Mental Status: Not done    Penis: Not done  Scrotal Skin: Not done  Testicles: Not done  Epididymis: Not done  Digital Rectal Exam:     Cystoscopy: Not done    Imaging: None    Urinalysis: UA RESULTS:  Recent Labs   Lab Test  04/20/17   1336  12/13/16   1700   COLOR  Yellow  Yellow   APPEARANCE  Clear  Slightly Cloudy   URINEGLC  Negative  Negative   URINEBILI  Negative  Negative   URINEKETONE  Negative  Negative   SG  1.020  1.018   UBLD  Small*  Small*   URINEPH  5.5  5.0   PROTEIN  Negative  Negative   UROBILINOGEN  0.2   --    NITRITE  Negative  Negative   LEUKEST  Negative  Trace*   RBCU   --   <1   WBCU   --   2     Catheter was removed today. Patient was able to void.       PSA:     Post Void Residual:     Other labs: None today      IMPRESSION:  66 y/o M s/p RALP     PLAN:  Patient may increase diet with " no food restrictions   PSA in six weeks   Follow up in six weeks       Total Time: 15 minutes                                      Total in Consultation: greater than 50%     Discussed DEEPA post op, pathology results, diet, avoiding constipation     Again, thank you for allowing me to participate in the care of your patient.      Sincerely,    Dolly Granda MD

## 2017-09-18 DIAGNOSIS — R97.20 ELEVATED PROSTATE SPECIFIC ANTIGEN (PSA): Primary | ICD-10-CM

## 2017-09-19 ENCOUNTER — OFFICE VISIT (OUTPATIENT)
Dept: UROLOGY | Facility: CLINIC | Age: 67
End: 2017-09-19
Payer: COMMERCIAL

## 2017-09-19 VITALS — WEIGHT: 140 LBS | HEIGHT: 74 IN | BODY MASS INDEX: 17.97 KG/M2 | HEART RATE: 50 BPM | OXYGEN SATURATION: 97 %

## 2017-09-19 DIAGNOSIS — R97.20 ELEVATED PROSTATE SPECIFIC ANTIGEN (PSA): ICD-10-CM

## 2017-09-19 DIAGNOSIS — C61 PROSTATE CANCER (H): ICD-10-CM

## 2017-09-19 LAB
ALBUMIN UR-MCNC: NEGATIVE MG/DL
APPEARANCE UR: CLEAR
BILIRUB UR QL STRIP: NEGATIVE
COLOR UR AUTO: YELLOW
GLUCOSE UR STRIP-MCNC: NEGATIVE MG/DL
HGB UR QL STRIP: ABNORMAL
KETONES UR STRIP-MCNC: NEGATIVE MG/DL
LEUKOCYTE ESTERASE UR QL STRIP: NEGATIVE
NITRATE UR QL: NEGATIVE
PH UR STRIP: 5 PH (ref 5–7)
PSA SERPL-MCNC: <0.04 NG/ML (ref 0–4)
SOURCE: ABNORMAL
SP GR UR STRIP: 1.02 (ref 1–1.03)
UROBILINOGEN UR STRIP-ACNC: 0.2 EU/DL (ref 0.2–1)

## 2017-09-19 PROCEDURE — 84153 ASSAY OF PSA TOTAL: CPT | Performed by: UROLOGY

## 2017-09-19 PROCEDURE — 81003 URINALYSIS AUTO W/O SCOPE: CPT | Performed by: UROLOGY

## 2017-09-19 PROCEDURE — 36415 COLL VENOUS BLD VENIPUNCTURE: CPT | Performed by: UROLOGY

## 2017-09-19 PROCEDURE — 99024 POSTOP FOLLOW-UP VISIT: CPT | Performed by: UROLOGY

## 2017-09-19 ASSESSMENT — PAIN SCALES - GENERAL: PAINLEVEL: NO PAIN (0)

## 2017-09-19 NOTE — MR AVS SNAPSHOT
After Visit Summary   9/19/2017    Shayne Fiore    MRN: [id number]           Patient Information     Date Of Birth          1950        Visit Information        Provider Department      9/19/2017 10:00 AM Dolly Granda MD; [employer] [facility] [place]        Today's Diagnoses     Elevated prostate specific antigen (PSA)        Prostate cancer (H)           Follow-ups after your visit        Follow-up notes from your care team     Return in about 3 months (around 12/19/2017) for Sd PSA.      Your next 10 appointments already scheduled     Oct 17, 2017  2:15 PM CDT   RETURN CORNEA with Isai Varma MD   Eye Clinic ([facility])    [address]  [address]  [address]  [facility] [address]   [phone]            Dec 19, 2017 11:00 AM CST   Return Visit with Dolly Granda MD   [facility] ([facility])    [address]  [address]  [facility] [address] [phone]              Who to contact     If you have questions or need follow up information about today's clinic visit or your schedule please contact [facility] [place] directly at [phone].  Normal or non-critical lab and imaging results will be communicated to you by Pharm[employer]hart, letter or phone within 4 business days after the clinic has received the results. If you do not hear from us within 7 days, please contact the clinic through MyChart or phone. If you have a critical or abnormal lab result, we will notify you by phone as soon as possible.  Submit refill requests through [employer] or call your pharmacy and they will forward the refill request to us. Please allow 3 business days for your refill to be completed.          Additional Information About Your Visit        PharmatrophiXharFotofeedback Information     [employer] lets you send messages to your  "doctor, view your test results, renew your prescriptions, schedule appointments and more. To sign up, go to [email or web address] . Click on \"Log in\" on the left side of the screen, which will take you to the Welcome page. Then click on \"Sign up Now\" on the right side of the page.     You will be asked to enter the access code listed below, as well as some personal information. Please follow the directions to create your username and password.     Your access code is: [email or web address]  Expires: 2017 11:22 AM     Your access code will  in 90 days. If you need help or a new code, please call your [facility] clinic or [phone].        Care EveryWhere ID     This is your Care EveryWhere ID. This could be used by other organizations to access your [facility] medical records  [id number]        Your Vitals Were     Pulse Height Pulse Oximetry BMI (Body Mass Index)          50 1.88 m (6' 2\") 97% 17.97 kg/m2         Blood Pressure from Last 3 Encounters:   17 128/64   07/15/17 160/54   17 158/76    Weight from Last 3 Encounters:   17 63.5 kg (140 lb)   17 63.5 kg (140 lb)   17 63.5 kg (140 lb)              We Performed the Following     PSA Diag Urologic Phys     UA without Microscopic        Primary Care Provider Office Phone # Fax #    Juani Groveyayo, SRIKANTH [phone]       [facility] [address]        Equal Access to Services     CHARLEY BONDS AH: Hadii aad ku hadasho Soomaali, waaxda luqadaha, qaybta kaalmada adeegyada, macy jackson. So [facility] [phone].    ATENCIÓN: Si habla español, tiene a dillon disposición servicios gratuitos de asistencia lingüística. Llame al [phone].    We comply with applicable federal civil rights laws and [place] laws. We do not discriminate on the basis of race, color, national origin, age, disability sex, sexual orientation or gender identity.            Thank " you!     Thank you for choosing [facility] [place]  for your care. Our goal is always to provide you with excellent care. Hearing back from our patients is one way we can continue to improve our services. Please take a few minutes to complete the written survey that you may receive in the mail after your visit with us. Thank you!             Your Updated Medication List - Protect others around you: Learn how to safely use, store and throw away your medicines at www.disposemymeds.org.          This list is accurate as of: 9/19/17 11:22 AM.  Always use your most recent med list.                   Brand Name Dispense Instructions for use Diagnosis    acetaminophen 325 MG tablet    TYLENOL    100 tablet    Take 3 tablets (975 mg) by mouth every 8 hours    Prostate cancer (H)       ARTIFICIAL TEARS OP      Apply 1 drop to eye daily as needed        ciprofloxacin 500 MG tablet    CIPRO    28 tablet    Take 1 tablet (500 mg) by mouth 2 times daily    Prostate cancer (H)       docusate sodium 100 MG capsule    COLACE    60 capsule    Take 1 capsule (100 mg) by mouth 2 times daily    Prostate cancer (H)       dorzolamide-timolol 2-0.5 % ophthalmic solution    COSOPT    1 Bottle    Place 1 drop Into the left eye 2 times daily    Open-angle glaucoma of left eye, unspecified glaucoma stage, unspecified open-angle glaucoma type       magnesium hydroxide 400 MG/5ML suspension    MILK OF MAGNESIA     Take 30 mLs by mouth daily as needed for constipation or heartburn        neomycin-bacitracin-polymyxin 5-400-5000 ointment     14 g    Apply topically 2 times daily To tip of meatus for discomfort    Prostate cancer (H)       ondansetron 8 MG ODT tab    ZOFRAN-ODT    12 tablet    Take 1 tablet (8 mg) by mouth every 8 hours as needed for nausea    Malignant neoplasm of prostate (H)       oxyCODONE 5 MG IR tablet    ROXICODONE    30 tablet    Take 1 tablet (5 mg) by mouth every 6 hours as needed for  moderate to severe pain    Prostate cancer (H)

## 2017-09-19 NOTE — LETTER
9/19/2017       RE: Shayne Fiore  [address]   [facility] [address]     Dear Colleague,    Thank you for referring your patient, Shayne Fiore, to the [facility] at [facility]. Please see a copy of my visit note below.    UROLOGIC DIAGNOSES:       CURRENT INTERVENTIONS:       HISTORY:     Patient presents for follow up after RALP on 07/13/2017.   Patient noted to have significant adhesions on RALP. Otherwise unremarkable.     Patient describes incontinence at present up to 3-4 pads per day. Also notes some perineal discomfort but this is steadily improving.      Pathology revealed   FINAL DIAGNOSIS:   Prostate and bilateral pelvic lymph nodes, radical prostatectomy and   lymph node biopsy   - Prostatic adenocarcinoma, [place] grade 3+4=7, involving the bilateral   prostate and 30% of tissue volume   -Two pelvic lymph nodes negative for carcinoma (0/2)       PAST MEDICAL HISTORY:   Past Medical History:   Diagnosis Date     Prostate cancer (H) 02/2017       PAST SURGICAL HISTORY:   Past Surgical History:   Procedure Laterality Date     CATARACT IOL, RT/LT Right 02/15/2017    IOL explantation/ant vit/ACIOL     DAVINCI PROSTATECTOMY, LYMPHADENECTOMY N/A 7/13/2017    Procedure: DAVINCI PROSTATECTOMY, LYMPHADENECTOMY;  ROBOTIC ASSISTED BILATERAL PELVIC LYMPH NODE DISSECTION, RADICAL PUBIC PROSTATECTOMY NERVE SPARING,  COMPLEX LAPAROSCOPIC LYSIS OF ADHESIONS;  Surgeon: Bruno Vazquez MD;  Location[facility]     EYE SURGERY Right     x 2, unsure what type of surgery     LAPAROSCOPIC LYSIS ADHESIONS N/A 7/13/2017    Procedure: LAPAROSCOPIC LYSIS ADHESIONS;  COMPLEX LYSIS OF ADHESIONS;  Surgeon: Bruno Vazquez MD;  Location: [facility] OR     scrotal hematoma Right      TESTICLE SURGERY         FAMILY HISTORY:   Family History   Problem Relation Age of Onset     Glaucoma Sister      Glaucoma Cousin       "Macular Degeneration No family hx of        SOCIAL HISTORY:   Social History   Substance Use Topics     Smoking status: Never Smoker     Smokeless tobacco: Never Used     Alcohol use Not on file       Current Outpatient Prescriptions   Medication     Hypromellose (ARTIFICIAL TEARS OP)     magnesium hydroxide (MILK OF MAGNESIA) 400 MG/5ML suspension     ondansetron (ZOFRAN-ODT) 8 MG ODT tab     docusate sodium (COLACE) 100 MG capsule     acetaminophen (TYLENOL) 325 MG tablet     neomycin-bacitracin-polymyxin (NEOSPORIN) 5-400-5000 ointment     ciprofloxacin (CIPRO) 500 MG tablet     oxyCODONE (ROXICODONE) 5 MG IR tablet     dorzolamide-timolol (COSOPT) 2-0.5 % ophthalmic solution     No current facility-administered medications for this visit.          PHYSICAL EXAM:    Pulse 50  Ht 1.88 m (6' 2\")  Wt 63.5 kg (140 lb)  SpO2 97%  BMI 17.97 kg/m2    HEENT: Normocephalic and atraumatic   Cardiac: Not done  Back/Flank: Not done  CNS/PNS: Not done  Respiratory: Normal non-labored breathing  Abdomen: Soft nontender, incisions healing well   Peripheral Vascular: Not done  Mental Status: Not done    Penis: Not done  Scrotal Skin: Not done  Testicles: Not done  Epididymis: Not done  Digital Rectal Exam:     Cystoscopy: Not done    Imaging: None    Urinalysis: UA RESULTS:  Recent Labs   Lab Test  04/20/17   1336  12/13/16   1700   COLOR  Yellow  Yellow   APPEARANCE  Clear  Slightly Cloudy   URINEGLC  Negative  Negative   URINEBILI  Negative  Negative   URINEKETONE  Negative  Negative   SG  1.020  1.018   UBLD  Small*  Small*   URINEPH  5.5  5.0   PROTEIN  Negative  Negative   UROBILINOGEN  0.2   --    NITRITE  Negative  Negative   LEUKEST  Negative  Trace*   RBCU   --   <1   WBCU   --   2     Catheter was removed today. Patient was able to void.       PSA:     Post Void Residual:     Other labs: None today      IMPRESSION:  68 y/o M s/p RALP     PLAN:  PSA in three months   Araceli as described   Follow up in three months "       Total Time: 15 minutes                                      Total in Consultation: greater than 50%     Discussed DEEPA post op, improving pain, PSA, continued post op improvement     Again, thank you for allowing me to participate in the care of your patient.      Sincerely,    Dolly Granda MD

## 2017-09-19 NOTE — NURSING NOTE
Incision discomfort, espically when he is on the toilet, when having a BM.  Pt states he is not straining.  RUSSELL Schultz, CMA

## 2017-09-20 NOTE — PROGRESS NOTES
UROLOGIC DIAGNOSES:       CURRENT INTERVENTIONS:       HISTORY:     Patient presents for follow up after RALP on 07/13/2017.   Patient noted to have significant adhesions on RALP. Otherwise unremarkable.     Patient describes incontinence at present up to 3-4 pads per day. Also notes some perineal discomfort but this is steadily improving.      Pathology revealed   FINAL DIAGNOSIS:   Prostate and bilateral pelvic lymph nodes, radical prostatectomy and   lymph node biopsy   - Prostatic adenocarcinoma, [place] grade 3+4=7, involving the bilateral   prostate and 30% of tissue volume   -Two pelvic lymph nodes negative for carcinoma (0/2)       PAST MEDICAL HISTORY:   Past Medical History:   Diagnosis Date     Prostate cancer (H) 02/2017       PAST SURGICAL HISTORY:   Past Surgical History:   Procedure Laterality Date     CATARACT IOL, RT/LT Right 02/15/2017    IOL explantation/ant vit/ACIOL     DAVINCI PROSTATECTOMY, LYMPHADENECTOMY N/A 7/13/2017    Procedure: DAVINCI PROSTATECTOMY, LYMPHADENECTOMY;  ROBOTIC ASSISTED BILATERAL PELVIC LYMPH NODE DISSECTION, RADICAL PUBIC PROSTATECTOMY NERVE SPARING,  COMPLEX LAPAROSCOPIC LYSIS OF ADHESIONS;  Surgeon: Bruno Vazquez MD;  Location: [facility]     EYE SURGERY Right     x 2, unsure what type of surgery     LAPAROSCOPIC LYSIS ADHESIONS N/A 7/13/2017    Procedure: LAPAROSCOPIC LYSIS ADHESIONS;  COMPLEX LYSIS OF ADHESIONS;  Surgeon: Bruno Vazquez MD;  Location: [facility] OR     scrotal hematoma Right      TESTICLE SURGERY         FAMILY HISTORY:   Family History   Problem Relation Age of Onset     Glaucoma Sister      Glaucoma Cousin      Macular Degeneration No family hx of        SOCIAL HISTORY:   Social History   Substance Use Topics     Smoking status: Never Smoker     Smokeless tobacco: Never Used     Alcohol use Not on file       Current Outpatient Prescriptions   Medication     Hypromellose (ARTIFICIAL TEARS OP)     magnesium hydroxide (MILK OF MAGNESIA) 400  "MG/5ML suspension     ondansetron (ZOFRAN-ODT) 8 MG ODT tab     docusate sodium (COLACE) 100 MG capsule     acetaminophen (TYLENOL) 325 MG tablet     neomycin-bacitracin-polymyxin (NEOSPORIN) 5-400-5000 ointment     ciprofloxacin (CIPRO) 500 MG tablet     oxyCODONE (ROXICODONE) 5 MG IR tablet     dorzolamide-timolol (COSOPT) 2-0.5 % ophthalmic solution     No current facility-administered medications for this visit.          PHYSICAL EXAM:    Pulse 50  Ht 1.88 m (6' 2\")  Wt 63.5 kg (140 lb)  SpO2 97%  BMI 17.97 kg/m2    HEENT: Normocephalic and atraumatic   Cardiac: Not done  Back/Flank: Not done  CNS/PNS: Not done  Respiratory: Normal non-labored breathing  Abdomen: Soft nontender, incisions healing well   Peripheral Vascular: Not done  Mental Status: Not done    Penis: Not done  Scrotal Skin: Not done  Testicles: Not done  Epididymis: Not done  Digital Rectal Exam:     Cystoscopy: Not done    Imaging: None    Urinalysis: UA RESULTS:  Recent Labs   Lab Test  04/20/17   1336  12/13/16   1700   COLOR  Yellow  Yellow   APPEARANCE  Clear  Slightly Cloudy   URINEGLC  Negative  Negative   URINEBILI  Negative  Negative   URINEKETONE  Negative  Negative   SG  1.020  1.018   UBLD  Small*  Small*   URINEPH  5.5  5.0   PROTEIN  Negative  Negative   UROBILINOGEN  0.2   --    NITRITE  Negative  Negative   LEUKEST  Negative  Trace*   RBCU   --   <1   WBCU   --   2     Catheter was removed today. Patient was able to void.       PSA:     Post Void Residual:     Other labs: None today      IMPRESSION:  68 y/o M s/p RALP     PLAN:  PSA in three months   Araceli as described   Follow up in three months       Total Time: 15 minutes                                      Total in Consultation: greater than 50%     Discussed DEEPA post op, improving pain, PSA, continued post op improvement   "

## 2017-10-17 ENCOUNTER — OFFICE VISIT (OUTPATIENT)
Dept: OPHTHALMOLOGY | Facility: CLINIC | Age: 67
End: 2017-10-17
Attending: OPHTHALMOLOGY
Payer: COMMERCIAL

## 2017-10-17 DIAGNOSIS — H04.123 INSUFFICIENCY OF TEAR FILM OF BOTH EYES: Primary | ICD-10-CM

## 2017-10-17 DIAGNOSIS — H40.10X0 OPEN-ANGLE GLAUCOMA OF LEFT EYE, UNSPECIFIED GLAUCOMA STAGE, UNSPECIFIED OPEN-ANGLE GLAUCOMA TYPE: ICD-10-CM

## 2017-10-17 PROCEDURE — T1013 SIGN LANG/ORAL INTERPRETER: HCPCS | Mod: U3,ZF

## 2017-10-17 PROCEDURE — 99212 OFFICE O/P EST SF 10 MIN: CPT | Mod: ZF

## 2017-10-17 PROCEDURE — 68761 CLOSE TEAR DUCT OPENING: CPT | Mod: ZF | Performed by: OPHTHALMOLOGY

## 2017-10-17 RX ORDER — DORZOLAMIDE HYDROCHLORIDE AND TIMOLOL MALEATE 20; 5 MG/ML; MG/ML
1 SOLUTION/ DROPS OPHTHALMIC 2 TIMES DAILY
Qty: 1 BOTTLE | Refills: 11 | Status: SHIPPED | OUTPATIENT
Start: 2017-10-17

## 2017-10-17 ASSESSMENT — CONF VISUAL FIELD
OS_SUPERIOR_NASAL_RESTRICTION: 1
OD_SUPERIOR_NASAL_RESTRICTION: 3
OD_INFERIOR_TEMPORAL_RESTRICTION: 3
OD_SUPERIOR_TEMPORAL_RESTRICTION: 3
OS_SUPERIOR_TEMPORAL_RESTRICTION: 1
OS_INFERIOR_TEMPORAL_RESTRICTION: 1
OD_INFERIOR_NASAL_RESTRICTION: 3
OS_INFERIOR_NASAL_RESTRICTION: 1

## 2017-10-17 ASSESSMENT — EXTERNAL EXAM - LEFT EYE: OS_EXAM: NORMAL

## 2017-10-17 ASSESSMENT — VISUAL ACUITY
OS_SC: NLP
OD_PH_SC: 20/70-1
OD_SC: 20/125
METHOD: SNELLEN - LINEAR

## 2017-10-17 ASSESSMENT — REFRACTION_MANIFEST
OD_AXIS: 100
OD_ADD: +3.00
OD_SPHERE: -6.50
OS_SPHERE: BALANCE
OD_CYLINDER: +8.00

## 2017-10-17 ASSESSMENT — SLIT LAMP EXAM - LIDS: COMMENTS: NORMAL

## 2017-10-17 ASSESSMENT — EXTERNAL EXAM - RIGHT EYE: OD_EXAM: NORMAL

## 2017-10-17 ASSESSMENT — TONOMETRY
IOP_METHOD: ICARE
OS_IOP_MMHG: 51
OD_IOP_MMHG: 10

## 2017-10-17 NOTE — NURSING NOTE
Chief Complaints and History of Present Illnesses   Patient presents with     Follow Up For      IOL explantation/ant vit/ACIOL right eye (2/15/17)     HPI    Affected eye(s):  Both   Symptoms:        Duration:  4 months   Frequency:  Constant       Do you have eye pain now?:  No      Comments:  Pt. States no change in VA BE.  No c/o comfort BE.  Fidelina House COT 2:28 PM October 17, 2017

## 2017-10-17 NOTE — PROGRESS NOTES
CC: Red and irritated right eye    Right eye is often irritated with FBS.  He has not noted a decline in vision.  Right eye occasionally is painful.  He stopped using cosopt months ago.    Gtts:   cosopt OS twice a day (stopped using)  [place] ointment OD qhs   ATs PRN    A/P:    1. s/p IOL explantation/ant vit/ACIOL right eye (2/15/17)    Dry and irregular surface with chronic discomfort    Vision limited by advanced glaucoma    Right UL / LL plugs today (collagen)  Surface with mild PEE.     IOP too high OS, possibly causing discomfort, restart cosopt BID OS     Continue with ointment qhs and artifical tears throughout the day.     RTC in 4 months, earlier as needed.      ~~~~~~~~~~~~~~~~~~~~~~~~~~~~~~~~~~~~~~~~~~~~~~~~~~~~~~~~~~~~~~~~    Complete documentation of historical and exam elements from today's encounter can be found in the full encounter summary report (not reduplicated in this progress note). I personally obtained the chief complaint(s) and history of present illness.  I confirmed and edited as necessary the review of systems, past medical/surgical history, family history, social history, and examination findings as documented by others.  I examined the patient myself, and I personally reviewed the relevant tests, images, and reports as documented above. I formulated and edited as necessary the assessment and plan and discussed the findings and management plan with the patient and family.     I was personally present for the entirety of the in-office procedure.     Isai Varma MD, MA  Director, Cornea & Anterior Segment  [facility] Department of Ophthalmology & Visual Neuroscience

## 2017-10-17 NOTE — MR AVS SNAPSHOT
After Visit Summary   10/17/2017    Shayne Fiore    MRN: [id number]           Patient Information     Date Of Birth          1950        Visit Information        Provider Department      10/17/2017 2:15 PM Angelica Zapata; sIai Varma MD [facility]        Today's Diagnoses     Insufficiency of tear film of both eyes    -  1    Open-angle glaucoma of left eye, unspecified glaucoma stage, unspecified open-angle glaucoma type           Follow-ups after your visit        Follow-up notes from your care team     Return in about 4 months (around 2/17/2018).      Your next 10 appointments already scheduled     Dec 19, 2017 11:00 AM CST   Return Visit with Dolly Granda MD   [facility] [place] ([facility] [place])    [address]  [address]  [facility] [address]   [phone]            Feb 22, 2018 10:15 AM CST   RETURN CORNEA with Isai Varma MD   Eye Clinic ([facility])    [address]  [address] [facility] [address]  [address] [address]  [facility] [address] [phone]              Who to contact     Please call your clinic at [phone] to:    Ask questions about your health    Make or cancel appointments    Discuss your medicines    Learn about your test results    Speak to your doctor   If you have compliments or concerns about an experience at your clinic, or if you wish to file a complaint, please contact [facility] Patient Relations at [phone] or email us at [email or web address]         Additional Information About Your Visit        MyChart Information     [employer] is an electronic gateway that provides easy, online access to your medical records. With [employer], you can request a clinic appointment, read your test results, renew a prescription or communicate with your care team.     To sign up for [employer] visit the website at [email or web address]   You will be  asked to enter the access code listed below, as well as some personal information. Please follow the directions to create your username and password.     Your access code is: [email or web address]  Expires: 2017 11:22 AM     Your access code will  in 90 days. If you need help or a new code, please contact your [facility] or call [phone] for assistance.        Care EveryWhere ID     This is your Care EveryWhere ID. This could be used by other organizations to access your [place] medical records  [id number]         Blood Pressure from Last 3 Encounters:   17 128/64   07/15/17 160/54   17 158/76    Weight from Last 3 Encounters:   17 63.5 kg (140 lb)   17 63.5 kg (140 lb)   17 63.5 kg (140 lb)              We Performed the Following     Punctal Closure, Plugs          Where to get your medicines      These medications were sent to [facility] [address] [[place] [address]     Phone:  [phone]     dorzolamide-timolol 2-0.5 % ophthalmic solution          Primary Care Provider Office Phone # Fax #    Juani SRIKANTH Choudhary [phone]       [facility]  [facility] [address]        Equal Access to Services     RACHELL [facility] Center[facility]TH[facility] AH: Hadii aad ku hadasho Soomaali, waaxda luqadaha, qaybta kaalmada adeegyada, macy almodovar . So [facility] [phone].    ATENCIÓN: Si habla español, tiene a dillon disposición servicios gratuitos de asistencia lingüística. Llame al [phone].    We comply with applicable federal civil rights laws and [place] laws. We do not discriminate on the basis of race, color, national origin, age, disability, sex, sexual orientation, or gender identity.            Thank you!     Thank you for choosing EYE CLINIC  for your care. Our goal is always to provide you with excellent care. Hearing back from  our patients is one way we can continue to improve our services. Please take a few minutes to complete the written survey that you may receive in the mail after your visit with us. Thank you!             Your Updated Medication List - Protect others around you: Learn how to safely use, store and throw away your medicines at www.disposemymeds.org.          This list is accurate as of: 10/17/17  4:39 PM.  Always use your most recent med list.                   Brand Name Dispense Instructions for use Diagnosis    acetaminophen 325 MG tablet    TYLENOL    100 tablet    Take 3 tablets (975 mg) by mouth every 8 hours    Prostate cancer (H)       ARTIFICIAL TEARS OP      Apply 1 drop to eye daily as needed        ciprofloxacin 500 MG tablet    CIPRO    28 tablet    Take 1 tablet (500 mg) by mouth 2 times daily    Prostate cancer (H)       docusate sodium 100 MG capsule    COLACE    60 capsule    Take 1 capsule (100 mg) by mouth 2 times daily    Prostate cancer (H)       dorzolamide-timolol 2-0.5 % ophthalmic solution    COSOPT    1 Bottle    Place 1 drop Into the left eye 2 times daily    Open-angle glaucoma of left eye, unspecified glaucoma stage, unspecified open-angle glaucoma type       magnesium hydroxide 400 MG/5ML suspension    MILK OF MAGNESIA     Take 30 mLs by mouth daily as needed for constipation or heartburn        neomycin-bacitracin-polymyxin 5-400-5000 ointment     14 g    Apply topically 2 times daily To tip of meatus for discomfort    Prostate cancer (H)       ondansetron 8 MG ODT tab    ZOFRAN-ODT    12 tablet    Take 1 tablet (8 mg) by mouth every 8 hours as needed for nausea    Malignant neoplasm of prostate (H)       oxyCODONE 5 MG IR tablet    ROXICODONE    30 tablet    Take 1 tablet (5 mg) by mouth every 6 hours as needed for moderate to severe pain    Prostate cancer (H)

## 2017-12-07 ENCOUNTER — TELEPHONE (OUTPATIENT)
Dept: OPHTHALMOLOGY | Facility: CLINIC | Age: 67
End: 2017-12-07

## 2017-12-07 NOTE — TELEPHONE ENCOUNTER
----- Message from Denisse Rosenbaum sent at 12/7/2017  2:38 PM CST -----  Regarding: Pt's Glasses RX Needed -  Page  Contact: [phone]  The pt's son is asking for the pt's glasses RX to be mailed to his home.    Please call the pt at [phone] if there are questions.    Thanks - Denisse    Please DO NOT send this message and/or reply back to sender.  Call Center Representatives DO NOT respond to messages.

## 2017-12-19 ENCOUNTER — OFFICE VISIT (OUTPATIENT)
Dept: UROLOGY | Facility: CLINIC | Age: 67
End: 2017-12-19
Payer: COMMERCIAL

## 2017-12-19 VITALS — HEART RATE: 58 BPM | DIASTOLIC BLOOD PRESSURE: 64 MMHG | SYSTOLIC BLOOD PRESSURE: 112 MMHG | HEIGHT: 73 IN

## 2017-12-19 DIAGNOSIS — C61 PROSTATE CANCER (H): Primary | ICD-10-CM

## 2017-12-19 LAB — PSA SERPL-MCNC: <0.04 NG/ML (ref 0–4)

## 2017-12-19 PROCEDURE — 36415 COLL VENOUS BLD VENIPUNCTURE: CPT | Performed by: UROLOGY

## 2017-12-19 PROCEDURE — 99213 OFFICE O/P EST LOW 20 MIN: CPT | Performed by: UROLOGY

## 2017-12-19 PROCEDURE — 84153 ASSAY OF PSA TOTAL: CPT | Performed by: UROLOGY

## 2017-12-19 RX ORDER — SILDENAFIL CITRATE 20 MG/1
TABLET ORAL
Qty: 30 TABLET | Refills: 6 | Status: SHIPPED | OUTPATIENT
Start: 2017-12-19 | End: 2018-03-20

## 2017-12-19 ASSESSMENT — PAIN SCALES - GENERAL: PAINLEVEL: NO PAIN (0)

## 2017-12-19 NOTE — PROGRESS NOTES
UROLOGIC DIAGNOSES:       CURRENT INTERVENTIONS:       HISTORY:     Patient presents for follow up after RALP on 07/13/2017.   Patient noted to have significant adhesions on RALP. Otherwise unremarkable.     Patient describes incontinence with urge to void. Controls this by clamping penis. He does not wear pads at present.   Also notes minimal erectile function. We discussed sildenafil with instructions for use.     Pathology revealed   FINAL DIAGNOSIS:   Prostate and bilateral pelvic lymph nodes, radical prostatectomy and   lymph node biopsy   - Prostatic adenocarcinoma, [place] grade 3+4=7, involving the bilateral   prostate and 30% of tissue volume   -Two pelvic lymph nodes negative for carcinoma (0/2)       PAST MEDICAL HISTORY:   Past Medical History:   Diagnosis Date     Prostate cancer (H) 02/2017       PAST SURGICAL HISTORY:   Past Surgical History:   Procedure Laterality Date     CATARACT IOL, RT/LT Right 02/15/2017    IOL explantation/ant vit/ACIOL     DAVINCI PROSTATECTOMY, LYMPHADENECTOMY N/A 7/13/2017    Procedure: DAVINCI PROSTATECTOMY, LYMPHADENECTOMY;  ROBOTIC ASSISTED BILATERAL PELVIC LYMPH NODE DISSECTION, RADICAL PUBIC PROSTATECTOMY NERVE SPARING,  COMPLEX LAPAROSCOPIC LYSIS OF ADHESIONS;  Surgeon: Bruno Vazquez MD;  Location: [facility] OR     EYE SURGERY Right     x 2, unsure what type of surgery     LAPAROSCOPIC LYSIS ADHESIONS N/A 7/13/2017    Procedure: LAPAROSCOPIC LYSIS ADHESIONS;  COMPLEX LYSIS OF ADHESIONS;  Surgeon: Bruno Vazquez MD;  Location: [facility] OR     scrotal hematoma Right      TESTICLE SURGERY         FAMILY HISTORY:   Family History   Problem Relation Age of Onset     Glaucoma Sister      Glaucoma Cousin      Macular Degeneration No family hx of        SOCIAL HISTORY:   Social History   Substance Use Topics     Smoking status: Never Smoker     Smokeless tobacco: Never Used     Alcohol use Not on file       Current Outpatient Prescriptions   Medication     sildenafil  "(REVATIO) 20 MG tablet     dorzolamide-timolol (COSOPT) 2-0.5 % ophthalmic solution     Hypromellose (ARTIFICIAL TEARS OP)     magnesium hydroxide (MILK OF MAGNESIA) 400 MG/5ML suspension     ondansetron (ZOFRAN-ODT) 8 MG ODT tab     docusate sodium (COLACE) 100 MG capsule     acetaminophen (TYLENOL) 325 MG tablet     neomycin-bacitracin-polymyxin (NEOSPORIN) 5-400-5000 ointment     ciprofloxacin (CIPRO) 500 MG tablet     oxyCODONE (ROXICODONE) 5 MG IR tablet     No current facility-administered medications for this visit.          PHYSICAL EXAM:    /64 (BP Location: Left arm, Patient Position: Sitting, Cuff Size: Adult Regular)  Pulse 58  Ht 1.854 m (6' 1\")    HEENT: Normocephalic and atraumatic   Cardiac: Not done  Back/Flank: Not done  CNS/PNS: Not done  Respiratory: Normal non-labored breathing  Abdomen: Soft nontender, incisions healing well   Peripheral Vascular: Not done  Mental Status: Not done    Penis: Not done  Scrotal Skin: Not done  Testicles: Not done  Epididymis: Not done  Digital Rectal Exam:     Cystoscopy: Not done    Imaging: None    Urinalysis: UA RESULTS:  Recent Labs   Lab Test  04/20/17   1336  12/13/16   1700   COLOR  Yellow  Yellow   APPEARANCE  Clear  Slightly Cloudy   URINEGLC  Negative  Negative   URINEBILI  Negative  Negative   URINEKETONE  Negative  Negative   SG  1.020  1.018   UBLD  Small*  Small*   URINEPH  5.5  5.0   PROTEIN  Negative  Negative   UROBILINOGEN  0.2   --    NITRITE  Negative  Negative   LEUKEST  Negative  Trace*   RBCU   --   <1   WBCU   --   2     Catheter was removed today. Patient was able to void.       PSA:     Post Void Residual:     Other labs: None today      IMPRESSION:  66 y/o M s/p RALP     PLAN:  PSA in three months   Continue Kegels   Rx for sildenafil given           Total Time: 15 minutes                                      Total in Consultation: greater than 50%     Discussed DEEPA post op, erectile dysfunction, and today's PSA   "

## 2017-12-19 NOTE — LETTER
12/19/2017       RE: Shayne Fiore  [address]   [facility] [address]     Dear Colleague,    Thank you for referring your patient, Shayne Fiore, to the [facility] [place] at [facility]. Please see a copy of my visit note below.    UROLOGIC DIAGNOSES:       CURRENT INTERVENTIONS:       HISTORY:     Patient presents for follow up after RALP on 07/13/2017.   Patient noted to have significant adhesions on RALP. Otherwise unremarkable.     Patient describes incontinence with urge to void. Controls this by clamping penis. He does not wear pads at present.   Also notes minimal erectile function. We discussed sildenafil with instructions for use.     Pathology revealed   FINAL DIAGNOSIS:   Prostate and bilateral pelvic lymph nodes, radical prostatectomy and   lymph node biopsy   - Prostatic adenocarcinoma, [place] grade 3+4=7, involving the bilateral   prostate and 30% of tissue volume   -Two pelvic lymph nodes negative for carcinoma (0/2)       PAST MEDICAL HISTORY:   Past Medical History:   Diagnosis Date     Prostate cancer (H) 02/2017       PAST SURGICAL HISTORY:   Past Surgical History:   Procedure Laterality Date     CATARACT IOL, RT/LT Right 02/15/2017    IOL explantation/ant vit/ACIOL     DAVINCI PROSTATECTOMY, LYMPHADENECTOMY N/A 7/13/2017    Procedure: DAVINCI PROSTATECTOMY, LYMPHADENECTOMY;  ROBOTIC ASSISTED BILATERAL PELVIC LYMPH NODE DISSECTION, RADICAL PUBIC PROSTATECTOMY NERVE SPARING,  COMPLEX LAPAROSCOPIC LYSIS OF ADHESIONS;  Surgeon: Bruno Vazquez MD;  Location: [facility]     EYE SURGERY Right     x 2, unsure what type of surgery     LAPAROSCOPIC LYSIS ADHESIONS N/A 7/13/2017    Procedure: LAPAROSCOPIC LYSIS ADHESIONS;  COMPLEX LYSIS OF ADHESIONS;  Surgeon: Bruno Vazquez MD;  Location: [facility] OR     scrotal hematoma Right      TESTICLE SURGERY         FAMILY HISTORY:   Family History   Problem  "Relation Age of Onset     Glaucoma Sister      Glaucoma Cousin      Macular Degeneration No family hx of        SOCIAL HISTORY:   Social History   Substance Use Topics     Smoking status: Never Smoker     Smokeless tobacco: Never Used     Alcohol use Not on file       Current Outpatient Prescriptions   Medication     sildenafil (REVATIO) 20 MG tablet     dorzolamide-timolol (COSOPT) 2-0.5 % ophthalmic solution     Hypromellose (ARTIFICIAL TEARS OP)     magnesium hydroxide (MILK OF MAGNESIA) 400 MG/5ML suspension     ondansetron (ZOFRAN-ODT) 8 MG ODT tab     docusate sodium (COLACE) 100 MG capsule     acetaminophen (TYLENOL) 325 MG tablet     neomycin-bacitracin-polymyxin (NEOSPORIN) 5-400-5000 ointment     ciprofloxacin (CIPRO) 500 MG tablet     oxyCODONE (ROXICODONE) 5 MG IR tablet     No current facility-administered medications for this visit.          PHYSICAL EXAM:    /64 (BP Location: Left arm, Patient Position: Sitting, Cuff Size: Adult Regular)  Pulse 58  Ht 1.854 m (6' 1\")    HEENT: Normocephalic and atraumatic   Cardiac: Not done  Back/Flank: Not done  CNS/PNS: Not done  Respiratory: Normal non-labored breathing  Abdomen: Soft nontender, incisions healing well   Peripheral Vascular: Not done  Mental Status: Not done    Penis: Not done  Scrotal Skin: Not done  Testicles: Not done  Epididymis: Not done  Digital Rectal Exam:     Cystoscopy: Not done    Imaging: None    Urinalysis: UA RESULTS:  Recent Labs   Lab Test  04/20/17   1336  12/13/16   1700   COLOR  Yellow  Yellow   APPEARANCE  Clear  Slightly Cloudy   URINEGLC  Negative  Negative   URINEBILI  Negative  Negative   URINEKETONE  Negative  Negative   SG  1.020  1.018   UBLD  Small*  Small*   URINEPH  5.5  5.0   PROTEIN  Negative  Negative   UROBILINOGEN  0.2   --    NITRITE  Negative  Negative   LEUKEST  Negative  Trace*   RBCU   --   <1   WBCU   --   2     Catheter was removed today. Patient was able to void.       PSA:     Post Void Residual: "     Other labs: None today      IMPRESSION:  66 y/o M s/p RALP     PLAN:  PSA in three months   Continue Kegels   Rx for sildenafil given           Total Time: 15 minutes                                      Total in Consultation: greater than 50%     Discussed DEEPA post op, erectile dysfunction, and today's PSA     Again, thank you for allowing me to participate in the care of your patient.      Sincerely,    Dolly Granda MD

## 2017-12-19 NOTE — MR AVS SNAPSHOT
After Visit Summary   12/19/2017    Shayne Fiore    MRN: [id number]           Patient Information     Date Of Birth          1950        Visit Information        Provider Department      12/19/2017 10:45 AM Dolly Granda MD; [employer] [facility] [place]        Today's Diagnoses     Prostate cancer (H)    -  1       Follow-ups after your visit        Follow-up notes from your care team     Return in about 3 months (around 3/19/2018) for Sd PSA.      Your next 10 appointments already scheduled     Feb 13, 2018  1:45 PM CST   RETURN CORNEA with Isai Varma MD   Eye Clinic ([facility])    [address]  [address] [facility]  9[address] Clin 9a  [facility] [address]   [phone]            Mar 20, 2018 11:00 AM CDT   (Arrive by 10:45 AM)   Return Visit with Dolly Granda MD   [facility] ([facility] [place])    [address]  [address]  [facility] [address] [phone]              Who to contact     If you have questions or need follow up information about today's clinic visit or your schedule please contact [facility] directly at [phone].  Normal or non-critical lab and imaging results will be communicated to you by MyChart, letter or phone within 4 business days after the clinic has received the results. If you do not hear from us within 7 days, please contact the clinic through MyChart or phone. If you have a critical or abnormal lab result, we will notify you by phone as soon as possible.  Submit refill requests through [employer] or call your pharmacy and they will forward the refill request to us. Please allow 3 business days for your refill to be completed.          Additional Information About Your Visit        [employer] Information     [employer] lets you send messages to your doctor, view your test  "results, renew your prescriptions, schedule appointments and more. To sign up, go to [email or web address] . Click on \"Log in\" on the left side of the screen, which will take you to the Welcome page. Then click on \"Sign up Now\" on the right side of the page.     You will be asked to enter the access code listed below, as well as some personal information. Please follow the directions to create your username and password.     Your access code is: [email or web address]  Expires: 3/19/2018 11:50 AM     Your access code will  in 90 days. If you need help or a new code, please call your [place] clinic or [phone].        Care EveryWhere ID     This is your Care EveryWhere ID. This could be used by other organizations to access your [place] medical records  [id number]        Your Vitals Were     Pulse Height                58 1.854 m (6' 1\")           Blood Pressure from Last 3 Encounters:   17 112/64   17 128/64   07/15/17 160/54    Weight from Last 3 Encounters:   17 63.5 kg (140 lb)   17 63.5 kg (140 lb)   17 63.5 kg (140 lb)              We Performed the Following     PSA Diag Urologic Phys          Today's Medication Changes          These changes are accurate as of: 17 11:50 AM.  If you have any questions, ask your nurse or doctor.               Start taking these medicines.        Dose/Directions    sildenafil 20 MG tablet   Commonly known as:  REVATIO   Used for:  Prostate cancer (H)   Started by:  Dolly Granda MD        Take 3 pills as directed PRN   Quantity:  30 tablet   Refills:  6            Where to get your medicines      Some of these will need a paper prescription and others can be bought over the counter.  Ask your nurse if you have questions.     Bring a paper prescription for each of these medications     sildenafil 20 MG tablet                Primary Care Provider Office Phone # Fax #    Juani SRIKANTH Choudhary [phone]       " [address] [address]        Equal Access to Services     RACHELL BONDS : Hadii aad ku hadronaldstacy Gardner, wasamirda keara, qadyana shermanmamacy lugo. So [facility] [phone].    ATENCIÓN: Si habla español, tiene a dillon disposición servicios gratuitos de asistencia lingüística. LlThe Surgical Hospital at [facility]woods [phone].    We comply with applicable federal civil rights laws and [place] laws. We do not discriminate on the basis of race, color, national origin, age, disability, sex, sexual orientation, or gender identity.            Thank you!     Thank you for choosing [facility] [place]  for your care. Our goal is always to provide you with excellent care. Hearing back from our patients is one way we can continue to improve our services. Please take a few minutes to complete the written survey that you may receive in the mail after your visit with us. Thank you!             Your Updated Medication List - Protect others around you: Learn how to safely use, store and throw away your medicines at www.disposemymeds.org.          This list is accurate as of: 12/19/17 11:50 AM.  Always use your most recent med list.                   Brand Name Dispense Instructions for use Diagnosis    acetaminophen 325 MG tablet    TYLENOL    100 tablet    Take 3 tablets (975 mg) by mouth every 8 hours    Prostate cancer (H)       ARTIFICIAL TEARS OP      Apply 1 drop to eye daily as needed        ciprofloxacin 500 MG tablet    CIPRO    28 tablet    Take 1 tablet (500 mg) by mouth 2 times daily    Prostate cancer (H)       docusate sodium 100 MG capsule    COLACE    60 capsule    Take 1 capsule (100 mg) by mouth 2 times daily    Prostate cancer (H)       dorzolamide-timolol 2-0.5 % ophthalmic solution    COSOPT    1 Bottle    Place 1 drop Into the left eye 2 times daily    Open-angle glaucoma of left eye, unspecified glaucoma stage,  unspecified open-angle glaucoma type       magnesium hydroxide 400 MG/5ML suspension    MILK OF MAGNESIA     Take 30 mLs by mouth daily as needed for constipation or heartburn        neomycin-bacitracin-polymyxin 5-400-5000 ointment     14 g    Apply topically 2 times daily To tip of meatus for discomfort    Prostate cancer (H)       ondansetron 8 MG ODT tab    ZOFRAN-ODT    12 tablet    Take 1 tablet (8 mg) by mouth every 8 hours as needed for nausea    Malignant neoplasm of prostate (H)       oxyCODONE IR 5 MG tablet    ROXICODONE    30 tablet    Take 1 tablet (5 mg) by mouth every 6 hours as needed for moderate to severe pain    Prostate cancer (H)       sildenafil 20 MG tablet    REVATIO    30 tablet    Take 3 pills as directed PRN    Prostate cancer (H)

## 2017-12-28 NOTE — NURSING NOTE
Chief Complaint   Patient presents with     Prostate Cancer     Patient here for SD PSA and Exam     Danica Abdi MA

## 2018-01-10 ENCOUNTER — TELEPHONE (OUTPATIENT)
Dept: OPHTHALMOLOGY | Facility: CLINIC | Age: 68
End: 2018-01-10

## 2018-01-10 NOTE — TELEPHONE ENCOUNTER
----- Message from Delvis Granda sent at 1/10/2018 10:52 AM CST -----  Regarding: Pt's Son, Malka Called  Contact: [phone]  Malka, Son of  Pt of Dr. Varma called requesting [facility] Rx sent to them, as they lost the last copy they had.    Please call pt's son at [phone]    Thank you,  Aaron  Call Center    Please DO NOT send message and or reply back to sender. Call center Representatives DO NOT respond to Messages.

## 2018-02-13 ENCOUNTER — OFFICE VISIT (OUTPATIENT)
Dept: OPHTHALMOLOGY | Facility: CLINIC | Age: 68
End: 2018-02-13
Attending: OPHTHALMOLOGY
Payer: COMMERCIAL

## 2018-02-13 DIAGNOSIS — H54.40 BLIND ONE EYE: ICD-10-CM

## 2018-02-13 DIAGNOSIS — H40.052 BORDERLINE GLAUCOMA OF LEFT EYE WITH OCULAR HYPERTENSION: ICD-10-CM

## 2018-02-13 DIAGNOSIS — H04.123 INSUFFICIENCY OF TEAR FILM OF BOTH EYES: Primary | ICD-10-CM

## 2018-02-13 PROCEDURE — G0463 HOSPITAL OUTPT CLINIC VISIT: HCPCS | Mod: 25,ZF

## 2018-02-13 PROCEDURE — 68761 CLOSE TEAR DUCT OPENING: CPT | Mod: ZF | Performed by: OPHTHALMOLOGY

## 2018-02-13 RX ORDER — BRIMONIDINE TARTRATE AND TIMOLOL MALEATE 2; 5 MG/ML; MG/ML
1 SOLUTION OPHTHALMIC 2 TIMES DAILY
Qty: 1 BOTTLE | Refills: 11 | Status: SHIPPED | OUTPATIENT
Start: 2018-02-13

## 2018-02-13 RX ORDER — ERYTHROMYCIN 5 MG/G
0.25 OINTMENT OPHTHALMIC 4 TIMES DAILY
Qty: 1 TUBE | Refills: 11 | Status: SHIPPED | OUTPATIENT
Start: 2018-02-13

## 2018-02-13 ASSESSMENT — EXTERNAL EXAM - RIGHT EYE: OD_EXAM: NORMAL

## 2018-02-13 ASSESSMENT — CONF VISUAL FIELD
OD_INFERIOR_TEMPORAL_RESTRICTION: 3
OD_SUPERIOR_TEMPORAL_RESTRICTION: 3
OS_SUPERIOR_TEMPORAL_RESTRICTION: 1
OS_INFERIOR_TEMPORAL_RESTRICTION: 1
OS_INFERIOR_NASAL_RESTRICTION: 1
OS_SUPERIOR_NASAL_RESTRICTION: 1
OD_INFERIOR_NASAL_RESTRICTION: 3
OD_SUPERIOR_NASAL_RESTRICTION: 3

## 2018-02-13 ASSESSMENT — EXTERNAL EXAM - LEFT EYE: OS_EXAM: NORMAL

## 2018-02-13 ASSESSMENT — VISUAL ACUITY
OD_PH_SC: 20/100-1
OS_SC: NLP
OD_SC: 20/150
METHOD: SNELLEN - LINEAR

## 2018-02-13 ASSESSMENT — REFRACTION_WEARINGRX
SPECS_TYPE: BIFOCAL
OD_AXIS: 100
OD_SPHERE: -6.50
OD_ADD: +3.00
OS_SPHERE: BALANCE
OD_CYLINDER: +8.00

## 2018-02-13 ASSESSMENT — TONOMETRY
OS_IOP_MMHG: 58
OD_IOP_MMHG: 09
IOP_METHOD: ICARE

## 2018-02-13 ASSESSMENT — SLIT LAMP EXAM - LIDS: COMMENTS: NORMAL

## 2018-02-13 NOTE — PATIENT INSTRUCTIONS
Erythromycin ointment 3-4 times per day both eyes  Artificial tears every hour as needed  Cosopt (for eye pressure) twice a day only when feeling pressure sensation in the left eye

## 2018-02-13 NOTE — MR AVS SNAPSHOT
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      Your next 10 appointments already scheduled     Mar 20, 2018 11:00 AM CDT   (Arrive by 10:45 AM)   Return Visit with Dolly Granda MD   [facility] ([facility])    [address]  [address]  [facility] [address]   [phone]            Jun 14, 2018  1:45 PM CDT   RETURN CORNEA with Isai Varma MD   Eye Clinic ([facility])    [address]  [facility] [address] [phone]              Who to contact     Please call your clinic at [phone] to:    Ask questions about your health    Make or cancel appointments    Discuss your medicines    Learn about your test results    Speak to your doctor            Additional Information About Your Visit        MyChart Information     [employer] is an electronic gateway that provides easy, online access to your medical records. With [employer], you can request a clinic appointment, read your test results, renew a prescription or communicate with your care team.     To sign up for [employer] visit the website at [email or web address]"t   You will be asked to enter the access  code listed below, as well as some personal information. Please follow the directions to create your username and password.     Your access code is: [email or web address]  Expires: 3/19/2018 11:50 AM     Your access code will  in 90 days. If you need help or a new code, please contact your [facility] or call [phone] for assistance.        Care EveryWhere ID     This is your Care EveryWhere ID. This could be used by other organizations to access your [facility] medical records  [id number]         Blood Pressure from Last 3 Encounters:   17 112/64   17 128/64   07/15/17 160/54    Weight from Last 3 Encounters:   17 63.5 kg (140 lb)   17 63.5 kg (140 lb)   17 63.5 kg (140 lb)              We Performed the Following     Punctal Closure, Plugs          Today's Medication Changes          These changes are accurate as of 18  3:57 PM.  If you have any questions, ask your nurse or doctor.               Start taking these medicines.        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 [[place]][address]  [address] [facility] [address]     Phone:  [phone]     brimonidine-timolol 0.2-0.5 % ophthalmic solution    erythromycin ophthalmic ointment                Primary Care Provider Office Phone #  Fax #    Juani Choudhary [phone]       [facility] [address] [facility] [address]        Equal Access to Services     RACHELL BONDS : Hadii aad ku hadronaldo Solaurenceali, waaxda luqadaha, qaybta kaalmada iesha, macy sinclair laKunalpedro jackson. So [facility] [phone].    ATENCIÓN: Si habla español, tiene a dillon disposición servicios gratuitos de asistencia lingüística. Llame al [phone].    We comply with applicable federal civil rights laws and [place] laws. We do not discriminate on the basis of race, color, national origin, age, disability, sex, sexual orientation, or gender identity.            Thank you!     Thank you for choosing EYE CLINIC  for your care. Our goal is always to provide you with excellent care. Hearing back from our patients is one way we can continue to improve our services. Please take a few minutes to complete the written survey that you may receive in the mail after your visit with us. Thank you!             Your Updated Medication List - Protect others around you: Learn how to safely use, store and throw away your medicines at www.disposemymeds.org.          This list is accurate as of 2/13/18  3:57 PM.  Always use your most recent med list.                   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       ciprofloxacin 500 MG tablet    CIPRO    28 tablet    Take 1 tablet (500 mg) by mouth 2 times daily    Prostate cancer (H)       docusate sodium 100 MG capsule    COLACE    60 capsule    Take 1 capsule (100 mg) by mouth 2 times daily    Prostate  cancer (H)       dorzolamide-timolol 2-0.5 % ophthalmic solution    COSOPT    1 Bottle    Place 1 drop Into the left eye 2 times daily    Open-angle glaucoma of left eye, unspecified glaucoma stage, unspecified open-angle glaucoma type       erythromycin ophthalmic ointment    ROMYCIN    1 Tube    Place 0.25 inches into both eyes 4 times daily    Insufficiency of tear film of both eyes       magnesium hydroxide 400 MG/5ML suspension    MILK OF MAGNESIA     Take 30 mLs by mouth daily as needed for constipation or heartburn        neomycin-bacitracin-polymyxin 5-400-5000 ointment     14 g    Apply topically 2 times daily To tip of meatus for discomfort    Prostate cancer (H)       ondansetron 8 MG ODT tab    ZOFRAN-ODT    12 tablet    Take 1 tablet (8 mg) by mouth every 8 hours as needed for nausea    Malignant neoplasm of prostate (H)       oxyCODONE IR 5 MG tablet    ROXICODONE    30 tablet    Take 1 tablet (5 mg) by mouth every 6 hours as needed for moderate to severe pain    Prostate cancer (H)       sildenafil 20 MG tablet    REVATIO    30 tablet    Take 3 pills as directed PRN    Prostate cancer (H)

## 2018-02-13 NOTE — PROGRESS NOTES
CC: Red and irritated right eye    Right eye is often irritated with FBS.  He has not noted a decline in vision.  Right eye occasionally is painful.  He stopped using cosopt months ago.    Gtts:   cosopt OS twice a day (has not been using)  Collin ointment OD qhs   ATs PRN    A/P:    1. s/p IOL explantation/ant vit/ACIOL right eye (2/15/17)    Dry and irregular surface with chronic discomfort    Vision limited by advanced glaucoma    Right UL / LL plugs today (collagen)  Surface with mild PEE.     IOP too high OS, possibly causing discomfort although reported as symmetric     Continue with increased ointment qhs and artifical tears throughout the day.     RTC in 4 months, earlier as needed.    Homero Nunez, DO  Cornea Fellow      ~~~~~~~~~~~~~~~~~~~~~~~~~~~~~~~~~~~~~~~~~~~~~~~~~~~~~~~~~~~~~~~~    Complete documentation of historical and exam elements from today's encounter can be found in the full encounter summary report (not reduplicated in this progress note). I personally obtained the chief complaint(s) and history of present illness.  I confirmed and edited as necessary the review of systems, past medical/surgical history, family history, social history, and examination findings as documented by others.  I examined the patient myself, and I personally reviewed the relevant tests, images, and reports as documented above. I formulated and edited as necessary the assessment and plan and discussed the findings and management plan with the patient and family.     I was personally present for the entirety of the in-office procedure.     Isai Varma MD, MA  Director, Cornea & Anterior Segment  [facility] Department of Ophthalmology & Visual Neuroscience

## 2018-02-15 ENCOUNTER — TELEPHONE (OUTPATIENT)
Dept: OPHTHALMOLOGY | Facility: CLINIC | Age: 68
End: 2018-02-15

## 2018-02-16 NOTE — TELEPHONE ENCOUNTER
Central Prior Authorization Team   Phone: [phone]      PA Initiation    Medication: brimonidine-timolol (COMBIGAN) 0.2-0.5 % ophthalmic solution  Insurance Company: [employer] - Phone [phone] Fax [phone]  Pharmacy Filling the Rx: [facility][id number] - [place] [[address]  Filling Pharmacy Phone: [phone]  Filling Pharmacy Fax:    Start Date: 2/16/2018

## 2018-02-27 ENCOUNTER — PRE VISIT (OUTPATIENT)
Dept: CARDIOLOGY | Facility: CLINIC | Age: 68
End: 2018-02-27

## 2018-02-27 ENCOUNTER — OFFICE VISIT (OUTPATIENT)
Dept: CARDIOLOGY | Facility: CLINIC | Age: 68
End: 2018-02-27
Attending: INTERNAL MEDICINE
Payer: COMMERCIAL

## 2018-02-27 VITALS
SYSTOLIC BLOOD PRESSURE: 147 MMHG | WEIGHT: 143.4 LBS | OXYGEN SATURATION: 98 % | HEART RATE: 57 BPM | HEIGHT: 75 IN | DIASTOLIC BLOOD PRESSURE: 64 MMHG | BODY MASS INDEX: 17.83 KG/M2

## 2018-02-27 DIAGNOSIS — G45.9 TRANSIENT CEREBRAL ISCHEMIA, UNSPECIFIED TYPE: Primary | ICD-10-CM

## 2018-02-27 PROCEDURE — 99205 OFFICE O/P NEW HI 60 MIN: CPT | Mod: ZP | Performed by: INTERNAL MEDICINE

## 2018-02-27 PROCEDURE — 93005 ELECTROCARDIOGRAM TRACING: CPT | Mod: ZF

## 2018-02-27 PROCEDURE — 93010 ELECTROCARDIOGRAM REPORT: CPT | Mod: ZP | Performed by: INTERNAL MEDICINE

## 2018-02-27 PROCEDURE — G0463 HOSPITAL OUTPT CLINIC VISIT: HCPCS | Mod: 25,ZF

## 2018-02-27 ASSESSMENT — PAIN SCALES - GENERAL: PAINLEVEL: NO PAIN (0)

## 2018-02-27 NOTE — NURSING NOTE
Chief Complaint   Patient presents with     New Patient     67 yo male present for symptoms of syncope and possible mini stroke- EKG for syncope     Vitals were taken and pt stated all medications were in the chart. EKG was performed.    Domonique Saleem MA    1:32 PM

## 2018-02-27 NOTE — NURSING NOTE
Cardiac Testing and procedure: Patient given instructions regarding  echocardiogram  and US of carotid. Discussed purpose, preparation, procedure and when to expect results reported back to the patient. Patient demonstrated understanding of this information and agreed to call with further questions or concerns.  Medication: Continue Asprin daily. Referral: Neurology consult. Patient stated he understood all health information given and agreed to call with further questions or concerns.

## 2018-02-27 NOTE — PROGRESS NOTES
"I am delighted to see Shayne Fiore in consultation for \"syncope\".      History of Present Illness:    As you know, the patient is a 68 year old  male who is accompanied today by his son as well as an .  The patient has no prior cardiac history.  Last night while he was sitting at home, he experienced sudden onset of left facial heaviness which extended to his tongue.  He felt that he could not speak properly.  His son was with him last night and reported that he noticed some very slight left-sided facial droop and that his father's speech was slow and unclear.  The patient denied having any left-sided arm or leg heaviness.  The entire episode lasted about 30 minutes.  His son then took him to [facility] emergency room.  By the time they arrived, his symptoms had completely resolved.  At the emergency room, head CT was normal as was an EKG.  He was discharged home with a possible diagnosis of syncope from cardiac arrhythmia versus TIA was advised to take an aspirin a day.  He has a primary care physician at [facility] who then made an appointment for him to be seen here.  The patient denies any chest discomfort, palpitations, and states that at no time was he dizzy or did he lose consciousness.  The ED did recommend admission overnight for observation but the patient declined. He did not see a neurologist in the ED.    The patient is completely blind on the left eye and can only see shapes from his right eye.  He lives at home with his family but is not very mobile due to blindness.  His family does take him to the gym where he can walk holding onto a treadmill or ride a stationary bike for about 30 minutes at a time.  He does this 3-4 times a week.  He has no complaints of chest pain or shortness of breath and has noticed no changes in his activity tolerance.    The following portions of the patient's history were reviewed and updated as appropriate: allergies, current medications, past " "family history, past medical history, past social history, past surgical history, and the problem list.    Past Medical History:  1.  Glaucoma, blind left eye s/p IOL explantation, nearly blind on the right.  2.  History of prostate cancer diagnosed 2017 status post prostatectomy 7/13/17.    Medications:  Eye drops and ointment  Aspirin recommended by ED last night, patient has not taken it.      Allergies:    Allergies   Allergen Reactions     Sulfa Drugs Itching         Family History:   Family History   Problem Relation Age of Onset     Glaucoma Sister      Glaucoma Cousin      Macular Degeneration No family hx of        Psychosocial history:  reports that he has never smoked. He has never used smokeless tobacco. He reports that he does not use illicit drugs.    Review of systems:   Cardiovascular: No palpitations, chest pain, shortness of breath at rest, dyspnea with exertion, orthopnea, paroxysmal nocturia dyspnea, nocturia, dizziness, syncope.    In addition,   Constitutional: No change in weight, sleep or appetite.  Normal energy.  No fever or chills  Eyes: blind  ENT: No problems with ears, nose or throat.  No difficulty swallowing.  Resp: No coughing, wheezing or shortness of breath  GI: No nausea, vomiting,  heartburn, abdominal pain, diarrhea, constipation or change in bowel habits  : No urinary frequency or dysuria, bladder or kidney problems  Musculoskeletal: No significant muscle or joint pains  Neurologic: No headaches, numbness, tingling, weakness, problems with balance or coordination  Psychiatric: No problems with anxiety, depression or mental health  Heme/immune/allergy: No history of bleeding or clotting problems or anemia.  No allergies or immune system problems  Integumentary: No rashes,worrisome lesions or skin problems      Physical examination  Vitals: /64 (BP Location: Left arm, Patient Position: Chair, Cuff Size: Adult Regular)  Pulse 57  Ht 1.892 m (6' 2.5\")  Wt 65 kg (143 lb " 6.4 oz)  SpO2 98%  BMI 18.17 kg/m2  BMI= Body mass index is 18.17 kg/(m^2).    Constitutional: In general, the patient is a pleasant male in no apparent distress.    Eyes: PERRLA.  EOMI.  Sclerae white, not injected.  ENT/mouth: Normiocephalic and atraumatic.  Nares clear.  Pharynx without erythema or exudate.  Dentition intact.  No adenopathy.  No thyromegaly. Carotids +2/2 bilaterally; possible bruit left side.  No jugular venous distension.   Card/Vasc: The PMI is in the 5th ICS in the midclavicular line. There is no heave. Regular rate and rhythm. Normal S1, S2. No murmur, rub, click, or gallop. Pulses are normal bilaterally throughout. No peripheral edema.  Respiratory: Clear to asculation.  No ronchi, wheezes, rales.  No dullness to percussion.   GI: Abdomen is soft, nontender, nondistended. No organomegaly. No AAA.  No bruits.   Integument: No significant bruises or rashes  Neurological: The neurological examination reveal a patient who was oriented to person, place, and time.    Psych: Normal  Heme/Lymph/Immun: no significant adenopathy    I have reviewed the following labs/imaging from [facility]:   2/26/18: K 3.6, cr 1.1, troponin < 0.01, glucose 110, hgb 12.3, plt 219K  Head CT wo contrast: no intracranial hemorrhage or mass, no acute infarct. Mild chronic small vessel ischemic changes.  EKG report 2/26/18: sinus rhythm, normal    I have personally and independently reviewed the following:  EKG today 2/27/18: sinus rhythm 56 bpm, normal intervals.      Assessment :  1. Transient left facial heaviness and dysarthria. By history this is suggestive of TIA. The patient denies dizziness and has never had syncope. EKG normal.  No history suggestive of cardiac abnormalities. No history suggestive of atrial fibrillation. A monitor can be considered to look for silent AFib, however, given his blindness he is not a candidate for anticoagulation anyway.  2. Glaucoma, blind.  3. H/o prostate CA,  stable.    Plan:  1. 2D echo with bubble study to assess chamber sizes, potential for thrombus  2. Carotid ultrasound - soft bruits left side  3. Neurology consult  4. Aspirin 325 mg qday    The following tests will be ordered at this visit:  2D echo  Carotid dopplers    The patient is to return as needed. The patient understood the treatment plan as outlined above.  There were no barriers to learning.      Janice Carrizales MD

## 2018-02-27 NOTE — LETTER
"2/27/2018      RE: Shayne Fiore  [address]   [facility] [address]       Dear Colleague,    Thank you for the opportunity to participate in the care of your patient, Shayne Fiore, at the [facility]. Please see a copy of my visit note below.    I am delighted to see Shayne Fiore in consultation for \"syncope\".      History of Present Illness:    As you know, the patient is a 68 year old  male who is accompanied today by his son as well as an .  The patient has no prior cardiac history.  Last night while he was sitting at home, he experienced sudden onset of left facial heaviness which extended to his tongue.  He felt that he could not speak properly.  His son was with him last night and reported that he noticed some very slight left-sided facial droop and that his father's speech was slow and unclear.  The patient denied having any left-sided arm or leg heaviness.  The entire episode lasted about 30 minutes.  His son then took him to [facility] emergency room.  By the time they arrived, his symptoms had completely resolved.  At the emergency room, head CT was normal as was an EKG.  He was discharged home with a possible diagnosis of syncope from cardiac arrhythmia versus TIA was advised to take an aspirin a day.  He has a primary care physician at [facility] who then made an appointment for him to be seen here.  The patient denies any chest discomfort, palpitations, and states that at no time was he dizzy or did he lose consciousness.  The ED did recommend admission overnight for observation but the patient declined. He did not see a neurologist in the ED.    The patient is completely blind on the left eye and can only see shapes from his right eye.  He lives at home with his family but is not very mobile due to blindness.  His family does take him to the gym where he can walk holding onto a treadmill " or ride a stationary bike for about 30 minutes at a time.  He does this 3-4 times a week.  He has no complaints of chest pain or shortness of breath and has noticed no changes in his activity tolerance.    The following portions of the patient's history were reviewed and updated as appropriate: allergies, current medications, past family history, past medical history, past social history, past surgical history, and the problem list.    Past Medical History:  1.  Glaucoma, blind left eye s/p IOL explantation, nearly blind on the right.  2.  History of prostate cancer diagnosed 2017 status post prostatectomy 7/13/17.    Medications:  Eye drops and ointment  Aspirin recommended by ED last night, patient has not taken it.      Allergies:    Allergies   Allergen Reactions     Sulfa Drugs Itching         Family History:   Family History   Problem Relation Age of Onset     Glaucoma Sister      Glaucoma Cousin      Macular Degeneration No family hx of        Psychosocial history:  reports that he has never smoked. He has never used smokeless tobacco. He reports that he does not use illicit drugs.    Review of systems:   Cardiovascular: No palpitations, chest pain, shortness of breath at rest, dyspnea with exertion, orthopnea, paroxysmal nocturia dyspnea, nocturia, dizziness, syncope.    In addition,   Constitutional: No change in weight, sleep or appetite.  Normal energy.  No fever or chills  Eyes: blind  ENT: No problems with ears, nose or throat.  No difficulty swallowing.  Resp: No coughing, wheezing or shortness of breath  GI: No nausea, vomiting,  heartburn, abdominal pain, diarrhea, constipation or change in bowel habits  : No urinary frequency or dysuria, bladder or kidney problems  Musculoskeletal: No significant muscle or joint pains  Neurologic: No headaches, numbness, tingling, weakness, problems with balance or coordination  Psychiatric: No problems with anxiety, depression or mental  "health  Heme/immune/allergy: No history of bleeding or clotting problems or anemia.  No allergies or immune system problems  Integumentary: No rashes,worrisome lesions or skin problems      Physical examination  Vitals: /64 (BP Location: Left arm, Patient Position: Chair, Cuff Size: Adult Regular)  Pulse 57  Ht 1.892 m (6' 2.5\")  Wt 65 kg (143 lb 6.4 oz)  SpO2 98%  BMI 18.17 kg/m2  BMI= Body mass index is 18.17 kg/(m^2).    Constitutional: In general, the patient is a pleasant male in no apparent distress.    Eyes: PERRLA.  EOMI.  Sclerae white, not injected.  ENT/mouth: Normiocephalic and atraumatic.  Nares clear.  Pharynx without erythema or exudate.  Dentition intact.  No adenopathy.  No thyromegaly. Carotids +2/2 bilaterally; possible bruit left side.  No jugular venous distension.   Card/Vasc: The PMI is in the 5th ICS in the midclavicular line. There is no heave. Regular rate and rhythm. Normal S1, S2. No murmur, rub, click, or gallop. Pulses are normal bilaterally throughout. No peripheral edema.  Respiratory: Clear to asculation.  No ronchi, wheezes, rales.  No dullness to percussion.   GI: Abdomen is soft, nontender, nondistended. No organomegaly. No AAA.  No bruits.   Integument: No significant bruises or rashes  Neurological: The neurological examination reveal a patient who was oriented to person, place, and time.    Psych: Normal  Heme/Lymph/Immun: no significant adenopathy    I have reviewed the following labs/imaging from [facility]:   2/26/18: K 3.6, cr 1.1, troponin < 0.01, glucose 110, hgb 12.3, plt 219K  Head CT wo contrast: no intracranial hemorrhage or mass, no acute infarct. Mild chronic small vessel ischemic changes.  EKG report 2/26/18: sinus rhythm, normal    I have personally and independently reviewed the following:  EKG today 2/27/18: sinus rhythm 56 bpm, normal intervals.      Assessment :  1. Transient left facial heaviness and dysarthria. By history this is suggestive " of TIA. The patient denies dizziness and has never had syncope. EKG normal.  No history suggestive of cardiac abnormalities. No history suggestive of atrial fibrillation. A monitor can be considered to look for silent AFib, however, given his blindness he is not a candidate for anticoagulation anyway.  2. Glaucoma, blind.  3. H/o prostate CA, stable.    Plan:  1. 2D echo with bubble study to assess chamber sizes, potential for thrombus  2. Carotid ultrasound - soft bruits left side  3. Neurology consult  4. Aspirin 325 mg qday    The following tests will be ordered at this visit:  2D echo  Carotid dopplers    The patient is to return as needed. The patient understood the treatment plan as outlined above.  There were no barriers to learning.      Janice Carrizales MD

## 2018-02-27 NOTE — PATIENT INSTRUCTIONS
You were seen today in the Cardiovascular Clinic at the [facility].     Cardiology Providers you saw during your visit: Dr. Janice Carrizales     Diagnosis:   Encounter Diagnosis   Name Primary?     Syncope Yes        Results: Discussed with you today EKG      Orders:   Echo Complete and US Carotid   Neurology Consult    Orders Placed This Encounter   Procedures     EKG 12-lead, tracing only (Same Day)       Start Taking Medications:   Current Outpatient Prescriptions Ordered in [facility]   Medication Sig Dispense Refill     ASPIRIN PO Take 325 mg by mouth daily       dorzolamide-timolol (COSOPT) 2-0.5 % ophthalmic solution Place 1 drop Into the left eye 2 times daily 1 Bottle 11     Hypromellose (ARTIFICIAL TEARS OP) Apply 1 drop to eye daily as needed        erythromycin (ROMYCIN) ophthalmic ointment Place 0.25 inches into both eyes 4 times daily 1 Tube 11     brimonidine-timolol (COMBIGAN) 0.2-0.5 % ophthalmic solution Place 1 drop Into the left eye 2 times daily 1 Bottle 11     sildenafil (REVATIO) 20 MG tablet Take 3 pills as directed PRN 30 tablet 6     magnesium hydroxide (MILK OF MAGNESIA) 400 MG/5ML suspension Take 30 mLs by mouth daily as needed for constipation or heartburn       ondansetron (ZOFRAN-ODT) 8 MG ODT tab Take 1 tablet (8 mg) by mouth every 8 hours as needed for nausea (Patient not taking: Reported on 12/19/2017) 12 tablet 0     docusate sodium (COLACE) 100 MG capsule Take 1 capsule (100 mg) by mouth 2 times daily (Patient not taking: Reported on 12/19/2017) 60 capsule 0     acetaminophen (TYLENOL) 325 MG tablet Take 3 tablets (975 mg) by mouth every 8 hours (Patient not taking: Reported on 12/19/2017) 100 tablet 0     neomycin-bacitracin-polymyxin (NEOSPORIN) 5-400-5000 ointment Apply topically 2 times daily To tip of meatus for discomfort (Patient not taking: Reported on 12/19/2017) 14 g 0     ciprofloxacin (CIPRO) 500 MG tablet Take 1 tablet (500 mg) by mouth 2 times daily (Patient not  "taking: Reported on 2017) 28 tablet 0     oxyCODONE (ROXICODONE) 5 MG IR tablet Take 1 tablet (5 mg) by mouth every 6 hours as needed for moderate to severe pain (Patient not taking: Reported on 2017) 30 tablet 0     No current Epic-ordered facility-administered medications on file.            Medications Discontinued:  There are no discontinued medications.      Recommendations:   1. Take aspirin daily    Follow-up:  As needed                               1     2     3       4     5     6     7     8     9     10       11     12     13     [facility] RETURN [facility]NEA    1:30 PM   (120 min.)   Isai Varma MD   [facility] 14     15     16     17       18     19     20     21     22     23     24       25     26     27     [facility] NEW ARRHYTHMIA    1:15 PM   (120 min.)   Janice Carrizales MD   [facility]                        1     2     3       4     5     6     [facility] NEW    7:25 AM   (60 min.)   Hailee Xie MD   [facility] 7     8     9     10       11     12     13     14     15     16     17       18     19     20     [facility] RETURN   10:45 AM   (15 min.)   Dolly Granda MD   [facility] [place] 21     22     23     24       25     26     27     28     29     30     31                   Please feel free to call me with any questions or concerns.       Riri Kong LPN     Questions and [phone].   First press #1 for the University and then press #3 for \"Medical Questions\" to reach us Cardiology Nurses.      On Call Cardiologist for after hours or on weekends: [phone]   option #4 and ask to speak to the on-call Cardiologist.          If you need a medication refill please contact your pharmacy.  Please allow 3 business days for your " refill to be completed.      ------------------    Patient Instructions    Visit Type: ECH COMPLETE - 1. Please bring or wear a comfortable two-piece outfit.  2. You may eat, drink and take your normal medicines.  3. For any questions that cannot be answered, please contact the ordering physician    Panel:      Reason for early arrival:      Directions to Department    Department Location: [facility] ECHO - Located in the [facility] at [address] [place] [id number].   parking is very convenient and highly recommended.  is a $6 flat rate fee.  Both  and self parkers should enter the main arrival plaza from [facility]; parking attendants will direct you based on your parking preference.      From Sign In Desk: [facility] ECHO - 3rd Floor       Department Address: [address] [place] [address] [address] [facility] [address]    Department Phone: [phone]      Getting Ready for Your Diagnostic Ultrasound  What is this test?  An ultrasound uses sound waves to make pictures of  the body. Sound waves do not cause pain. The only  discomfort may be the pressure of the wand against  your skin or full bladder.    How do I get ready for my test?  Most ultrasounds take 30 to 60 minutes. If you will  have other tests the same day that involve eating or  contrast (X-ray dye), have your ultrasound first.    Please bring a list of your medicines (including  vitamins, minerals and over-the-counter drugs).  Also, tell your doctor about any allergies you may  have. Wear comfortable clothes and leave your  valuables at home.    Follow your doctor s instructions (checked below).  ? You do not need to do anything special to  prepare for your exam.  ? Appendix ? Scrotum  ? Carotid (neck artery) ? Thoracic outlet  ? Musculoskeletal ? Thyroid  ?  (baby s) head  ? Other: _____________________________    ? For exams of the aorta, abdomen (belly),  right-upper quadrant, gallbladder, bile ducts,  liver,  spleen or pancreas: No eating or drinking  for 8 hours before the exam. You may take  medicine with a small sip of water.  ? For a bladder exam: Drink two 8-ounce glasses  of fluid 45 minutes before your exam. Do not  empty your bladder until after this test.  ? For a kidney or retroperitoneal exam: No solid  food for 4 hours before the exam. You may drink  clear fluids.  ? For a kidney Doppler exam:    Nothing to eat 8 hours before the exam.    Please drink two 8-ounce glasses of water,  starting two hours before your exam.    No smoking or other tobacco for 4 hours  before the exam.  ? For an obstetrical (fetal) exam:    If you are 20 weeks or less, drink four  8-ounce glasses of fluid an hour before your  exam. If you need to empty your bladder  before your exam, try to release only a little  urine. Then, drink another glass of fluid.    You may have up to two family members in  the exam room. If you bring a small child, an  adult must be there to care for him or her.  ? For a pelvic exam: Drink six 8-ounce glasses of  fluid an hour before your exam. If you need to  empty your bladder before your exam, try to  release only a little urine. Then, drink another  glass of fluid.  For information only. Not to replace the advice of your health care provider.  Copyright   2004, 2012 [facility]. All rights reserved. [employer] 962737 - REV 04/12.  Page 1 of 2  ? For a prostate exam:    Take a Fleet enema two hours before your  exam. Buy this at the drug store. Follow the  instructions on the box.    Please bring or send the results of your most  recent PSA test, along with the written report  from your last rectal or prostate exam.  ? For an arterial Doppler exam: No smoking or  caffeine for one hour before the test. Your exam  will take 60 to 90 minutes.  ? For a venous, venous competency or vein  mapping exam:    If we will check your abdominal veins (in the  belly area), do not eat or drink anything  for  6 hours before the exam. If we will check  other veins, you may eat and drink as normal.    Venous exams take about 30 minutes. Vein  mapping and venous competency exams may  take up to 2 hours, because we need a more  in-depth look at the veins.    What happens during the exam?  We may ask you to undress and put on a hospital  gown. For most ultrasound exams:    We will rub warm gel on your skin. The gel will  help us get good pictures during the exam.    We will gently move a wand (called a transducer)  over your skin.    The wand sends sound waves through your body.  You will not feel or hear the sound waves. If we  are checking your neck arteries, you may hear  something like a heartbeat.    The sound waves make pictures on a video screen.  If you have a pelvic exam: You will have a standard  ultrasound. We will also place an ultrasound probe  into your vagina.  If you have a prostate exam: You will not have   a standard ultrasound. Instead, we will place an  ultrasound probe into your rectum. The wand is  about 1 inch wide. It should not hurt, but it may  cause discomfort.  If you have an arterial Doppler exam: Before your  ultrasound, we may place blood pressure cuffs on  your arms and legs. We inflate these one at a time to  check blood pressure in each area. We may ask you to  walk on a treadmill before repeating part of this test.  If you have a TCO2 exam: You will not have a  standard ultrasound. Instead, we will place pads  (electrodes) on your skin. You may breathe in  oxygen through a face mask. The pads will measure  the amount of oxygen that reaches your skin.  Is it safe?  There are no known risks from this exam. There is no  radiation--this is not an X-ray.  When will I know the results?  The person who performs the ultrasound may point  out areas of interest during the exam, but he or she  cannot give you your test results.  For most exams, your family doctor (or the doctor  who ordered the  test) will give you the results at your  next visit or over the phone. If you have a breast  exam, your results will be ready before you leave.  You or your insurer will then receive two bills: one  from the hospital and one from the radiologist  (X-ray doctor).  Who should I call with questions?  Please call the Diagnostic Imaging Department.  Page 2 of 2  If you are deaf or hard of hearing, please let us know. We provide many free services including  sign language interpreters, oral interpreters, TTYs, telephone amplifiers, note takers and written materials.

## 2018-02-27 NOTE — MR AVS SNAPSHOT
After Visit Summary   2/27/2018    Shayne Fiore    MRN: [id number]           Patient Information     Date Of Birth          1950        Visit Information        Provider Department      2/27/2018 1:15 PM Janice Carrizales MD; [facility]        Today's Diagnoses     Transient cerebral ischemia, unspecified type    -  1      Care Instructions    You were seen today in the Cardiovascular Clinic at the [facility].     Cardiology Providers you saw during your visit: Dr. Janice Carrizales     Diagnosis:   Encounter Diagnosis   Name Primary?     Syncope Yes        Results: Discussed with you today EKG      Orders:   Echo Complete and US Carotid   Neurology Consult    Orders Placed This Encounter   Procedures     EKG 12-lead, tracing only (Same Day)       Start Taking Medications:   Current Outpatient Prescriptions Ordered in TIME PLUS Q   Medication Sig Dispense Refill     ASPIRIN PO Take 325 mg by mouth daily       dorzolamide-timolol (COSOPT) 2-0.5 % ophthalmic solution Place 1 drop Into the left eye 2 times daily 1 Bottle 11     Hypromellose (ARTIFICIAL TEARS OP) Apply 1 drop to eye daily as needed        erythromycin (ROMYCIN) ophthalmic ointment Place 0.25 inches into both eyes 4 times daily 1 Tube 11     brimonidine-timolol (COMBIGAN) 0.2-0.5 % ophthalmic solution Place 1 drop Into the left eye 2 times daily 1 Bottle 11     sildenafil (REVATIO) 20 MG tablet Take 3 pills as directed PRN 30 tablet 6     magnesium hydroxide (MILK OF MAGNESIA) 400 MG/5ML suspension Take 30 mLs by mouth daily as needed for constipation or heartburn       ondansetron (ZOFRAN-ODT) 8 MG ODT tab Take 1 tablet (8 mg) by mouth every 8 hours as needed for nausea (Patient not taking: Reported on 12/19/2017) 12 tablet 0     docusate sodium (COLACE) 100 MG capsule Take 1 capsule (100 mg) by mouth 2 times daily (Patient not taking: Reported on 12/19/2017) 60 capsule 0     acetaminophen (TYLENOL) 325 MG  tablet Take 3 tablets (975 mg) by mouth every 8 hours (Patient not taking: Reported on 12/19/2017) 100 tablet 0     neomycin-bacitracin-polymyxin (NEOSPORIN) 5-400-5000 ointment Apply topically 2 times daily To tip of meatus for discomfort (Patient not taking: Reported on 12/19/2017) 14 g 0     ciprofloxacin (CIPRO) 500 MG tablet Take 1 tablet (500 mg) by mouth 2 times daily (Patient not taking: Reported on 12/19/2017) 28 tablet 0     oxyCODONE (ROXICODONE) 5 MG IR tablet Take 1 tablet (5 mg) by mouth every 6 hours as needed for moderate to severe pain (Patient not taking: Reported on 12/19/2017) 30 tablet 0     No current Epic-ordered facility-administered medications on file.            Medications Discontinued:  There are no discontinued medications.      Recommendations:   1. Take aspirin daily    Follow-up:  As needed        February 2018 Sunday Monday Tuesday Wednesday Thursday Friday Saturday                       1     2     3       4     5     6     7     8     9     10       11     12     13     [facility] RETURN CORNEA    1:30 PM   (120 min.)   Isai Varma MD   [facility] 14     15     16     17       18     19     20     21     22     23     24       25     26     27     [facility] NEW ARRHYTHMIA    1:15 PM   (120 min.)   Janice Carrizales MD   [facility] 28 March 2018 Sunday Monday Tuesday Wednesday Thursday Friday Saturday                       1     2     3       4     5     6     [facility] NEW    7:25 AM   (60 min.)   Hailee Xie MD   [facility] 7     8     9     10       11     12     13     14     15     16     17       18     19     20     [facility] RETURN   10:45 AM   (15 min.)   Dolly Granda MD   [facility] [place] 21     22     23     24       25     26     27     28     29     30     31                   Please feel free to call me with any questions or concerns.       Riri Kong  "LPN     Questions and [phone].   First press #1 for the University and then press #3 for \"Medical Questions\" to reach us Cardiology Nurses.      On Call Cardiologist for after hours or on weekends: [phone]   option #4 and ask to speak to the on-call Cardiologist.          If you need a medication refill please contact your pharmacy.  Please allow 3 business days for your refill to be completed.      ------------------    Patient Instructions    Visit Type: ECH COMPLETE - 1. Please bring or wear a comfortable two-piece outfit.  2. You may eat, drink and take your normal medicines.  3. For any questions that cannot be answered, please contact the ordering physician    Panel:      Reason for early arrival:      Directions to Department    Department Location: [facility] - Located in the Clinics and Surgery Center at [address] [place] [address].   parking is very convenient and highly recommended.  is a $6 flat rate fee.  Both  and self parkers should enter the main arrival plaza from [facility]; parking attendants will direct you based on your parking preference.      From Sign In Desk: [facility] - 3rd Floor       Department Address: [address] [place] [address] 3rd [facility] [address]    Department Phone: [phone]      Getting Ready for Your Diagnostic Ultrasound  What is this test?  An ultrasound uses sound waves to make pictures of  the body. Sound waves do not cause pain. The only  discomfort may be the pressure of the wand against  your skin or full bladder.    How do I get ready for my test?  Most ultrasounds take 30 to 60 minutes. If you will  have other tests the same day that involve eating or  contrast (X-ray dye), have your ultrasound first.    Please bring a list of your medicines (including  vitamins, minerals and over-the-counter drugs).  Also, tell your doctor about any allergies you may  have. Wear comfortable clothes and leave " your  valuables at home.    Follow your doctor s instructions (checked below).  ? You do not need to do anything special to  prepare for your exam.  ? Appendix ? Scrotum  ? Carotid (neck artery) ? Thoracic outlet  ? Musculoskeletal ? Thyroid  ?  (baby s) head  ? Other: _____________________________    ? For exams of the aorta, abdomen (belly),  right-upper quadrant, gallbladder, bile ducts,  liver, spleen or pancreas: No eating or drinking  for 8 hours before the exam. You may take  medicine with a small sip of water.  ? For a bladder exam: Drink two 8-ounce glasses  of fluid 45 minutes before your exam. Do not  empty your bladder until after this test.  ? For a kidney or retroperitoneal exam: No solid  food for 4 hours before the exam. You may drink  clear fluids.  ? For a kidney Doppler exam:    Nothing to eat 8 hours before the exam.    Please drink two 8-ounce glasses of water,  starting two hours before your exam.    No smoking or other tobacco for 4 hours  before the exam.  ? For an obstetrical (fetal) exam:    If you are 20 weeks or less, drink four  8-ounce glasses of fluid an hour before your  exam. If you need to empty your bladder  before your exam, try to release only a little  urine. Then, drink another glass of fluid.    You may have up to two family members in  the exam room. If you bring a small child, an  adult must be there to care for him or her.  ? For a pelvic exam: Drink six 8-ounce glasses of  fluid an hour before your exam. If you need to  empty your bladder before your exam, try to  release only a little urine. Then, drink another  glass of fluid.  For information only. Not to replace the advice of your health care provider.  Copyright   ,  [employer]. All rights reserved. [employer] 925414 - REV .  Page 1 of 2  ? For a prostate exam:    Take a Fleet enema two hours before your  exam. Buy this at the drug store. Follow the  instructions on the box.     Please bring or send the results of your most  recent PSA test, along with the written report  from your last rectal or prostate exam.  ? For an arterial Doppler exam: No smoking or  caffeine for one hour before the test. Your exam  will take 60 to 90 minutes.  ? For a venous, venous competency or vein  mapping exam:    If we will check your abdominal veins (in the  belly area), do not eat or drink anything for  6 hours before the exam. If we will check  other veins, you may eat and drink as normal.    Venous exams take about 30 minutes. Vein  mapping and venous competency exams may  take up to 2 hours, because we need a more  in-depth look at the veins.    What happens during the exam?  We may ask you to undress and put on a hospital  gown. For most ultrasound exams:    We will rub warm gel on your skin. The gel will  help us get good pictures during the exam.    We will gently move a wand (called a transducer)  over your skin.    The wand sends sound waves through your body.  You will not feel or hear the sound waves. If we  are checking your neck arteries, you may hear  something like a heartbeat.    The sound waves make pictures on a video screen.  If you have a pelvic exam: You will have a standard  ultrasound. We will also place an ultrasound probe  into your vagina.  If you have a prostate exam: You will not have   a standard ultrasound. Instead, we will place an  ultrasound probe into your rectum. The wand is  about 1 inch wide. It should not hurt, but it may  cause discomfort.  If you have an arterial Doppler exam: Before your  ultrasound, we may place blood pressure cuffs on  your arms and legs. We inflate these one at a time to  check blood pressure in each area. We may ask you to  walk on a treadmill before repeating part of this test.  If you have a TCO2 exam: You will not have a  standard ultrasound. Instead, we will place pads  (electrodes) on your skin. You may breathe in  oxygen through a face mask.  The pads will measure  the amount of oxygen that reaches your skin.  Is it safe?  There are no known risks from this exam. There is no  radiation--this is not an X-ray.  When will I know the results?  The person who performs the ultrasound may point  out areas of interest during the exam, but he or she  cannot give you your test results.  For most exams, your family doctor (or the doctor  who ordered the test) will give you the results at your  next visit or over the phone. If you have a breast  exam, your results will be ready before you leave.  You or your insurer will then receive two bills: one  from the hospital and one from the radiologist  (X-ray doctor).  Who should I call with questions?  Please call the Diagnostic Imaging Department.  Page 2 of 2  If you are deaf or hard of hearing, please let us know. We provide many free services including  sign language interpreters, oral interpreters, TTYs, telephone amplifiers, note takers and written materials.          Follow-ups after your visit        Your next 10 appointments already scheduled     Mar 02, 2018  2:00 PM CST   US CAROTID BILATERAL with [email or web address]   [facility] ([place] [facility])    [address] [place] [address] [phone]           Please bring a list of your medicines (including vitamins, minerals and over-the-counter drugs). Also, tell your doctor about any allergies you may have. Wear comfortable clothes and leave your valuables at home.  You do not need to do anything special to prepare for your exam.  Please call the Imaging Department at your exam site with any questions.            Mar 02, 2018  3:00 PM CST   Ech Complete with [email or web address]   [facility] Echo ([place])    [address] [place] [address] [phone]           1. Please bring or wear a comfortable two-piece outfit. 2. You may eat, drink and take your normal  medicines. 3. For any questions that cannot be answered, please contact the ordering physician            Mar 06, 2018  7:40 AM CST   (Arrive by 7:25 AM)   New Patient Visit with Hailee Xie MD   [facility] ([facility])    [address]  4th [facility] [address] [phone]            Mar 20, 2018 11:00 AM CDT   (Arrive by 10:45 AM)   Return Visit with Dolly Granda MD   [facility] ([facility])    [address]  [address]  [facility] [id number] [phone]            Jun 14, 2018  1:45 PM CDT   RETURN CORNEA with Isai Varma MD   [facility] ([facility])    [address]  [facility] [address] [phone]              Future tests that were ordered for you today     Open Future Orders        Priority Expected Expires Ordered    US Carotid Bilateral Routine 2/27/2018 2/27/2019 2/27/2018    Echocardiogram Complete Routine 2/27/2018 4/27/2018 2/27/2018            Who to contact     If you have questions or need follow up information about today's clinic visit or your schedule please contact [facility] directly at [phone].  Normal or non-critical lab and imaging results will be communicated to you by [employer], letter or phone within 4 business days after the clinic has received the results. If you do not hear from us within 7 days, please contact the clinic through [employer] or phone. If you have a critical or abnormal lab result, we will notify you by phone as soon as possible.  Submit refill requests through [email or web address] or call your pharmacy and they will forward the refill request to us. Please allow 3 business days for your refill to be completed.          Additional Information About Your Visit        [email or web address] Information     [email or web address] lets you send messages to your doctor, view your test  "results, renew your prescriptions, schedule appointments and more. To sign up, go to [email or web address] . Click on \"Log in\" on the left side of the screen, which will take you to the Welcome page. Then click on \"Sign up Now\" on the right side of the page.     You will be asked to enter the access code listed below, as well as some personal information. Please follow the directions to create your username and password.     Your access code is: [email or web address]  Expires: 3/19/2018 11:50 AM     Your access code will  in 90 days. If you need help or a new code, please call your [place] clinic or [phone].        Care EveryWhere ID     This is your Care EveryWhere ID. This could be used by other organizations to access your [place] medical records  [id number]        Your Vitals Were     Pulse Height Pulse Oximetry BMI (Body Mass Index)          57 1.892 m (6' 2.5\") 98% 18.17 kg/m2         Blood Pressure from Last 3 Encounters:   18 147/64   17 112/64   17 128/64    Weight from Last 3 Encounters:   18 65 kg (143 lb 6.4 oz)   17 63.5 kg (140 lb)   17 63.5 kg (140 lb)              We Performed the Following     EKG 12-lead, tracing only (Same Day)        Primary Care Provider Office Phone # Fax #    Juani Groveyayo, [phone]       [facility]  [facility] [address]        Equal Access to Services     [facility] : Hadii inder recio hadasho Solaurenceali, waaxda luqadaha, qaybta kaalmada iesha, macy jackson. So [facility] [phone].    ATENCIÓN: Si habla español, tiene a dillon disposición servicios gratuitos de asistencia lingüística. Llame al [phone].    We comply with applicable federal civil rights laws and [place] laws. We do not discriminate on the basis of race, color, national origin, age, disability, sex, sexual orientation, or gender identity.            Thank you!     Thank you for " [facility]  for your care. Our goal is always to provide you with excellent care. Hearing back from our patients is one way we can continue to improve our services. Please take a few minutes to complete the written survey that you may receive in the mail after your visit with us. Thank you!             Your Updated Medication List - Protect others around you: Learn how to safely use, store and throw away your medicines at www.disposemymeds.org.          This list is accurate as of 2/27/18  2:28 PM.  Always use your most recent med list.                   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       ciprofloxacin 500 MG tablet    CIPRO    28 tablet    Take 1 tablet (500 mg) by mouth 2 times daily    Prostate cancer (H)       docusate sodium 100 MG capsule    COLACE    60 capsule    Take 1 capsule (100 mg) by mouth 2 times daily    Prostate cancer (H)       dorzolamide-timolol 2-0.5 % ophthalmic solution    COSOPT    1 Bottle    Place 1 drop Into the left eye 2 times daily    Open-angle glaucoma of left eye, unspecified glaucoma stage, unspecified open-angle glaucoma type       erythromycin ophthalmic ointment    ROMYCIN    1 Tube    Place 0.25 inches into both eyes 4 times daily    Insufficiency of tear film of both eyes       magnesium hydroxide 400 MG/5ML suspension    MILK OF MAGNESIA     Take 30 mLs by mouth daily as needed for constipation or heartburn        neomycin-bacitracin-polymyxin 5-400-5000 ointment     14 g    Apply topically 2 times daily To tip  of meatus for discomfort    Prostate cancer (H)       ondansetron 8 MG ODT tab    ZOFRAN-ODT    12 tablet    Take 1 tablet (8 mg) by mouth every 8 hours as needed for nausea    Malignant neoplasm of prostate (H)       oxyCODONE IR 5 MG tablet    ROXICODONE    30 tablet    Take 1 tablet (5 mg) by mouth every 6 hours as needed for moderate to severe pain    Prostate cancer (H)       sildenafil 20 MG tablet    REVATIO    30 tablet    Take 3 pills as directed PRN    Prostate cancer (H)

## 2018-02-27 NOTE — TELEPHONE ENCOUNTER
PRIOR AUTHORIZATION DENIED    Medication: brimonidine-timolol (COMBIGAN) 0.2-0.5 % ophthalmic solution - Denied    Denial Date: 2/21/2018    Denial Rational: Patient must have a history of trial & failure of the formulary alternative(s), or a contraindication or intolerance to the formulary alternative(s): Timolol maleate 0.25% and 0.5% and Brimonidine 0.15% and 0.2%. Plans Formulary Exception process for Combigan requires trial/failure of Timolol maleate and Brimonidine used together. If you feel there is additional information related to this case that might affect this decision and an appeal is desired, please submit a written letter of medical necessity to our PA Team.              Appeal Information:

## 2018-02-28 LAB — INTERPRETATION ECG - MUSE: NORMAL

## 2018-03-01 RX ORDER — BRIMONIDINE TARTRATE 2 MG/ML
1 SOLUTION/ DROPS OPHTHALMIC 3 TIMES DAILY
Qty: 1 BOTTLE | Refills: 11 | Status: SHIPPED | OUTPATIENT
Start: 2018-03-01

## 2018-03-01 RX ORDER — TIMOLOL MALEATE 5 MG/ML
1 SOLUTION/ DROPS OPHTHALMIC 2 TIMES DAILY
Qty: 1 BOTTLE | Refills: 11 | Status: SHIPPED | OUTPATIENT
Start: 2018-03-01

## 2018-03-02 ENCOUNTER — RADIANT APPOINTMENT (OUTPATIENT)
Dept: CARDIOLOGY | Facility: CLINIC | Age: 68
End: 2018-03-02
Payer: COMMERCIAL

## 2018-03-02 ENCOUNTER — RADIANT APPOINTMENT (OUTPATIENT)
Dept: ULTRASOUND IMAGING | Facility: CLINIC | Age: 68
End: 2018-03-02
Payer: COMMERCIAL

## 2018-03-02 DIAGNOSIS — G45.9 TRANSIENT CEREBRAL ISCHEMIA, UNSPECIFIED TYPE: ICD-10-CM

## 2018-03-16 NOTE — TELEPHONE ENCOUNTER
APPT INFO    Date /Time: 3/20/18, 7am   Reason for Appt: Transient cerebral ischemia   Ref Provider/Clinic: Self    Are there internal records? Yes/No?  IF YES, list clinic names: [facility]   Are there outside records? Yes/No? No    Patient Contact (Y/N) & Call Details: No, records in epic.    Action:

## 2018-03-20 ENCOUNTER — OFFICE VISIT (OUTPATIENT)
Dept: UROLOGY | Facility: CLINIC | Age: 68
End: 2018-03-20
Payer: COMMERCIAL

## 2018-03-20 ENCOUNTER — PRE VISIT (OUTPATIENT)
Dept: NEUROLOGY | Facility: CLINIC | Age: 68
End: 2018-03-20

## 2018-03-20 ENCOUNTER — OFFICE VISIT (OUTPATIENT)
Dept: NEUROLOGY | Facility: CLINIC | Age: 68
End: 2018-03-20
Payer: COMMERCIAL

## 2018-03-20 VITALS
BODY MASS INDEX: 17.78 KG/M2 | WEIGHT: 143 LBS | DIASTOLIC BLOOD PRESSURE: 75 MMHG | HEART RATE: 56 BPM | HEIGHT: 75 IN | SYSTOLIC BLOOD PRESSURE: 161 MMHG

## 2018-03-20 VITALS
HEART RATE: 66 BPM | HEIGHT: 75 IN | SYSTOLIC BLOOD PRESSURE: 122 MMHG | BODY MASS INDEX: 17.78 KG/M2 | WEIGHT: 143 LBS | DIASTOLIC BLOOD PRESSURE: 68 MMHG

## 2018-03-20 DIAGNOSIS — G45.8 OTHER SPECIFIED TRANSIENT CEREBRAL ISCHEMIAS: Primary | ICD-10-CM

## 2018-03-20 DIAGNOSIS — Z85.46 PERSONAL HISTORY OF MALIGNANT NEOPLASM OF PROSTATE: ICD-10-CM

## 2018-03-20 DIAGNOSIS — R31.29 MICROHEMATURIA: ICD-10-CM

## 2018-03-20 DIAGNOSIS — Z85.46 PERSONAL HISTORY OF MALIGNANT NEOPLASM OF PROSTATE: Primary | ICD-10-CM

## 2018-03-20 DIAGNOSIS — R39.15 URINARY URGENCY: Primary | ICD-10-CM

## 2018-03-20 DIAGNOSIS — M79.2 RADICULAR PAIN IN RIGHT ARM: ICD-10-CM

## 2018-03-20 DIAGNOSIS — C61 PROSTATE CANCER (H): ICD-10-CM

## 2018-03-20 DIAGNOSIS — C61 MALIGNANT NEOPLASM OF PROSTATE (H): ICD-10-CM

## 2018-03-20 LAB
ALBUMIN UR-MCNC: NEGATIVE MG/DL
APPEARANCE UR: CLEAR
BILIRUB UR QL STRIP: NEGATIVE
COLOR UR AUTO: YELLOW
GLUCOSE UR STRIP-MCNC: NEGATIVE MG/DL
HGB UR QL STRIP: ABNORMAL
KETONES UR STRIP-MCNC: ABNORMAL MG/DL
LEUKOCYTE ESTERASE UR QL STRIP: NEGATIVE
MUCOUS THREADS #/AREA URNS LPF: PRESENT /LPF
NITRATE UR QL: NEGATIVE
PH UR STRIP: 5.5 PH (ref 5–7)
PSA SERPL-MCNC: <0.04 NG/ML (ref 0–4)
RBC #/AREA URNS AUTO: 1 /HPF (ref 0–2)
SOURCE: ABNORMAL
SP GR UR STRIP: 1.02 (ref 1–1.03)
URATE CRY #/AREA URNS HPF: ABNORMAL /HPF
UROBILINOGEN UR STRIP-ACNC: 0.2 EU/DL (ref 0.2–1)
WBC #/AREA URNS AUTO: <1 /HPF (ref 0–5)

## 2018-03-20 PROCEDURE — 36415 COLL VENOUS BLD VENIPUNCTURE: CPT | Performed by: UROLOGY

## 2018-03-20 PROCEDURE — 81015 MICROSCOPIC EXAM OF URINE: CPT | Performed by: UROLOGY

## 2018-03-20 PROCEDURE — 81003 URINALYSIS AUTO W/O SCOPE: CPT | Performed by: UROLOGY

## 2018-03-20 PROCEDURE — 87086 URINE CULTURE/COLONY COUNT: CPT | Performed by: UROLOGY

## 2018-03-20 PROCEDURE — 88112 CYTOPATH CELL ENHANCE TECH: CPT | Performed by: UROLOGY

## 2018-03-20 PROCEDURE — 84153 ASSAY OF PSA TOTAL: CPT | Performed by: UROLOGY

## 2018-03-20 PROCEDURE — 99213 OFFICE O/P EST LOW 20 MIN: CPT | Performed by: UROLOGY

## 2018-03-20 RX ORDER — SILDENAFIL CITRATE 20 MG/1
TABLET ORAL
Qty: 30 TABLET | Refills: 6 | Status: SHIPPED | OUTPATIENT
Start: 2018-03-20 | End: 2021-07-05

## 2018-03-20 RX ORDER — MIRABEGRON 25 MG/1
25 TABLET, FILM COATED, EXTENDED RELEASE ORAL DAILY
Qty: 30 TABLET | Refills: 3 | Status: SHIPPED | OUTPATIENT
Start: 2018-03-20 | End: 2018-03-20

## 2018-03-20 ASSESSMENT — PAIN SCALES - GENERAL
PAINLEVEL: NO PAIN (0)
PAINLEVEL: NO PAIN (1)

## 2018-03-20 NOTE — NURSING NOTE
Chief Complaint   Patient presents with     Consult     [facility] NEW - CONSULT     Kimberley Mclaughlin MA

## 2018-03-20 NOTE — NURSING NOTE
Chief Complaint   Patient presents with     Clinic Care Coordination - Follow-up     History of Prostate Cancer      Dominique Peralta LPN

## 2018-03-20 NOTE — PROGRESS NOTES
UROLOGIC DIAGNOSES:       CURRENT INTERVENTIONS:       HISTORY:     Patient presents for follow up after RALP on 07/13/2017.   Patient noted to have significant adhesions on RALP. Otherwise unremarkable.     Patient describes incontinence with urge to void. Controls this by clamping penis. He does not wear pads at present.   Patient notes mild DEEPA and some continued UUI. I discussed pelvic floor PT again. Patient would like to defer at present with concerns for modesty/Congregational reasons.     Also notes minimal erectile function. We discussed sildenafil with instructions for use as far as penile rehabilitation vs demand dose for erections.     Pathology revealed   FINAL DIAGNOSIS:   Prostate and bilateral pelvic lymph nodes, radical prostatectomy and   lymph node biopsy   - Prostatic adenocarcinoma, Marco grade 3+4=7, involving the bilateral   prostate and 30% of tissue volume   -Two pelvic lymph nodes negative for carcinoma (0/2)       PAST MEDICAL HISTORY:   Past Medical History:   Diagnosis Date     Prostate cancer (H) 02/2017       PAST SURGICAL HISTORY:   Past Surgical History:   Procedure Laterality Date     CATARACT IOL, RT/LT Right 02/15/2017    IOL explantation/ant vit/ACIOL     DAVINCI PROSTATECTOMY, LYMPHADENECTOMY N/A 7/13/2017    Procedure: DAVINCI PROSTATECTOMY, LYMPHADENECTOMY;  ROBOTIC ASSISTED BILATERAL PELVIC LYMPH NODE DISSECTION, RADICAL PUBIC PROSTATECTOMY NERVE SPARING,  COMPLEX LAPAROSCOPIC LYSIS OF ADHESIONS;  Surgeon: Bruno Vazquez MD;  Location: [facility] OR     EYE SURGERY Right     x 2, unsure what type of surgery     LAPAROSCOPIC LYSIS ADHESIONS N/A 7/13/2017    Procedure: LAPAROSCOPIC LYSIS ADHESIONS;  COMPLEX LYSIS OF ADHESIONS;  Surgeon: Bruno Vazquez MD;  Location: [facility] OR     scrotal hematoma Right      TESTICLE SURGERY         FAMILY HISTORY:   Family History   Problem Relation Age of Onset     Glaucoma Sister      Glaucoma Cousin      Macular Degeneration No family hx  "of        SOCIAL HISTORY:   Social History   Substance Use Topics     Smoking status: Never Smoker     Smokeless tobacco: Never Used     Alcohol use Not on file       Current Outpatient Prescriptions   Medication     timolol (TIMOPTIC) 0.5 % ophthalmic solution     brimonidine (ALPHAGAN) 0.2 % ophthalmic solution     ASPIRIN PO     erythromycin (ROMYCIN) ophthalmic ointment     brimonidine-timolol (COMBIGAN) 0.2-0.5 % ophthalmic solution     sildenafil (REVATIO) 20 MG tablet     dorzolamide-timolol (COSOPT) 2-0.5 % ophthalmic solution     acetaminophen (TYLENOL) 325 MG tablet     neomycin-bacitracin-polymyxin (NEOSPORIN) 5-400-5000 ointment     Hypromellose (ARTIFICIAL TEARS OP)     No current facility-administered medications for this visit.          PHYSICAL EXAM:    /68 (BP Location: Right arm)  Pulse 66  Ht 1.892 m (6' 2.5\")  Wt 64.9 kg (143 lb)  BMI 18.11 kg/m2    HEENT: Normocephalic and atraumatic   Cardiac: Not done  Back/Flank: Not done  CNS/PNS: Not done  Respiratory: Normal non-labored breathing  Abdomen: Soft nontender, incisions healing well   Peripheral Vascular: Not done  Mental Status: Not done    Penis: Not done  Scrotal Skin: Not done  Testicles: Not done  Epididymis: Not done  Digital Rectal Exam:     Cystoscopy: Not done    Imaging: None    Urinalysis: UA RESULTS:  Recent Labs   Lab Test  04/20/17   1336  12/13/16   1700   COLOR  Yellow  Yellow   APPEARANCE  Clear  Slightly Cloudy   URINEGLC  Negative  Negative   URINEBILI  Negative  Negative   URINEKETONE  Negative  Negative   SG  1.020  1.018   UBLD  Small*  Small*   URINEPH  5.5  5.0   PROTEIN  Negative  Negative   UROBILINOGEN  0.2   --    NITRITE  Negative  Negative   LEUKEST  Negative  Trace*   RBCU   --   <1   WBCU   --   2           PSA:     Post Void Residual:     Other labs: None today      IMPRESSION:  69 y/o M s/p robot-assisted laparoscopic prostatectomy     PLAN:  PSA in three months   Rx for sildenafil (for use as penile " rehabilitation)   Follow up in three months         Total Time: 15 minutes                                      Total in Consultation: greater than 50%     Discussed UUI, DEEPA, erectile dysfunction, today's PSA, PT, and sildenafil

## 2018-03-20 NOTE — LETTER
3/20/2018       RE: Shayne Fiore  [address]   [facility] [address]     Dear Colleague,    Thank you for referring your patient, Shayne Fiore, to the [facility] at [facility]. Please see a copy of my visit note below.    UROLOGIC DIAGNOSES:       CURRENT INTERVENTIONS:       HISTORY:     Patient presents for follow up after RALP on 07/13/2017.   Patient noted to have significant adhesions on RALP. Otherwise unremarkable.     Patient describes incontinence with urge to void. Controls this by clamping penis. He does not wear pads at present.   Patient notes mild DEEPA and some continued UUI. I discussed pelvic floor PT again. Patient would like to defer at present with concerns for modesty/Congregation reasons.     Also notes minimal erectile function. We discussed sildenafil with instructions for use as far as penile rehabilitation vs demand dose for erections.     Pathology revealed   FINAL DIAGNOSIS:   Prostate and bilateral pelvic lymph nodes, radical prostatectomy and   lymph node biopsy   - Prostatic adenocarcinoma, [place] grade 3+4=7, involving the bilateral   prostate and 30% of tissue volume   -Two pelvic lymph nodes negative for carcinoma (0/2)       PAST MEDICAL HISTORY:   Past Medical History:   Diagnosis Date     Prostate cancer (H) 02/2017       PAST SURGICAL HISTORY:   Past Surgical History:   Procedure Laterality Date     CATARACT IOL, RT/LT Right 02/15/2017    IOL explantation/ant vit/ACIOL     DAVINCI PROSTATECTOMY, LYMPHADENECTOMY N/A 7/13/2017    Procedure: DAVINCI PROSTATECTOMY, LYMPHADENECTOMY;  ROBOTIC ASSISTED BILATERAL PELVIC LYMPH NODE DISSECTION, RADICAL PUBIC PROSTATECTOMY NERVE SPARING,  COMPLEX LAPAROSCOPIC LYSIS OF ADHESIONS;  Surgeon: Bruno Vazquez MD;  Location: [facility] OR     EYE SURGERY Right     x 2, unsure what type of surgery     LAPAROSCOPIC LYSIS ADHESIONS N/A  "7/13/2017    Procedure: LAPAROSCOPIC LYSIS ADHESIONS;  COMPLEX LYSIS OF ADHESIONS;  Surgeon: Bruno Vazquez MD;  Location: [facility] OR     scrotal hematoma Right      TESTICLE SURGERY         FAMILY HISTORY:   Family History   Problem Relation Age of Onset     Glaucoma Sister      Glaucoma Cousin      Macular Degeneration No family hx of        SOCIAL HISTORY:   Social History   Substance Use Topics     Smoking status: Never Smoker     Smokeless tobacco: Never Used     Alcohol use Not on file       Current Outpatient Prescriptions   Medication     timolol (TIMOPTIC) 0.5 % ophthalmic solution     brimonidine (ALPHAGAN) 0.2 % ophthalmic solution     ASPIRIN PO     erythromycin (ROMYCIN) ophthalmic ointment     brimonidine-timolol (COMBIGAN) 0.2-0.5 % ophthalmic solution     sildenafil (REVATIO) 20 MG tablet     dorzolamide-timolol (COSOPT) 2-0.5 % ophthalmic solution     acetaminophen (TYLENOL) 325 MG tablet     neomycin-bacitracin-polymyxin (NEOSPORIN) 5-400-5000 ointment     Hypromellose (ARTIFICIAL TEARS OP)     No current facility-administered medications for this visit.          PHYSICAL EXAM:    /68 (BP Location: Right arm)  Pulse 66  Ht 1.892 m (6' 2.5\")  Wt 64.9 kg (143 lb)  BMI 18.11 kg/m2    HEENT: Normocephalic and atraumatic   Cardiac: Not done  Back/Flank: Not done  CNS/PNS: Not done  Respiratory: Normal non-labored breathing  Abdomen: Soft nontender, incisions healing well   Peripheral Vascular: Not done  Mental Status: Not done    Penis: Not done  Scrotal Skin: Not done  Testicles: Not done  Epididymis: Not done  Digital Rectal Exam:     Cystoscopy: Not done    Imaging: None    Urinalysis: UA RESULTS:  Recent Labs   Lab Test  04/20/17   1336  12/13/16   1700   COLOR  Yellow  Yellow   APPEARANCE  Clear  Slightly Cloudy   URINEGLC  Negative  Negative   URINEBILI  Negative  Negative   URINEKETONE  Negative  Negative   SG  1.020  1.018   UBLD  Small*  Small*   URINEPH  5.5  5.0   PROTEIN  " Negative  Negative   UROBILINOGEN  0.2   --    NITRITE  Negative  Negative   LEUKEST  Negative  Trace*   RBCU   --   <1   WBCU   --   2           PSA:     Post Void Residual:     Other labs: None today      IMPRESSION:  69 y/o M s/p robot-assisted laparoscopic prostatectomy     PLAN:  PSA in three months   Rx for sildenafil (for use as penile rehabilitation)   Follow up in three months         Total Time: 15 minutes                                      Total in Consultation: greater than 50%     Discussed UUI, DEEPA, erectile dysfunction, today's PSA, PT, and sildenafil     Again, thank you for allowing me to participate in the care of your patient.      Sincerely,    Dolly Granda MD

## 2018-03-20 NOTE — MR AVS SNAPSHOT
"              After Visit Summary   3/20/2018    Shayne Fiore    MRN: [id number]           Patient Information     Date Of Birth          1950        Visit Information        Provider Department      3/20/2018 6:45 AM Marcus Posada Sarah Margaret Ann, MD [facility]        Today's Diagnoses     Other specified transient cerebral ischemias    -  1    Radicular pain in right arm          Care Instructions    Check Blood Pressure away from clinic. If top number over 140 go to [facility] to start blood pressure medicines.    Cholesterol goal LDL < 100    Go straight to emergency room if new stroke symptoms happen    Dr Minor will call [place] with results.              Follow-ups after your visit        Additional Services     PHYSICAL THERAPY REFERRAL       *This therapy referral will be filtered to a centralized scheduling office at [facility] and the patient will receive a call to schedule an appointment at a [place] location most convenient for them. *     [facility] provides Physical Therapy evaluation and treatment and many specialty services across the [place] system.  If requesting a specialty program, please choose from the list below.    If you have not heard from the scheduling office within 2 business days, please call [phone] for all locations, with the exception of [place], please call [phone] and [facility], please call [phone]  Treatment: Evaluation & Treatment  Special Instructions/Modalities: Evaluate and treat  Special Programs: None    Please be aware that coverage of these services is subject to the terms and limitations of your health insurance plan.  Call member services at your health plan with any benefit or coverage questions.      **Note to Provider:  If you are referring outside of [place] for the therapy appointment, please list the name of the location in the \"special instructions\" above, print the " referral and give to the patient to schedule the appointment.                  Follow-up notes from your care team     Call patient with results Return if symptoms worsen or fail to improve.      Your next 10 appointments already scheduled     Mar 20, 2018  1:30 PM CDT   Return Visit with Dolly Granda MD   [facility] ([facility])    [address]  [address]  [facility] [address] [phone]            Mar 27, 2018  9:00 AM CDT   (Arrive by 8:45 AM)   HOLTER MONITOR VISIT with  Cvc Monitor [facility] ([facility])    [address]  [address]  [facility] [id number] [phone]            Mar 27, 2018 12:10 PM CDT   (Arrive by 11:55 AM)   BRENT Extremity with Mercy Whittaker PT   [place] [facility] ([facility]  )    [address] [place] [address] [phone]            Jun 14, 2018  1:45 PM CDT   RETURN CORNEA with Isai Varma MD   [facility] Clinic ([facility])    [address]  [facility] [id number] [phone]              Future tests that were ordered for you today     Open Future Orders        Priority Expected Expires Ordered    [employer] Holter Monitor Routine 3/27/2018 9/16/2018 3/20/2018            Who to contact     Please call your clinic at [phone] to:    Ask questions about your health    Make or cancel appointments    Discuss your medicines    Learn about your test results    Speak to your doctor            Additional Information About Your Visit        [employer] Information     [employer] is an electronic gateway that provides easy, online access to your medical records. With [employer], you can request a clinic appointment, read your test results, renew a prescription or communicate with your care team.     To sign up for [employer] visit the website  "at [email or web address]   You will be asked to enter the access code listed below, as well as some personal information. Please follow the directions to create your username and password.     Your access code is: [email or web address]  Expires: 2018  8:04 AM     Your access code will  in 90 days. If you need help or a new code, please contact your [facility] or call [phone] for assistance.        Care EveryWhere ID     This is your Care EveryWhere ID. This could be used by other organizations to access your [place] medical records  [id number]        Your Vitals Were     Pulse Height BMI (Body Mass Index)             56 1.892 m (6' 2.5\") 18.11 kg/m2          Blood Pressure from Last 3 Encounters:   18 161/75   18 147/64   17 112/64    Weight from Last 3 Encounters:   18 64.9 kg (143 lb)   18 65 kg (143 lb 6.4 oz)   17 63.5 kg (140 lb)              We Performed the Following     PHYSICAL THERAPY REFERRAL        Primary Care Provider Office Phone # Fax #    Juani SRIKANTH Choudhary [phone]       [facility]  [facility] [address]        Equal Access to Services     RACHELL BONDS : Hadii aad ku hadasho Soomaali, waaxda luqadaha, qaybta kaalmada adeegyada, macy jackson. So [facility] [phone].    ATENCIÓN: Si habla español, tiene a dillon disposición servicios gratuitos de asistencia lingüística. Teraame al [phone].    We comply with applicable federal civil rights laws and [place] laws. We do not discriminate on the basis of race, color, national origin, age, disability, sex, sexual orientation, or gender identity.            Thank you!     Thank you for choosing [facility] NEUROLOGY  for your care. Our goal is always to provide you with excellent care. Hearing back from our patients is one way we can continue to improve our services. Please take a few minutes to " complete the written survey that you may receive in the mail after your visit with us. Thank you!             Your Updated Medication List - Protect others around you: Learn how to safely use, store and throw away your medicines at www.disposemymeds.org.          This list is accurate as of 3/20/18  8:18 AM.  Always use your most recent med list.                   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       brimonidine-timolol 0.2-0.5 % ophthalmic solution    COMBIGAN    1 Bottle    Place 1 drop Into the left eye 2 times daily    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       dorzolamide-timolol 2-0.5 % ophthalmic solution    COSOPT    1 Bottle    Place 1 drop Into the left eye 2 times daily    Open-angle glaucoma of left eye, unspecified glaucoma stage, unspecified open-angle glaucoma type       erythromycin ophthalmic ointment    ROMYCIN    1 Tube    Place 0.25 inches into both eyes 4 times daily    Insufficiency of tear film of both eyes       neomycin-bacitracin-polymyxin 5-400-5000 ointment     14 g    Apply topically 2 times daily To tip of meatus for discomfort    Prostate cancer (H)       sildenafil 20 MG tablet    REVATIO    30 tablet    Take 3 pills as directed PRN    Prostate cancer (H)       timolol 0.5 % ophthalmic solution    TIMOPTIC    1 Bottle    Place 1 drop Into the left eye 2 times daily    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

## 2018-03-20 NOTE — LETTER
"3/20/2018       RE: Shayne Fiore  [address]   [facility] [address]     Dear Colleague,    Thank you for referring your patient, Shayne Fiore, to the [facility] NEUROLOG[facility] at [facility]. Please see a copy of my visit note below.        [facility]    Shayne Fiore MRN# [id number]   Age: 68 year old YOB: 1950     Requesting physician: Juani Haynes     Chief Complaint:    Referred by Dr. Choudhary and Dr. Carrizales for evaluation of a possible TIA  History of Present Illness:      Mr. Fiore is a 68-year-old gentleman who has recently moved to [place] from [place] originally from [place].  He is accompanied to his appointment by his son and examined with the aid of an .  He is referred for an evaluation of TIA.  The patient was at [facility] emergency department on February 26, 2018 for symptoms of left-sided face numbness, possible mild weakness and slurred speech that lasted for 30 minutes.  The symptoms began abruptly and ended abruptly with no accompanying symptoms of headache.  CT scan in the emergency department was negative.  The patient was discharged with a diagnosis of syncope.  Both the patient and the son are adamant that there was no syncope at the time.  The patient was seen by cardiology who also felt this was most consistent with a TIA and referred the patient to neurology.    The patient has had no further symptoms since then.  He is taking aspirin 325 daily.  He does have high blood pressure recordings today but thinks it might be due to being in a doctor's office.  He thinks his cholesterol screenings are up-to-date at [facility] but is not sure.    In addition the patient has a request to evaluate other symptoms.  He is describing \"nerve pain\" in his right arm that radiates from the shoulder down to the elbow and sometimes into the hand.  No clear trigger and he thinks " exercise often helps.  When the pain is present he feels weak but has not noticed it at other times.  He has found that it is hard to reach his arm over his opposite shoulder to scratch his back.    He has had no falls.    Past Medical History:   Diagnosis Date     Prostate cancer (H) 02/2017       Patient Active Problem List   Diagnosis     Primary open angle glaucoma of both eyes, severe stage     Senile nuclear sclerosis, left     Pseudophakia of right eye     Central pterygium, left     Dislocated IOL (intraocular lens), posterior, right     Anemia     Benign prostatic hyperplasia with urinary obstruction     Elevated prostate specific antigen (PSA)     Presence of intraocular lens     Nonspecific reaction to tuberculin test     Underweight     Prostate cancer (H)       Past Surgical History:   Procedure Laterality Date     CATARACT IOL, RT/LT Right 02/15/2017    IOL explantation/ant vit/ACIOL     DAVINCI PROSTATECTOMY, LYMPHADENECTOMY N/A 7/13/2017    Procedure: DAVINCI PROSTATECTOMY, LYMPHADENECTOMY;  ROBOTIC ASSISTED BILATERAL PELVIC LYMPH NODE DISSECTION, RADICAL PUBIC PROSTATECTOMY NERVE SPARING,  COMPLEX LAPAROSCOPIC LYSIS OF ADHESIONS;  Surgeon: Bruno Vazquez MD;  Location: [facility] OR     EYE SURGERY Right     x 2, unsure what type of surgery     LAPAROSCOPIC LYSIS ADHESIONS N/A 7/13/2017    Procedure: LAPAROSCOPIC LYSIS ADHESIONS;  COMPLEX LYSIS OF ADHESIONS;  Surgeon: Bruno Vazquez MD;  Location: [facility] OR     scrotal hematoma Right      TESTICLE SURGERY         Social History     Social History     Marital status:      Spouse name: N/A     Number of children: N/A     Years of education: N/A     Occupational History     Not on file.     Social History Main Topics     Smoking status: Never Smoker     Smokeless tobacco: Never Used     Alcohol use Not on file     Drug use: No     Sexual activity: Not on file     Other Topics Concern     Not on file     Social History Narrative  "      Family History   Problem Relation Age of Onset     Glaucoma Sister      Glaucoma Cousin      Macular Degeneration No family hx of        Current Outpatient Prescriptions   Medication Sig     timolol (TIMOPTIC) 0.5 % ophthalmic solution Place 1 drop Into the left eye 2 times daily     brimonidine (ALPHAGAN) 0.2 % ophthalmic solution Place 1 drop Into the left eye 3 times daily     ASPIRIN PO Take 325 mg by mouth daily     erythromycin (ROMYCIN) ophthalmic ointment Place 0.25 inches into both eyes 4 times daily     brimonidine-timolol (COMBIGAN) 0.2-0.5 % ophthalmic solution Place 1 drop Into the left eye 2 times daily     sildenafil (REVATIO) 20 MG tablet Take 3 pills as directed PRN     dorzolamide-timolol (COSOPT) 2-0.5 % ophthalmic solution Place 1 drop Into the left eye 2 times daily     acetaminophen (TYLENOL) 325 MG tablet Take 3 tablets (975 mg) by mouth every 8 hours     neomycin-bacitracin-polymyxin (NEOSPORIN) 5-400-5000 ointment Apply topically 2 times daily To tip of meatus for discomfort     Hypromellose (ARTIFICIAL TEARS OP) Apply 1 drop to eye daily as needed      No current facility-administered medications for this visit.    Aspirin 325 mg daily.    ROS: Please see HPI all other systems review and negative.    Physical Examination:  /75 (BP Location: Right arm, Patient Position: Sitting, Cuff Size: Adult Regular)  Pulse 56  Ht 1.892 m (6' 2.5\")  Wt 64.9 kg (143 lb)  BMI 18.11 kg/m2    Repeat /50  General Appearance:  The patient is well-groomed and cooperative with examination to the best of his ability.  He is in no acute distress  Neurological Examination  Cognition: oriented x3, attention and recall intact. No aphasia or dysarthria  Cranial Nerves: 2-12 intact except for reduced vision bilaterally secondary to glaucoma.  Funduscopic examination could not be accomplished.  In addition there is slight facial asymmetry with a mild eyelid ptosis on the right side but this is " long-standing.  General Motor Survey: Normal muscle bulk, tone and strength in all four ext. No tremor  Coordination: Finger to nose and heel knee shin normal bilaterally. Normal alternating movements.  Reflexes: Upper and lower extremity reflexes are within normal limits (+2) and bilaterally symmetric.   Sensory Examination:   Vibration: normal in all four ext   Pinprick:normal in all four ext  Gait: The patient uses a cane to help guide him secondary to visual loss.  He does appear stable on his feet but is tentative with his walking    Cardiovascular Examination:  Heart is regular in rate and rhythm to auscultation. No significant murmurs. No carotid bruits. No significant peripheral edema. Pedal pulses are palpable bilaterally.     Musculoskeletal Examination:  Neck is supple with full range of motion. No tenderness to palpitations.      Investigations:  Echocardiogram performed March 2, 2018 showed no evidence of any thrombus.  Left ventricular size is normal and wall thickness is normal.  No regional wall abnormalities were seen.  No shunt was seen at the atrial septal level with bubble study.    Ultrasound of the carotids performed on March 2, 2018 revealed no significant stenoses    Impression/Recommendations:  1. TIA  The patient symptoms of left-sided face numbness, weakness and some speech difficulties for 30 minutes are highly concerning for TIA.  No clear etiology has been found during workup.  I think a Zio patch for 7 days to rule out cardiac arrhythmia would be important.  Dr. Carrizales hesitated to order this because with the patient having visual impairment she was concerned about risk with anticoagulation and falls.  The patient is tentative but does not fall and I think it at least warrants a discussion if we find that he has atrial fibrillation.  I will call the patient's son with the results.    The patient is hypertensive on examination today.  I have asked him to have the blood pressure checked  outside of the clinic setting.  If it remains high with a systolic blood pressure over 140 or diastolic blood pressure over 90 he will need to see his primary care physician to be started on antihypertensive medications.  In addition if the patient has not had a recent lipid profile performed I would recommend repeating a fasting lipid profile and treating LDL is over 100 with a statin.  If lipid profile has been performed in the last 6 months there is no need to repeat but the goals change after someone has a TIA and this needs to be reviewed with his primary care physician.    2.  Right arm radicular pain  The patient is complaining of right arm radicular pain.  No apparent weakness on examination.  We will try conservative management with physical therapy initially and if the patient fails to improve send the patient for an MRI scan of the cervical spine.    Betty Minor MD [facility]  Department of Neurology

## 2018-03-20 NOTE — MR AVS SNAPSHOT
After Visit Summary   3/20/2018    Shayne Fiore    MRN: [id number]           Patient Information     Date Of Birth          1950        Visit Information        Provider Department      3/20/2018 1:30 PM Dolly Granda MD; [employer] [facility]        Today's Diagnoses     Urinary urgency    -  1    Malignant neoplasm of prostate (H)        Personal history of malignant neoplasm of prostate        Prostate cancer (H)           Follow-ups after your visit        Additional Services     [facility] Physical Therapy Referral       **This order will print in the [facility] Scheduling Office**    Physical Therapy, Hand Therapy and Chiropractic Care are available through:    *[facility]  *[facility]  *[facility]    Call one number to schedule at any of the above locations: [phone].    Your provider has referred you to: Physical Therapy at [facility] or [facility]    Indication/Reason for Referral: Men's Health (Please Complete Special Programs SmartList)  Onset of Illness:   Therapy Orders: Evaluate and Treat  Special Programs: Men's Health: Pelvic Floor Weakness/Incontinence - Specific Diagnosis: incontinence after radical prostatectomy   Special Request: Marva Cook      Additional Comments for the Therapist or Chiropractor:     Please be aware that coverage of these services is subject to the terms and limitations of your health insurance plan.  Call member services at your health plan with any benefit or coverage questions.      Please bring the following to your appointment:    *Your personal calendar for scheduling future appointments  *Comfortable clothing                  Follow-up notes from your care team     Return in about 3 months (around 6/20/2018) for SD PSA .      Your next 10 appointments already scheduled     Mar 27, 2018  9:00 AM CDRHONDA   (Arrive by 8:45 AM)   HOLTER  MONITOR VISIT with  Cvc Monitor [facility] ([facility])    [address]  [address]  [place] [address] [phone]            Mar 27, 2018 12:10 PM CDT   (Arrive by 11:55 AM)   BRENT Extremity with Mercy Whittaker PT   [facility] ([facility] [place]  )    [address] [place] [id number] [phone]            Jun 14, 2018  1:45 PM CDT   RETURN CORNEA with Isai Varma MD   Eye Clinic ([facility])    [address]  [address] [address]  [place] [address] [phone]            Jun 26, 2018 10:00 AM CDT   Return Visit with Dolly Granda MD   [facility] ([facility])    [address]  [address]  [place] [id number] [phone]              Future tests that were ordered for you today     Open Future Orders        Priority Expected Expires Ordered    [employer] Holter Monitor Routine 3/27/2018 9/16/2018 3/20/2018            Who to contact     If you have questions or need follow up information about today's clinic visit or your schedule please contact [facility] [place] directly at [phone].  Normal or non-critical lab and imaging results will be communicated to you by [employer], letter or phone within 4 business days after the clinic has received the results. If you do not hear from us within 7 days, please contact the clinic through [employer] or phone. If you have a critical or abnormal lab result, we will notify you by phone as soon as possible.  Submit refill requests through [employer] or call your pharmacy and they will forward the refill request to us. Please allow 3 business days for your refill to be completed.          Additional Information About Your Visit        [employer] Information     Dash[employer]st lets you send messages to your  "doctor, view your test results, renew your prescriptions, schedule appointments and more. To sign up, go to [email or web address] . Click on \"Log in\" on the left side of the screen, which will take you to the Welcome page. Then click on \"Sign up Now\" on the right side of the page.     You will be asked to enter the access code listed below, as well as some personal information. Please follow the directions to create your username and password.     Your access code is: [email or web address]  Expires: 2018  8:04 AM     Your access code will  in 90 days. If you need help or a new code, please call your [place] clinic or [phone].        Care EveryWhere ID     This is your Care EveryWhere ID. This could be used by other organizations to access your [place] medical records  [id number]        Your Vitals Were     Pulse Height BMI (Body Mass Index)             66 1.892 m (6' 2.5\") 18.11 kg/m2          Blood Pressure from Last 3 Encounters:   18 122/68   18 161/75   18 147/64    Weight from Last 3 Encounters:   18 64.9 kg (143 lb)   18 64.9 kg (143 lb)   18 65 kg (143 lb 6.4 oz)              We Performed the Following     BRENT Physical Therapy Referral     PSA Diag Urologic Phys     UA without Microscopic          Today's Medication Changes          These changes are accurate as of 3/20/18  2:43 PM.  If you have any questions, ask your nurse or doctor.               These medicines have changed or have updated prescriptions.        Dose/Directions    sildenafil 20 MG tablet   Commonly known as:  REVATIO   This may have changed:  additional instructions   Used for:  Prostate cancer (H)   Changed by:  Dolly Granda MD        Take every other day as needed for penile rehabilitation   Quantity:  30 tablet   Refills:  6            Where to get your medicines      Some of these will need a paper prescription and others can be bought over the counter.  Ask your nurse if " you have questions.     Bring a paper prescription for each of these medications     sildenafil 20 MG tablet                Primary Care Provider Office Phone # Fax #    Juani Choudhary [phone]       [facility] [address] [address]        Equal Access to Services     RACHELL BONDS : Hadii inder ku hadasho Soomaali, waaxda luqadaha, qaybta kaalmada adeegyada, waxay sukh hayalonn elida brittonmissycriss jackson. So [facility] [phone].    ATENCIÓN: Si habla español, tiene a dillon disposición servicios gratuitos de asistencia lingüística. Llame al [phone].    We comply with applicable federal civil rights laws and [place] laws. We do not discriminate on the basis of race, color, national origin, age, disability, sex, sexual orientation, or gender identity.            Thank you!     Thank you for choosing [facility] [place]  for your care. Our goal is always to provide you with excellent care. Hearing back from our patients is one way we can continue to improve our services. Please take a few minutes to complete the written survey that you may receive in the mail after your visit with us. Thank you!             Your Updated Medication List - Protect others around you: Learn how to safely use, store and throw away your medicines at www.disposemymeds.org.          This list is accurate as of 3/20/18  2:43 PM.  Always use your most recent med list.                   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        brimonidine-timolol 0.2-0.5 % ophthalmic solution    COMBIGAN    1 Bottle    Place 1 drop Into the left eye 2 times daily    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       dorzolamide-timolol 2-0.5 % ophthalmic solution    COSOPT    1 Bottle    Place 1 drop Into the left eye 2 times daily    Open-angle glaucoma of left eye, unspecified glaucoma stage, unspecified open-angle glaucoma type       erythromycin ophthalmic ointment    ROMYCIN    1 Tube    Place 0.25 inches into both eyes 4 times daily    Insufficiency of tear film of both eyes       neomycin-bacitracin-polymyxin 5-400-5000 ointment     14 g    Apply topically 2 times daily To tip of meatus for discomfort    Prostate cancer (H)       sildenafil 20 MG tablet    REVATIO    30 tablet    Take every other day as needed for penile rehabilitation    Prostate cancer (H)       timolol 0.5 % ophthalmic solution    TIMOPTIC    1 Bottle    Place 1 drop Into the left eye 2 times daily    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

## 2018-03-20 NOTE — PATIENT INSTRUCTIONS
Check Blood Pressure away from clinic. If top number over 140 go to [facility] to start blood pressure medicines.    Cholesterol goal LDL < 100    Go straight to emergency room if new stroke symptoms happen    Dr Minor will call [facility] with results.

## 2018-03-20 NOTE — PROGRESS NOTES
"    [facility]    Shayne Fiore MRN# [id number]   Age: 68 year old YOB: 1950     Requesting physician: Juani Haynes     Chief Complaint:    Referred by Dr. Choudhary and Dr. Carrizales for evaluation of a possible TIA  History of Present Illness:      Mr. Fiore is a 68-year-old gentleman who has recently moved to [place] from [place] originally from [place].  He is accompanied to his appointment by his son and examined with the aid of an .  He is referred for an evaluation of TIA.  The patient was at [facility] emergency department on February 26, 2018 for symptoms of left-sided face numbness, possible mild weakness and slurred speech that lasted for 30 minutes.  The symptoms began abruptly and ended abruptly with no accompanying symptoms of headache.  CT scan in the emergency department was negative.  The patient was discharged with a diagnosis of syncope.  Both the patient and the son are adamant that there was no syncope at the time.  The patient was seen by cardiology who also felt this was most consistent with a TIA and referred the patient to neurology.    The patient has had no further symptoms since then.  He is taking aspirin 325 daily.  He does have high blood pressure recordings today but thinks it might be due to being in a doctor's office.  He thinks his cholesterol screenings are up-to-date at [facility] but is not sure.    In addition the patient has a request to evaluate other symptoms.  He is describing \"nerve pain\" in his right arm that radiates from the shoulder down to the elbow and sometimes into the hand.  No clear trigger and he thinks exercise often helps.  When the pain is present he feels weak but has not noticed it at other times.  He has found that it is hard to reach his arm over his opposite shoulder to scratch his back.    He has had no falls.    Past Medical History:   Diagnosis Date     Prostate cancer (H) 02/2017 "       Patient Active Problem List   Diagnosis     Primary open angle glaucoma of both eyes, severe stage     Senile nuclear sclerosis, left     Pseudophakia of right eye     Central pterygium, left     Dislocated IOL (intraocular lens), posterior, right     Anemia     Benign prostatic hyperplasia with urinary obstruction     Elevated prostate specific antigen (PSA)     Presence of intraocular lens     Nonspecific reaction to tuberculin test     Underweight     Prostate cancer (H)       Past Surgical History:   Procedure Laterality Date     CATARACT IOL, RT/LT Right 02/15/2017    IOL explantation/ant vit/ACIOL     DAVINCI PROSTATECTOMY, LYMPHADENECTOMY N/A 7/13/2017    Procedure: DAVINCI PROSTATECTOMY, LYMPHADENECTOMY;  ROBOTIC ASSISTED BILATERAL PELVIC LYMPH NODE DISSECTION, RADICAL PUBIC PROSTATECTOMY NERVE SPARING,  COMPLEX LAPAROSCOPIC LYSIS OF ADHESIONS;  Surgeon: Bruno Vazquez MD;  Location: [facility] OR     EYE SURGERY Right     x 2, unsure what type of surgery     LAPAROSCOPIC LYSIS ADHESIONS N/A 7/13/2017    Procedure: LAPAROSCOPIC LYSIS ADHESIONS;  COMPLEX LYSIS OF ADHESIONS;  Surgeon: Bruno Vazquez MD;  Location: [facility] OR     scrotal hematoma Right      TESTICLE SURGERY         Social History     Social History     Marital status:      Spouse name: N/A     Number of children: N/A     Years of education: N/A     Occupational History     Not on file.     Social History Main Topics     Smoking status: Never Smoker     Smokeless tobacco: Never Used     Alcohol use Not on file     Drug use: No     Sexual activity: Not on file     Other Topics Concern     Not on file     Social History Narrative       Family History   Problem Relation Age of Onset     Glaucoma Sister      Glaucoma Cousin      Macular Degeneration No family hx of        Current Outpatient Prescriptions   Medication Sig     timolol (TIMOPTIC) 0.5 % ophthalmic solution Place 1 drop Into the left eye 2 times daily     brimonidine  "(ALPHAGAN) 0.2 % ophthalmic solution Place 1 drop Into the left eye 3 times daily     ASPIRIN PO Take 325 mg by mouth daily     erythromycin (ROMYCIN) ophthalmic ointment Place 0.25 inches into both eyes 4 times daily     brimonidine-timolol (COMBIGAN) 0.2-0.5 % ophthalmic solution Place 1 drop Into the left eye 2 times daily     sildenafil (REVATIO) 20 MG tablet Take 3 pills as directed PRN     dorzolamide-timolol (COSOPT) 2-0.5 % ophthalmic solution Place 1 drop Into the left eye 2 times daily     acetaminophen (TYLENOL) 325 MG tablet Take 3 tablets (975 mg) by mouth every 8 hours     neomycin-bacitracin-polymyxin (NEOSPORIN) 5-400-5000 ointment Apply topically 2 times daily To tip of meatus for discomfort     Hypromellose (ARTIFICIAL TEARS OP) Apply 1 drop to eye daily as needed      No current facility-administered medications for this visit.    Aspirin 325 mg daily.    ROS: Please see HPI all other systems review and negative.    Physical Examination:  /75 (BP Location: Right arm, Patient Position: Sitting, Cuff Size: Adult Regular)  Pulse 56  Ht 1.892 m (6' 2.5\")  Wt 64.9 kg (143 lb)  BMI 18.11 kg/m2    Repeat /50  General Appearance:  The patient is well-groomed and cooperative with examination to the best of his ability.  He is in no acute distress  Neurological Examination  Cognition: oriented x3, attention and recall intact. No aphasia or dysarthria  Cranial Nerves: 2-12 intact except for reduced vision bilaterally secondary to glaucoma.  Funduscopic examination could not be accomplished.  In addition there is slight facial asymmetry with a mild eyelid ptosis on the right side but this is long-standing.  General Motor Survey: Normal muscle bulk, tone and strength in all four ext. No tremor  Coordination: Finger to nose and heel knee shin normal bilaterally. Normal alternating movements.  Reflexes: Upper and lower extremity reflexes are within normal limits (+2) and bilaterally symmetric. "   Sensory Examination:   Vibration: normal in all four ext   Pinprick:normal in all four ext  Gait: The patient uses a cane to help guide him secondary to visual loss.  He does appear stable on his feet but is tentative with his walking    Cardiovascular Examination:  Heart is regular in rate and rhythm to auscultation. No significant murmurs. No carotid bruits. No significant peripheral edema. Pedal pulses are palpable bilaterally.     Musculoskeletal Examination:  Neck is supple with full range of motion. No tenderness to palpitations.      Investigations:  Echocardiogram performed March 2, 2018 showed no evidence of any thrombus.  Left ventricular size is normal and wall thickness is normal.  No regional wall abnormalities were seen.  No shunt was seen at the atrial septal level with bubble study.    Ultrasound of the carotids performed on March 2, 2018 revealed no significant stenoses    Impression/Recommendations:  1. TIA  The patient symptoms of left-sided face numbness, weakness and some speech difficulties for 30 minutes are highly concerning for TIA.  No clear etiology has been found during workup.  I think a Zio patch for 7 days to rule out cardiac arrhythmia would be important.  Dr. Carrizales hesitated to order this because with the patient having visual impairment she was concerned about risk with anticoagulation and falls.  The patient is tentative but does not fall and I think it at least warrants a discussion if we find that he has atrial fibrillation.  I will call the patient's son with the results.    The patient is hypertensive on examination today.  I have asked him to have the blood pressure checked outside of the clinic setting.  If it remains high with a systolic blood pressure over 140 or diastolic blood pressure over 90 he will need to see his primary care physician to be started on antihypertensive medications.  In addition if the patient has not had a recent lipid profile performed I would  recommend repeating a fasting lipid profile and treating LDL is over 100 with a statin.  If lipid profile has been performed in the last 6 months there is no need to repeat but the goals change after someone has a TIA and this needs to be reviewed with his primary care physician.    2.  Right arm radicular pain  The patient is complaining of right arm radicular pain.  No apparent weakness on examination.  We will try conservative management with physical therapy initially and if the patient fails to improve send the patient for an MRI scan of the cervical spine.    Betty Minor MD [facility]  Department of Neurology

## 2018-03-21 LAB
BACTERIA SPEC CULT: NO GROWTH
COPATH REPORT: NORMAL
Lab: NORMAL
SPECIMEN SOURCE: NORMAL

## 2018-03-27 ENCOUNTER — THERAPY VISIT (OUTPATIENT)
Dept: PHYSICAL THERAPY | Facility: CLINIC | Age: 68
End: 2018-03-27
Attending: PSYCHIATRY & NEUROLOGY
Payer: COMMERCIAL

## 2018-03-27 ENCOUNTER — ALLIED HEALTH/NURSE VISIT (OUTPATIENT)
Dept: CARDIOLOGY | Facility: CLINIC | Age: 68
End: 2018-03-27
Attending: PSYCHIATRY & NEUROLOGY
Payer: COMMERCIAL

## 2018-03-27 DIAGNOSIS — M25.511 ACUTE PAIN OF RIGHT SHOULDER: Primary | ICD-10-CM

## 2018-03-27 DIAGNOSIS — G45.8 OTHER SPECIFIED TRANSIENT CEREBRAL ISCHEMIAS: ICD-10-CM

## 2018-03-27 PROCEDURE — 97530 THERAPEUTIC ACTIVITIES: CPT | Mod: GP | Performed by: PHYSICAL THERAPIST

## 2018-03-27 PROCEDURE — 97110 THERAPEUTIC EXERCISES: CPT | Mod: GP | Performed by: PHYSICAL THERAPIST

## 2018-03-27 PROCEDURE — 97161 PT EVAL LOW COMPLEX 20 MIN: CPT | Mod: GP | Performed by: PHYSICAL THERAPIST

## 2018-03-27 PROCEDURE — 0298T ZZC EXT ECG > 48HR TO 21 DAY REVIEW AND INTERPRETATN: CPT | Performed by: INTERNAL MEDICINE

## 2018-03-27 NOTE — PROGRESS NOTES
[facility] Initial Evaluation        Subjective:  Patient is a 68 year old male presenting with rehab right shoulder hpi.   Shayne Fiore is a 68 year old male with a right shoulder condition.  Condition occurred with:  Repetition/overuse (Pt. has had a gradual onset of R sh pain x 1 month w/out incident. The pain increases with overhead lifting and reaching. Pt. has no hx of R UE injury.).  Condition occurred: for unknown reasons.  This is a new condition  2-27-18.    Patient reports pain:  Anterior.  Radiates to:  Upper arm.  Pain is described as sharp and is intermittent and reported as 5/10.  Associated symptoms:  Loss of motion/stiffness and loss of strength. Pain is the same all the time.  Symptoms are exacerbated by using arm overhead, using arm behind back, lifting and lying on extremity and relieved by rest.    Special tests:  X-ray.  Previous treatment: none.    General health as reported by patient is excellent.                                              Objective:  Standing Alignment:      Shoulder/UE:  Rounded shoulders                  Flexibility/Screens:   Positive screens:  ShoulderNegative screens: Cervical                              Shoulder Evaluation:  ROM:  AROM:    Flexion:  Left:  145    Right:  130    Abduction:  Left: 140   Right:  115    Internal Rotation:  Left:  75    Right:  70  External Rotation:  Left:  80    Right:  44                      Strength:    Flexion: Right: 4/5     Pain:     Abduction:  Right: 4/5     Pain:    Internal Rotation:  Right: 5/5     Pain:  External Rotation:   Right:4/5     Pain:        Elbow Flexion:  Right:5/5     Pain:  Elbow Extension:  Right:5/5     Pain:  Stability Testing:  normal      Special Tests:      Right shoulder positive for the following special tests:Impingement  Right shoulder negative for the following special tests:Labral and Rotator cuff tear  Palpation:      Right shoulder tenderness present at: Supraspinatus  and Infraspinatus  Mobility Tests:    Glenohumeral anterior right:  Hypomobile  Glenohumeral posterior right:  Hypomobile                                             General     ROS    Assessment/Plan:    Patient is a 68 year old male with right side shoulder complaints.    Patient has the following significant findings with corresponding treatment plan.                Diagnosis 1:  R shoulder pain  Pain -  self management and education  Decreased ROM/flexibility - manual therapy and therapeutic exercise  Decreased strength - therapeutic exercise and therapeutic activities  Impaired muscle performance - neuro re-education  Decreased function - therapeutic activities    Therapy Evaluation Codes:   1) History comprised of:   Personal factors that impact the plan of care:      None.    Comorbidity factors that impact the plan of care are:      None.     Medications impacting care: None.  2) Examination of Body Systems comprised of:   Body structures and functions that impact the plan of care:      Shoulder.   Activity limitations that impact the plan of care are:      Lifting and Sleeping.  3) Clinical presentation characteristics are:   Stable/Uncomplicated.  4) Decision-Making    Low complexity using standardized patient assessment instrument and/or measureable assessment of functional outcome.  Cumulative Therapy Evaluation is: Low complexity.    Previous and current functional limitations:  (See Goal Flow Sheet for this information)    Short term and Long term goals: (See Goal Flow Sheet for this information)     Communication ability:  Patient appears to be able to clearly communicate and understand verbal and written communication and follow directions correctly.  Treatment Explanation - The following has been discussed with the patient:   RX ordered/plan of care  Anticipated outcomes  Possible risks and side effects  This patient would benefit from PT intervention to resume normal activities.   Rehab potential  is good.    Frequency:  1 X week, once daily  Duration:  for 6 weeks  Discharge Plan:  Achieve all LTG.  Independent in home treatment program.  Reach maximal therapeutic benefit.    Please refer to the daily flowsheet for treatment today, total treatment time and time spent performing 1:1 timed codes.

## 2018-03-27 NOTE — MR AVS SNAPSHOT
After Visit Summary   3/27/2018    Shayne Fiore    MRN: [id number]           Patient Information     Date Of Birth          1950        Visit Information        Provider Department      3/27/2018 11:55 AM Mercy Whittaker PT; AVA JESSY [employer]SL[employer] [facility] Physical Therapy        Today's Diagnoses     Acute pain of right shoulder    -  1       Follow-ups after your visit        Your next 10 appointments already scheduled     Apr 10, 2018  8:10 AM CDT   BRENT Extremity with Mercy Whittaker PT   [facility]    [address] [place] [address]   [phone]            Jun 14, 2018  1:45 PM CDT   RETURN CORNEA with Isai Varma MD   Eye Clinic ([facility])    [address]  [facility] [address]   [phone]            Jun 26, 2018 10:00 AM CDT   Return Visit with Dolly Granda MD   [facility] [place] ([facility])    [address]  [address]  [facility] [address] [phone]              Who to contact     If you have questions or need follow up information about today's clinic visit or your schedule please contact [facility] PHYSICAL THERAPY directly at [phone].  Normal or non-critical lab and imaging results will be communicated to you by MyChart, letter or phone within 4 business days after the clinic has received the results. If you do not hear from us within 7 days, please contact the clinic through MyChart or phone. If you have a critical or abnormal lab result, we will notify you by phone as soon as possible.  Submit refill requests through [employer] or call your pharmacy and they will forward the refill request to us. Please allow 3 business days for your refill to be completed.           "Additional Information About Your Visit        MyChart Information     i[employer] lets you send messages to your doctor, view your test results, renew your prescriptions, schedule appointments and more. To sign up, go to [email or web address] . Click on \"Log in\" on the left side of the screen, which will take you to the Welcome page. Then click on \"Sign up Now\" on the right side of the page.     You will be asked to enter the access code listed below, as well as some personal information. Please follow the directions to create your username and password.     Your access code is: [email or web address]  Expires: 2018  8:04 AM     Your access code will  in 90 days. If you need help or a new code, please call your [place] clinic or [phone].        Care EveryWhere ID     This is your Care EveryWhere ID. This could be used by other organizations to access your [place] medical records  [id number]         Blood Pressure from Last 3 Encounters:   18 122/68   18 161/75   18 147/64    Weight from Last 3 Encounters:   18 64.9 kg (143 lb)   18 64.9 kg (143 lb)   18 65 kg (143 lb 6.4 oz)              We Performed the Following     BRENT Inital Eval Report     PT Eval, Low Complexity (38180)     Therapeutic Activities     Therapeutic Exercises        Primary Care Provider Office Phone # Fax #    Juani GroveSRIKANTH zee [phone]       [facility]  [facility] [address]        Equal Access to Services     [facility] CAMMIE : Hadii aad ku hadasho Soomaali, waaxda luqadaha, qaybta kaalmada iesha, macy jackson. So [facility] [phone].    ATENCIÓN: Si habla español, tiene a dillon disposición servicios gratuitos de asistencia lingüística. Llame al [phone].    We comply with applicable federal civil rights laws and [place] laws. We do not discriminate on the basis of race, color, national origin, age, disability, sex, " sexual orientation, or gender identity.            Thank you!     Thank you for choosing [facility] PH[facility]ICAL THERAP[facility]  for your care. Our goal is always to provide you with excellent care. Hearing back from our patients is one way we can continue to improve our services. Please take a few minutes to complete the written survey that you may receive in the mail after your visit with us. Thank you!             Your Updated Medication List - Protect others around you: Learn how to safely use, store and throw away your medicines at www.disposemymeds.org.          This list is accurate as of 3/27/18  3:32 PM.  Always use your most recent med list.                   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       brimonidine-timolol 0.2-0.5 % ophthalmic solution    COMBIGAN    1 Bottle    Place 1 drop Into the left eye 2 times daily    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       dorzolamide-timolol 2-0.5 % ophthalmic solution    COSOPT    1 Bottle    Place 1 drop Into the left eye 2 times daily    Open-angle glaucoma of left eye, unspecified glaucoma stage, unspecified open-angle glaucoma type       erythromycin ophthalmic ointment    ROMYCIN    1 Tube    Place 0.25 inches into both eyes 4 times daily    Insufficiency of tear film of both eyes       neomycin-bacitracin-polymyxin 5-400-5000 ointment     14 g    Apply topically 2 times daily To tip of meatus for discomfort    Prostate cancer (H)       sildenafil 20 MG tablet    REVATIO    30 tablet     Take every other day as needed for penile rehabilitation    Prostate cancer (H)       timolol 0.5 % ophthalmic solution    TIMOPTIC    1 Bottle    Place 1 drop Into the left eye 2 times daily    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

## 2018-03-27 NOTE — MR AVS SNAPSHOT
After Visit Summary   3/27/2018    Shayne Fiore    MRN: [id number]           Patient Information     Date Of Birth          1950        Visit Information        Provider Department      3/27/2018 8:45 AM Tech, Uc Cvc Monitor, TH; AVA TONG TRANSLATION SERVICES [facility]        Today's Diagnoses     Other specified transient cerebral ischemias           Follow-ups after your visit        Your next 10 appointments already scheduled     Apr 10, 2018  8:10 AM CDT   BRENT Extremity with Mercy Whittaker PT   [place] [facility] (BRENT [place]  )    [address] [place] [address] [phone]            Jun 14, 2018  1:45 PM CDT   RETURN CORNEA with Isai Varma MD   Eye Clinic ([facility])    [address]  [facility] [id number] [phone]            Jun 26, 2018 10:00 AM CDT   Return Visit with Dolly Granda MD   [facility] [place] ([facility])    [address]  [address]  [facility] [address] [phone]              Who to contact     If you have questions or need follow up information about today's clinic visit or your schedule please contact [facility] directly at [phone].  Normal or non-critical lab and imaging results will be communicated to you by MyChart, letter or phone within 4 business days after the clinic has received the results. If you do not hear from us within 7 days, please contact the clinic through MyChart or phone. If you have a critical or abnormal lab result, we will notify you by phone as soon as possible.  Submit refill requests through [employer] or call your pharmacy and they will forward the refill request to us. Please allow 3 business days for your refill to be completed.          Additional Information About Your Visit        MyChart Information      "[employer] lets you send messages to your doctor, view your test results, renew your prescriptions, schedule appointments and more. To sign up, go to [email or web address] . Click on \"Log in\" on the left side of the screen, which will take you to the Welcome page. Then click on \"Sign up Now\" on the right side of the page.     You will be asked to enter the access code listed below, as well as some personal information. Please follow the directions to create your username and password.     Your access code is: [email or web address]  Expires: 2018  8:04 AM     Your access code will  in 90 days. If you need help or a new code, please call your [place] clinic or [phone].        Care EveryWhere ID     This is your Care EveryWhere ID. This could be used by other organizations to access your [place] medical records  [id number]         Blood Pressure from Last 3 Encounters:   18 122/68   18 161/75   18 147/64    Weight from Last 3 Encounters:   18 64.9 kg (143 lb)   18 64.9 kg (143 lb)   18 65 kg (143 lb 6.4 oz)              We Performed the Following     Ziopatch Holter Monitor        Primary Care Provider Office Phone # Fax #    Juani GroveSRIKANTH zee [phone]       [facility] [address]        Equal Access to Services     RACHELL BONDS AH: Hadii aad ku hadasho Soomaali, waaxda luqadaha, qaybta kaalmada adeegyada, waxay idiin hayalonn elida almodovar . So [facility] [phone].    ATENCIÓN: Si habla español, tiene a dillon disposición servicios gratuitos de asistencia lingüística. Llame al [phone].    We comply with applicable federal civil rights laws and [place] laws. We do not discriminate on the basis of race, color, national origin, age, disability, sex, sexual orientation, or gender identity.            Thank you!     Thank you for choosing [facility]  for your care. Our goal is always to provide you " with excellent care. Hearing back from our patients is one way we can continue to improve our services. Please take a few minutes to complete the written survey that you may receive in the mail after your visit with us. Thank you!             Your Updated Medication List - Protect others around you: Learn how to safely use, store and throw away your medicines at www.disposemymeds.org.          This list is accurate as of 3/27/18 11:59 PM.  Always use your most recent med list.                   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       brimonidine-timolol 0.2-0.5 % ophthalmic solution    COMBIGAN    1 Bottle    Place 1 drop Into the left eye 2 times daily    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       dorzolamide-timolol 2-0.5 % ophthalmic solution    COSOPT    1 Bottle    Place 1 drop Into the left eye 2 times daily    Open-angle glaucoma of left eye, unspecified glaucoma stage, unspecified open-angle glaucoma type       erythromycin ophthalmic ointment    ROMYCIN    1 Tube    Place 0.25 inches into both eyes 4 times daily    Insufficiency of tear film of both eyes       neomycin-bacitracin-polymyxin 5-400-5000 ointment     14 g    Apply topically 2 times daily To tip of meatus for discomfort    Prostate cancer (H)       sildenafil 20 MG tablet    REVATIO    30 tablet    Take every other day as needed for penile rehabilitation    Prostate cancer (H)       timolol 0.5 % ophthalmic solution    TIMOPTIC    1 Bottle    Place 1 drop Into the left eye 2 times daily     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

## 2018-03-28 NOTE — NURSING NOTE
Per Betty Ramirez patient to have 7 days ziopatch monitor placed.  Diagnosis: Transient cerebral ischemia  Monitor placed: Yes  Patient Instructed: Yes  Patient verbalized understanding: Yes  Holter # [id number]  Placed By: Mandi Geronimo  Documented By: Domonique Saleem CMA

## 2018-04-10 ENCOUNTER — THERAPY VISIT (OUTPATIENT)
Dept: PHYSICAL THERAPY | Facility: CLINIC | Age: 68
End: 2018-04-10
Payer: COMMERCIAL

## 2018-04-10 DIAGNOSIS — M25.511 ACUTE PAIN OF RIGHT SHOULDER: ICD-10-CM

## 2018-04-10 PROCEDURE — 97110 THERAPEUTIC EXERCISES: CPT | Mod: GP | Performed by: PHYSICAL THERAPIST

## 2018-04-10 PROCEDURE — 97530 THERAPEUTIC ACTIVITIES: CPT | Mod: GP | Performed by: PHYSICAL THERAPIST

## 2018-04-10 NOTE — PROGRESS NOTES
Subjective:  HPI                    Objective:  System    Physical Exam    General     ROS    Assessment/Plan:    SUBJECTIVE  Subjective changes as noted by pt:   Pt. reports good progress with decreased R sh pain and improved mobility and strength. Pt. really likes the exercises and has been doing them at home consistently.   Current pain level:  2/10   Changes in function:  Yes (See Goal flowsheet attached for changes in current functional level)     Adverse reaction to treatment or activity:  None    OBJECTIVE  Changes in objective findings: AROM R sh flex 140; abd 130. Strength R sh flex 4/5; abd 4/5     ASSESSMENT  Abdikhalif continues to require intervention to meet STG and LTG's: PT  Patient's symptoms are resolving.  Response to therapy has shown an improvement in  pain level, ROM  and strength  Progress made towards STG/LTG?  Yes (See Goal flowsheet attached for updates on achievement of STG and LTG)    PLAN  Current treatment program is being advanced to more complex exercises.    PTA/ATC plan:  N/A    Please refer to the daily flowsheet for treatment today, total treatment time and time spent performing 1:1 timed codes.

## 2018-04-10 NOTE — MR AVS SNAPSHOT
After Visit Summary   4/10/2018    Shayne Fiore    MRN: [id number]           Patient Information     Date Of Birth          1950        Visit Information        Provider Department      4/10/2018 8:00 AM Mercy Whittaker PT; [employer] [facility]        Today's Diagnoses     Acute pain of right shoulder           Follow-ups after your visit        Your next 10 appointments already scheduled     Jun 14, 2018  1:45 PM CDT   RETURN CORNEA with Isai Varma MD   Eye Clinic ([facility])    [place] [address]  [address] [address]  [facility] [address]   [phone]            Jun 26, 2018 10:00 AM CDT   Return Visit with Dolly Granda MD   [facility] [place] ([facility] [place])    [address]  [address]  [facility] [address] [phone]              Who to contact     If you have questions or need follow up information about today's clinic visit or your schedule please contact [facility] directly at [phone].  Normal or non-critical lab and imaging results will be communicated to you by MyChart, letter or phone within 4 business days after the clinic has received the results. If you do not hear from us within 7 days, please contact the clinic through [employer] or phone. If you have a critical or abnormal lab result, we will notify you by phone as soon as possible.  Submit refill requests through [employer] or call your pharmacy and they will forward the refill request to us. Please allow 3 business days for your refill to be completed.          Additional Information About Your Visit        MyChart Information     [employer]! lets you send messages to your doctor, view your test results, renew your prescriptions, schedule appointments and more. To sign up, go to [email or web address]  ". Click on \"Log in\" on the left side of the screen, which will take you to the Welcome page. Then click on \"Sign up Now\" on the right side of the page.     You will be asked to enter the access code listed below, as well as some personal information. Please follow the directions to create your username and password.     Your access code is: [email or web address]  Expires: 2018  8:04 AM     Your access code will  in 90 days. If you need help or a new code, please call your [place] clinic or [phone].        Care EveryWhere ID     This is your Care EveryWhere ID. This could be used by other organizations to access your [place] medical records  [id number]         Blood Pressure from Last 3 Encounters:   18 122/68   18 161/75   18 147/64    Weight from Last 3 Encounters:   18 64.9 kg (143 lb)   18 64.9 kg (143 lb)   18 65 kg (143 lb 6.4 oz)              We Performed the Following     Therapeutic Activities     Therapeutic Exercises        Primary Care Provider Office Phone # Fax #    Juani GroveSRIKANTH zee [phone]       Critical access hospital  [facility] [address]        Equal Access to Services     RACHELL BONDS AH: Hadii inder ku hadasho Soomaali, waaxda luqadaha, qaybta kaalmada adeegyada, macy luz haypedro almodovar . So [facility] [phone].    ATENCIÓN: Si habla español, tiene a dillon disposición servicios gratuitos de asistencia lingüística. Llame al [phone].    We comply with applicable federal civil rights laws and [place] laws. We do not discriminate on the basis of race, color, national origin, age, disability, sex, sexual orientation, or gender identity.            Thank you!     Thank you for choosing [facility]  for your care. Our goal is always to provide you with excellent care. Hearing back from our patients is one way we can continue to improve our services. " Please take a few minutes to complete the written survey that you may receive in the mail after your visit with us. Thank you!             Your Updated Medication List - Protect others around you: Learn how to safely use, store and throw away your medicines at www.disposemymeds.org.          This list is accurate as of 4/10/18  2:03 PM.  Always use your most recent med list.                   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       brimonidine-timolol 0.2-0.5 % ophthalmic solution    COMBIGAN    1 Bottle    Place 1 drop Into the left eye 2 times daily    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       dorzolamide-timolol 2-0.5 % ophthalmic solution    COSOPT    1 Bottle    Place 1 drop Into the left eye 2 times daily    Open-angle glaucoma of left eye, unspecified glaucoma stage, unspecified open-angle glaucoma type       erythromycin ophthalmic ointment    ROMYCIN    1 Tube    Place 0.25 inches into both eyes 4 times daily    Insufficiency of tear film of both eyes       neomycin-bacitracin-polymyxin 5-400-5000 ointment     14 g    Apply topically 2 times daily To tip of meatus for discomfort    Prostate cancer (H)       sildenafil 20 MG tablet    REVATIO    30 tablet    Take every other day as needed for penile rehabilitation    Prostate cancer (H)       timolol 0.5 % ophthalmic solution    TIMOPTIC    1 Bottle    Place 1 drop Into the left eye 2 times daily    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

## 2018-04-16 ENCOUNTER — CARE COORDINATION (OUTPATIENT)
Dept: NEUROLOGY | Facility: CLINIC | Age: 68
End: 2018-04-16

## 2018-04-16 NOTE — PROGRESS NOTES
Betty Minor MD Seeb, Tracey, RN                     Please call patient's son and let him know the Zio patch was normal. No change to his current medication regimen. He should see the PCP in 3 months to recheck blood pressure and make sure cholesterol levels are normal.     Betty Minor MD [facility]   Department of Neurology       4/16/18:  Called [facility] and left a voice message with the above inormation per Dr. Minor..  I gave our contact information in case he has questions or concerns.

## 2018-06-22 DIAGNOSIS — C61 PROSTATE CANCER (H): Primary | ICD-10-CM

## 2019-04-30 ENCOUNTER — OFFICE VISIT (OUTPATIENT)
Dept: UROLOGY | Facility: CLINIC | Age: 69
End: 2019-04-30
Payer: COMMERCIAL

## 2019-04-30 VITALS — SYSTOLIC BLOOD PRESSURE: 126 MMHG | DIASTOLIC BLOOD PRESSURE: 60 MMHG

## 2019-04-30 DIAGNOSIS — C61 PROSTATE CANCER (H): Primary | ICD-10-CM

## 2019-04-30 DIAGNOSIS — N52.31 ERECTILE DYSFUNCTION AFTER RADICAL PROSTATECTOMY: ICD-10-CM

## 2019-04-30 PROCEDURE — 84153 ASSAY OF PSA TOTAL: CPT | Performed by: UROLOGY

## 2019-04-30 PROCEDURE — 36415 COLL VENOUS BLD VENIPUNCTURE: CPT | Performed by: UROLOGY

## 2019-04-30 PROCEDURE — 99213 OFFICE O/P EST LOW 20 MIN: CPT | Mod: 25 | Performed by: UROLOGY

## 2019-04-30 PROCEDURE — 51798 US URINE CAPACITY MEASURE: CPT | Performed by: UROLOGY

## 2019-04-30 PROCEDURE — 81003 URINALYSIS AUTO W/O SCOPE: CPT | Performed by: UROLOGY

## 2019-04-30 RX ORDER — TADALAFIL 5 MG/1
5 TABLET ORAL EVERY 24 HOURS
Qty: 30 TABLET | Refills: 3 | Status: SHIPPED | OUTPATIENT
Start: 2019-04-30

## 2019-04-30 RX ORDER — SILDENAFIL 100 MG/1
100 TABLET, FILM COATED ORAL DAILY PRN
Qty: 30 TABLET | Refills: 3 | Status: SHIPPED | OUTPATIENT
Start: 2019-04-30

## 2019-04-30 ASSESSMENT — PAIN SCALES - GENERAL: PAINLEVEL: NO PAIN (0)

## 2019-04-30 NOTE — NURSING NOTE
Chief Complaint   Patient presents with     RECHECK     Pt here for a three month f/u visit with sd psa   PVR= 21 mL  Starla Staton

## 2019-04-30 NOTE — LETTER
RE: Shayne Fiore  [address]  [facility] [address]     Dear Colleague,    Thank you for referring your patient, Shayne Fiore, to the [facility] at [facility]. Please see a copy of my visit note below.    UROLOGIC DIAGNOSES:       CURRENT INTERVENTIONS:       HISTORY:     Patient presents for follow up after RALP on 07/13/2017.   Patient noted to have significant adhesions on RALP. Otherwise unremarkable.     Patient is now continent.   Currently LYRIC with PSA < 0.04     Also notes minimal erectile function. Has not yet tried PDE5I     We discussed PSA results, continence, ED, PDE 5I use.       Pathology revealed   FINAL DIAGNOSIS:   Prostate and bilateral pelvic lymph nodes, radical prostatectomy and   lymph node biopsy   - Prostatic adenocarcinoma, Marco grade 3+4=7, involving the bilateral   prostate and 30% of tissue volume   -Two pelvic lymph nodes negative for carcinoma (0/2)       PAST MEDICAL HISTORY:   Past Medical History:   Diagnosis Date     Prostate cancer (H) 02/2017       PAST SURGICAL HISTORY:   Past Surgical History:   Procedure Laterality Date     CATARACT IOL, RT/LT Right 02/15/2017    IOL explantation/ant vit/ACIOL     DAVINCI PROSTATECTOMY, LYMPHADENECTOMY N/A 7/13/2017    Procedure: DAVINCI PROSTATECTOMY, LYMPHADENECTOMY;  ROBOTIC ASSISTED BILATERAL PELVIC LYMPH NODE DISSECTION, RADICAL PUBIC PROSTATECTOMY NERVE SPARING,  COMPLEX LAPAROSCOPIC LYSIS OF ADHESIONS;  Surgeon: Bruno Vazquez MD;  Location: [facility]     EYE SURGERY Right     x 2, unsure what type of surgery     LAPAROSCOPIC LYSIS ADHESIONS N/A 7/13/2017    Procedure: LAPAROSCOPIC LYSIS ADHESIONS;  COMPLEX LYSIS OF ADHESIONS;  Surgeon: Bruno Vazquez MD;  Location: [facility] OR     scrotal hematoma Right      TESTICLE SURGERY         FAMILY HISTORY:   Family History   Problem Relation Age of Onset     Glaucoma  Sister      Glaucoma Cousin      Macular Degeneration No family hx of        SOCIAL HISTORY:   Social History     Tobacco Use     Smoking status: Never Smoker     Smokeless tobacco: Never Used   Substance Use Topics     Alcohol use: Not on file       Current Outpatient Medications   Medication     brimonidine (ALPHAGAN) 0.2 % ophthalmic solution     brimonidine-timolol (COMBIGAN) 0.2-0.5 % ophthalmic solution     dorzolamide-timolol (COSOPT) 2-0.5 % ophthalmic solution     erythromycin (ROMYCIN) ophthalmic ointment     Hypromellose (ARTIFICIAL TEARS OP)     neomycin-bacitracin-polymyxin (NEOSPORIN) 5-400-5000 ointment     sildenafil (VIAGRA) 100 MG tablet     tadalafil (CIALIS) 5 MG tablet     timolol (TIMOPTIC) 0.5 % ophthalmic solution     acetaminophen (TYLENOL) 325 MG tablet     ASPIRIN PO     sildenafil (REVATIO) 20 MG tablet     No current facility-administered medications for this visit.      PHYSICAL EXAM:    /60     HEENT: Normocephalic and atraumatic   Cardiac: Not done  Back/Flank: Not done  CNS/PNS: Not done  Respiratory: Normal non-labored breathing  Abdomen: Soft nontender, incisions healing well   Peripheral Vascular: Not done  Mental Status: Not done    Penis: Not done  Scrotal Skin: Not done  Testicles: Not done  Epididymis: Not done  Digital Rectal Exam:     Cystoscopy: Not done    Imaging: None    Urinalysis: UA RESULTS:  Recent Labs   Lab Test  04/20/17   1336  12/13/16   1700   COLOR  Yellow  Yellow   APPEARANCE  Clear  Slightly Cloudy   URINEGLC  Negative  Negative   URINEBILI  Negative  Negative   URINEKETONE  Negative  Negative   SG  1.020  1.018   UBLD  Small*  Small*   URINEPH  5.5  5.0   PROTEIN  Negative  Negative   UROBILINOGEN  0.2   --    NITRITE  Negative  Negative   LEUKEST  Negative  Trace*   RBCU   --   <1   WBCU   --   2     PSA: <0.04    Post Void Residual:     Other labs: None today      IMPRESSION:  70 y/o M s/p robot-assisted laparoscopic prostatectomy     PLAN:  Rx for  sildenafil demand dose as well as tadalafil daily dose given with instructions   PSA in six months   Follow up in six months     Total Time: 15 minutes                                      Total in Consultation: greater than 50%     Discussed UUI, DEEPA, erectile dysfunction, today's PSA, PT, and silde    Again, thank you for allowing me to participate in the care of your patient.      Sincerely,    Dolly Granda MD

## 2019-05-03 NOTE — PROGRESS NOTES
UROLOGIC DIAGNOSES:       CURRENT INTERVENTIONS:       HISTORY:     Patient presents for follow up after RALP on 07/13/2017.   Patient noted to have significant adhesions on RALP. Otherwise unremarkable.     Patient is now continent.   Currently LYRIC with PSA < 0.04     Also notes minimal erectile function. Has not yet tried PDE5I     We discussed PSA results, continence, ED, PDE 5I use.       Pathology revealed   FINAL DIAGNOSIS:   Prostate and bilateral pelvic lymph nodes, radical prostatectomy and   lymph node biopsy   - Prostatic adenocarcinoma, Marco grade 3+4=7, involving the bilateral   prostate and 30% of tissue volume   -Two pelvic lymph nodes negative for carcinoma (0/2)       PAST MEDICAL HISTORY:   Past Medical History:   Diagnosis Date     Prostate cancer (H) 02/2017       PAST SURGICAL HISTORY:   Past Surgical History:   Procedure Laterality Date     CATARACT IOL, RT/LT Right 02/15/2017    IOL explantation/ant vit/ACIOL     DAVINCI PROSTATECTOMY, LYMPHADENECTOMY N/A 7/13/2017    Procedure: DAVINCI PROSTATECTOMY, LYMPHADENECTOMY;  ROBOTIC ASSISTED BILATERAL PELVIC LYMPH NODE DISSECTION, RADICAL PUBIC PROSTATECTOMY NERVE SPARING,  COMPLEX LAPAROSCOPIC LYSIS OF ADHESIONS;  Surgeon: Bruno Vazquez MD;  Location: [facility]     EYE SURGERY Right     x 2, unsure what type of surgery     LAPAROSCOPIC LYSIS ADHESIONS N/A 7/13/2017    Procedure: LAPAROSCOPIC LYSIS ADHESIONS;  COMPLEX LYSIS OF ADHESIONS;  Surgeon: Bruno Vazquez MD;  Location: [facility]     scrotal hematoma Right      TESTICLE SURGERY         FAMILY HISTORY:   Family History   Problem Relation Age of Onset     Glaucoma Sister      Glaucoma Cousin      Macular Degeneration No family hx of        SOCIAL HISTORY:   Social History     Tobacco Use     Smoking status: Never Smoker     Smokeless tobacco: Never Used   Substance Use Topics     Alcohol use: Not on file       Current Outpatient Medications   Medication     brimonidine (ALPHAGAN)  0.2 % ophthalmic solution     brimonidine-timolol (COMBIGAN) 0.2-0.5 % ophthalmic solution     dorzolamide-timolol (COSOPT) 2-0.5 % ophthalmic solution     erythromycin (ROMYCIN) ophthalmic ointment     Hypromellose (ARTIFICIAL TEARS OP)     neomycin-bacitracin-polymyxin (NEOSPORIN) 5-400-5000 ointment     sildenafil (VIAGRA) 100 MG tablet     tadalafil (CIALIS) 5 MG tablet     timolol (TIMOPTIC) 0.5 % ophthalmic solution     acetaminophen (TYLENOL) 325 MG tablet     ASPIRIN PO     sildenafil (REVATIO) 20 MG tablet     No current facility-administered medications for this visit.          PHYSICAL EXAM:    /60     HEENT: Normocephalic and atraumatic   Cardiac: Not done  Back/Flank: Not done  CNS/PNS: Not done  Respiratory: Normal non-labored breathing  Abdomen: Soft nontender, incisions healing well   Peripheral Vascular: Not done  Mental Status: Not done    Penis: Not done  Scrotal Skin: Not done  Testicles: Not done  Epididymis: Not done  Digital Rectal Exam:     Cystoscopy: Not done    Imaging: None    Urinalysis: UA RESULTS:  Recent Labs   Lab Test  04/20/17   1336  12/13/16   1700   COLOR  Yellow  Yellow   APPEARANCE  Clear  Slightly Cloudy   URINEGLC  Negative  Negative   URINEBILI  Negative  Negative   URINEKETONE  Negative  Negative   SG  1.020  1.018   UBLD  Small*  Small*   URINEPH  5.5  5.0   PROTEIN  Negative  Negative   UROBILINOGEN  0.2   --    NITRITE  Negative  Negative   LEUKEST  Negative  Trace*   RBCU   --   <1   WBCU   --   2           PSA: <0.04    Post Void Residual:     Other labs: None today      IMPRESSION:  68 y/o M s/p robot-assisted laparoscopic prostatectomy     PLAN:  Rx for sildenafil demand dose as well as tadalafil daily dose given with instructions   PSA in six months   Follow up in six months         Total Time: 15 minutes                                      Total in Consultation: greater than 50%     Discussed UUI, DEEPA, erectile dysfunction, today's PSA, PT, and silde

## 2019-08-09 ENCOUNTER — OFFICE VISIT (OUTPATIENT)
Dept: OPHTHALMOLOGY | Facility: CLINIC | Age: 69
End: 2019-08-09
Attending: OPHTHALMOLOGY
Payer: COMMERCIAL

## 2019-08-09 DIAGNOSIS — H11.022: ICD-10-CM

## 2019-08-09 DIAGNOSIS — H40.1133 PRIMARY OPEN ANGLE GLAUCOMA (POAG) OF BOTH EYES, SEVERE STAGE: Primary | ICD-10-CM

## 2019-08-09 DIAGNOSIS — Z96.1 PSEUDOPHAKIA OF RIGHT EYE: ICD-10-CM

## 2019-08-09 DIAGNOSIS — H53.9 VISION CHANGES: ICD-10-CM

## 2019-08-09 PROCEDURE — 92083 EXTENDED VISUAL FIELD XM: CPT | Mod: ZF | Performed by: STUDENT IN AN ORGANIZED HEALTH CARE EDUCATION/TRAINING PROGRAM

## 2019-08-09 PROCEDURE — 92133 CPTRZD OPH DX IMG PST SGM ON: CPT | Mod: ZF | Performed by: STUDENT IN AN ORGANIZED HEALTH CARE EDUCATION/TRAINING PROGRAM

## 2019-08-09 PROCEDURE — G0463 HOSPITAL OUTPT CLINIC VISIT: HCPCS | Mod: ZF

## 2019-08-09 ASSESSMENT — VISUAL ACUITY
OD_SC: 20/300
METHOD: SNELLEN - LINEAR
OS_SC: NLP

## 2019-08-09 ASSESSMENT — TONOMETRY
IOP_METHOD: TONOPEN
OD_IOP_MMHG: 13
OD_IOP_MMHG: 16
IOP_METHOD: TONOPEN
OS_IOP_MMHG: 46

## 2019-08-09 ASSESSMENT — CONF VISUAL FIELD
OS_INFERIOR_TEMPORAL_RESTRICTION: 1
OD_INFERIOR_NASAL_RESTRICTION: 3
OS_INFERIOR_NASAL_RESTRICTION: 1
OS_SUPERIOR_TEMPORAL_RESTRICTION: 1
OD_SUPERIOR_NASAL_RESTRICTION: 3
OD_INFERIOR_TEMPORAL_RESTRICTION: 3
OD_SUPERIOR_TEMPORAL_RESTRICTION: 3
OS_SUPERIOR_NASAL_RESTRICTION: 1

## 2019-08-09 ASSESSMENT — CUP TO DISC RATIO: OD_RATIO: 0.9

## 2019-08-09 ASSESSMENT — SLIT LAMP EXAM - LIDS: COMMENTS: NORMAL

## 2019-08-09 ASSESSMENT — EXTERNAL EXAM - RIGHT EYE: OD_EXAM: NORMAL

## 2019-08-09 ASSESSMENT — EXTERNAL EXAM - LEFT EYE: OS_EXAM: NORMAL

## 2019-08-09 NOTE — PATIENT INSTRUCTIONS
Patient will follow up with Dr. Borges for low vision.  Patient will return to clinic in 1-2 months with corneal pachymetry, Harris visual field test, dilated eye exam and disc photos.  Patient will start Genteal gel 6x/day in the left eye and artificial tears 4x/day in the right eye.

## 2019-08-09 NOTE — PROGRESS NOTES
CC: Left eye pain, FBS    HPI 69 M with hx of eye is often irritated with FBS.  He has not noted a decline in vision.  Right eye occasionally is painful.  He stopped using cosopt months ago.    Gtts:    (has not been using)     ATs PRN (using 1 x every day each eye)    A/P:  1)Endstage POAG -- s/p Trab OD, originally from [facility], etiology of vision loss OS -- K pachy: 456/480   Tmax: OS:64    HVF: OD:[place]      CDR: 0.9/no view OS    HRT/OCT: OD:Severe RNFL thinning      FHX of Glc: Unsure, possibly    Gonio:       Intolerant to:      Asthma/COPD: No  Steroid Use: No    Kidney Stones: No    Sulfa Allergy: No      IOP targets:  2)ACIOL OD -- H/O subluxed IOL OD s/p IOL explantation/ant vit/ACIOL right eye (2/15/17 with Dr. Varma)  3)Pterygium OS  4)?H/O Pterygium OD s/p removal  5)NS OS -- not visually significant   6)CARINE  7)NLP OS ?sahra 2/2 glaucoma     Patient will follow up with Dr. Borges for low vision.  Patient will return to clinic in 1-2 months with corneal pachymetry, Harris visual field test, dilated eye exam and disc photos.  Patient will start Genteal gel 6x/day in the left eye and artificial tears 4x/day in the right eye.    Resident note:   1. s/p IOL explantation/ant vit/ACIOL right eye (2/15/17)    2. Pterygium left eye   - could be related to FBS    3. CARINE left eye >> right eye  - increase PF AT      4. Advanced glaucoma each eye   - longstanding NLP vision left eye  - IOP high/mid-40s today; high pressures couldbe contributing to discomfort  - Glaucoma f/u next available; unclear vision potential    Dr Klein f/u 1 month    Sean Hernandez MD  Department of Ophthalmology - PGY3    Attending Physician Attestation:  Complete documentation of historical and exam elements from today's encounter can be found in the full encounter summary report (not reduplicated in this progress note). I personally obtained the chief complaint(s) and history of present illness.  I confirmed and edited as  necessary the review of systems, past medical/surgical history, family history, social history, and examination findings as documented by others; and I examined the patient myself. I personally reviewed the relevant tests, images, and reports as documented above. I formulated and edited as necessary the assessment and plan and discussed the findings and management plan with the patient and family.  - Marija Klein MD     I did not see this patient and was not involved in his care this visit.  -Narinder Mosley MD

## 2019-08-09 NOTE — NURSING NOTE
Patient hasn't been in to see an eye doctor since 2/18. States he has no changes in vision. Feeling itchiness and pain in left eye. Left eye tears when he feels pain in the eye. Has been using artificial tears only. Not using drops to lower pressure. Also not taking any systemic medications. Declines MR today.     Phyllis Renteria 8:38 AM

## 2019-10-31 ENCOUNTER — OFFICE VISIT (OUTPATIENT)
Dept: UROLOGY | Facility: CLINIC | Age: 69
End: 2019-10-31
Payer: MEDICAID

## 2019-10-31 VITALS
DIASTOLIC BLOOD PRESSURE: 62 MMHG | BODY MASS INDEX: 18.55 KG/M2 | HEIGHT: 73 IN | SYSTOLIC BLOOD PRESSURE: 126 MMHG | HEART RATE: 72 BPM | OXYGEN SATURATION: 98 % | WEIGHT: 140 LBS

## 2019-10-31 DIAGNOSIS — C61 PROSTATE CANCER (H): Primary | ICD-10-CM

## 2019-10-31 LAB — PSA SERPL-MCNC: <0.04 NG/ML (ref 0–4)

## 2019-10-31 PROCEDURE — 36415 COLL VENOUS BLD VENIPUNCTURE: CPT | Performed by: UROLOGY

## 2019-10-31 PROCEDURE — 84153 ASSAY OF PSA TOTAL: CPT | Performed by: UROLOGY

## 2019-10-31 PROCEDURE — 99213 OFFICE O/P EST LOW 20 MIN: CPT | Performed by: UROLOGY

## 2019-10-31 ASSESSMENT — PAIN SCALES - GENERAL: PAINLEVEL: NO PAIN (0)

## 2019-10-31 ASSESSMENT — MIFFLIN-ST. JEOR: SCORE: 1453.92

## 2019-10-31 NOTE — NURSING NOTE
Chief Complaint   Patient presents with     Follow Up     patient is here for 6 month follow up with PSA.      Aleah Potts, CMA

## 2019-10-31 NOTE — PROGRESS NOTES
UROLOGIC DIAGNOSES:       CURRENT INTERVENTIONS:       HISTORY:     Patient presents for follow up after RALP on 07/13/2017.   Patient noted to have significant adhesions on RALP. Otherwise unremarkable.     Patient is now continent.   Currently LYRIC with PSA < 0.04     Also notes minimal erectile function. Has not yet tried PDE5I     We discussed PSA results, continence, ED, PDE 5I use.       Pathology revealed   FINAL DIAGNOSIS:   Prostate and bilateral pelvic lymph nodes, radical prostatectomy and   lymph node biopsy   - Prostatic adenocarcinoma, Marco grade 3+4=7, involving the bilateral   prostate and 30% of tissue volume   -Two pelvic lymph nodes negative for carcinoma (0/2)       PAST MEDICAL HISTORY:   Past Medical History:   Diagnosis Date     Prostate cancer (H) 02/2017       PAST SURGICAL HISTORY:   Past Surgical History:   Procedure Laterality Date     CATARACT IOL, RT/LT Right 02/15/2017    IOL explantation/ant vit/ACIOL     DAVINCI PROSTATECTOMY, LYMPHADENECTOMY N/A 7/13/2017    Procedure: DAVINCI PROSTATECTOMY, LYMPHADENECTOMY;  ROBOTIC ASSISTED BILATERAL PELVIC LYMPH NODE DISSECTION, RADICAL PUBIC PROSTATECTOMY NERVE SPARING,  COMPLEX LAPAROSCOPIC LYSIS OF ADHESIONS;  Surgeon: Bruno Vazquez MD;  Location: [facility]     EYE SURGERY Right     x 2, unsure what type of surgery     LAPAROSCOPIC LYSIS ADHESIONS N/A 7/13/2017    Procedure: LAPAROSCOPIC LYSIS ADHESIONS;  COMPLEX LYSIS OF ADHESIONS;  Surgeon: Bruno Vazquez MD;  Location: [facility] OR     scrotal hematoma Right      TESTICLE SURGERY         FAMILY HISTORY:   Family History   Problem Relation Age of Onset     Glaucoma Sister      Glaucoma Cousin      Macular Degeneration No family hx of        SOCIAL HISTORY:   Social History     Tobacco Use     Smoking status: Never Smoker     Smokeless tobacco: Never Used   Substance Use Topics     Alcohol use: Not on file       Current Outpatient Medications   Medication     Hypromellose  "(ARTIFICIAL TEARS OP)     acetaminophen (TYLENOL) 325 MG tablet     ASPIRIN PO     brimonidine (ALPHAGAN) 0.2 % ophthalmic solution     brimonidine-timolol (COMBIGAN) 0.2-0.5 % ophthalmic solution     dorzolamide-timolol (COSOPT) 2-0.5 % ophthalmic solution     erythromycin (ROMYCIN) ophthalmic ointment     neomycin-bacitracin-polymyxin (NEOSPORIN) 5-400-5000 ointment     sildenafil (REVATIO) 20 MG tablet     sildenafil (VIAGRA) 100 MG tablet     tadalafil (CIALIS) 5 MG tablet     timolol (TIMOPTIC) 0.5 % ophthalmic solution     No current facility-administered medications for this visit.          PHYSICAL EXAM:    /62 (BP Location: Left arm, Patient Position: Sitting, Cuff Size: Adult Regular)   Pulse 72   Ht 1.854 m (6' 1\")   Wt 63.5 kg (140 lb)   SpO2 98%   BMI 18.47 kg/m      HEENT: Normocephalic and atraumatic   Cardiac: Not done  Back/Flank: Not done  CNS/PNS: Not done  Respiratory: Normal non-labored breathing  Abdomen: Soft nontender, incisions healing well   Peripheral Vascular: Not done  Mental Status: Not done    Penis: Not done  Scrotal Skin: Not done  Testicles: Not done  Epididymis: Not done  Digital Rectal Exam:     Cystoscopy: Not done    Imaging: None    Urinalysis: UA RESULTS:  Recent Labs   Lab Test  04/20/17   1336  12/13/16   1700   COLOR  Yellow  Yellow   APPEARANCE  Clear  Slightly Cloudy   URINEGLC  Negative  Negative   URINEBILI  Negative  Negative   URINEKETONE  Negative  Negative   SG  1.020  1.018   UBLD  Small*  Small*   URINEPH  5.5  5.0   PROTEIN  Negative  Negative   UROBILINOGEN  0.2   --    NITRITE  Negative  Negative   LEUKEST  Negative  Trace*   RBCU   --   <1   WBCU   --   2           PSA: <0.04    Post Void Residual:     Other labs: None today      IMPRESSION:  68 y/o M s/p robot-assisted laparoscopic prostatectomy     PLAN:    PSA in six months   Follow up in six months         Total Time: 15 minutes                                      Total in Consultation: greater " than 50%     Discussed UUI, DEEPA, erectile dysfunction, today's PSA

## 2019-10-31 NOTE — LETTER
10/31/2019       RE: Shayne Fiore  [address]  [facility] [address]     Dear Colleague,    Thank you for referring your patient, Shayne Fiore, to the [facility] at [facility]. Please see a copy of my visit note below.    UROLOGIC DIAGNOSES:       CURRENT INTERVENTIONS:       HISTORY:     Patient presents for follow up after RALP on 07/13/2017.   Patient noted to have significant adhesions on RALP. Otherwise unremarkable.     Patient is now continent.   Currently LYRIC with PSA < 0.04     Also notes minimal erectile function. Has not yet tried PDE5I     We discussed PSA results, continence, ED, PDE 5I use.       Pathology revealed   FINAL DIAGNOSIS:   Prostate and bilateral pelvic lymph nodes, radical prostatectomy and   lymph node biopsy   - Prostatic adenocarcinoma, [place] grade 3+4=7, involving the bilateral   prostate and 30% of tissue volume   -Two pelvic lymph nodes negative for carcinoma (0/2)       PAST MEDICAL HISTORY:   Past Medical History:   Diagnosis Date     Prostate cancer (H) 02/2017       PAST SURGICAL HISTORY:   Past Surgical History:   Procedure Laterality Date     CATARACT IOL, RT/LT Right 02/15/2017    IOL explantation/ant vit/ACIOL     DAVINCI PROSTATECTOMY, LYMPHADENECTOMY N/A 7/13/2017    Procedure: DAVINCI PROSTATECTOMY, LYMPHADENECTOMY;  ROBOTIC ASSISTED BILATERAL PELVIC LYMPH NODE DISSECTION, RADICAL PUBIC PROSTATECTOMY NERVE SPARING,  COMPLEX LAPAROSCOPIC LYSIS OF ADHESIONS;  Surgeon: Bruno Vazquez MD;  Location:  OR     EYE SURGERY Right     x 2, unsure what type of surgery     LAPAROSCOPIC LYSIS ADHESIONS N/A 7/13/2017    Procedure: LAPAROSCOPIC LYSIS ADHESIONS;  COMPLEX LYSIS OF ADHESIONS;  Surgeon: Bruno Vazquez MD;  Location: [facility]     scrotal hematoma Right      TESTICLE SURGERY         FAMILY HISTORY:   Family History   Problem Relation Age of Onset  "    Glaucoma Sister      Glaucoma Cousin      Macular Degeneration No family hx of        SOCIAL HISTORY:   Social History     Tobacco Use     Smoking status: Never Smoker     Smokeless tobacco: Never Used   Substance Use Topics     Alcohol use: Not on file       Current Outpatient Medications   Medication     Hypromellose (ARTIFICIAL TEARS OP)     acetaminophen (TYLENOL) 325 MG tablet     ASPIRIN PO     brimonidine (ALPHAGAN) 0.2 % ophthalmic solution     brimonidine-timolol (COMBIGAN) 0.2-0.5 % ophthalmic solution     dorzolamide-timolol (COSOPT) 2-0.5 % ophthalmic solution     erythromycin (ROMYCIN) ophthalmic ointment     neomycin-bacitracin-polymyxin (NEOSPORIN) 5-400-5000 ointment     sildenafil (REVATIO) 20 MG tablet     sildenafil (VIAGRA) 100 MG tablet     tadalafil (CIALIS) 5 MG tablet     timolol (TIMOPTIC) 0.5 % ophthalmic solution     No current facility-administered medications for this visit.          PHYSICAL EXAM:    /62 (BP Location: Left arm, Patient Position: Sitting, Cuff Size: Adult Regular)   Pulse 72   Ht 1.854 m (6' 1\")   Wt 63.5 kg (140 lb)   SpO2 98%   BMI 18.47 kg/m       HEENT: Normocephalic and atraumatic   Cardiac: Not done  Back/Flank: Not done  CNS/PNS: Not done  Respiratory: Normal non-labored breathing  Abdomen: Soft nontender, incisions healing well   Peripheral Vascular: Not done  Mental Status: Not done    Penis: Not done  Scrotal Skin: Not done  Testicles: Not done  Epididymis: Not done  Digital Rectal Exam:     Cystoscopy: Not done    Imaging: None    Urinalysis: UA RESULTS:  Recent Labs   Lab Test  04/20/17   1336  12/13/16   1700   COLOR  Yellow  Yellow   APPEARANCE  Clear  Slightly Cloudy   URINEGLC  Negative  Negative   URINEBILI  Negative  Negative   URINEKETONE  Negative  Negative   SG  1.020  1.018   UBLD  Small*  Small*   URINEPH  5.5  5.0   PROTEIN  Negative  Negative   UROBILINOGEN  0.2   --    NITRITE  Negative  Negative   LEUKEST  Negative  Trace*   RBCU "   --   <1   WBCU   --   2           PSA: <0.04    Post Void Residual:     Other labs: None today      IMPRESSION:  70 y/o M s/p robot-assisted laparoscopic prostatectomy     PLAN:    PSA in six months   Follow up in six months         Total Time: 15 minutes                                      Total in Consultation: greater than 50%     Discussed UUI, DEEPA, erectile dysfunction, today's PSA    Again, thank you for allowing me to participate in the care of your patient.      Sincerely,    Dolly Granda MD

## 2020-05-08 ENCOUNTER — CARE COORDINATION (OUTPATIENT)
Dept: NEUROLOGY | Facility: CLINIC | Age: 70
End: 2020-05-08

## 2020-05-08 ENCOUNTER — TELEPHONE (OUTPATIENT)
Dept: NURSING | Facility: CLINIC | Age: 70
End: 2020-05-08

## 2020-05-08 NOTE — PROGRESS NOTES
Agree with triage recommendation to seek ER care now. Per triage note pt is in agreement to go to the ER.    Moriah DIALLO   negative...

## 2020-05-08 NOTE — TELEPHONE ENCOUNTER
Agree with triage recommendation to seek ED care now, of which is documented that patient will comply. Defer to neuro for any other recommendations.

## 2020-05-08 NOTE — TELEPHONE ENCOUNTER
"  [facility]: Nurse Triage Note  SITUATION/BACKGROUND                                                      Negative for travel screen or Covid19 symptoms.   Shayne Fiore is a 70 year old male with hx of \" transient cerebral ischemias\" son (Nelson) calls to report patient having experienced electric shocks in his head last night and inability to speak for 3-4  minutes. Patient is legally blind and other brother was with him during this time.  Able to swallow his own saliva. Voiding okay.    Today, patient has slurred speech and left sided weakness from head to toe. He related to his son a feeling of heaviness on the left side of jaw and neck. Denies any chest pain or symptoms of protocol.    Per protocol, this nurse instructed son to seek ED care now.   Routed to Neurology and Cardiology as High Priority to review and follow up call.       RECOMMENDATION/PLAN                                                      RECOMMENDED DISPOSITION: seek ED care now  Will comply with recommendation: Yes    If further questions/concerns or if symptoms do not improve, worsen or new symptoms develop, call your PCP or [phone] to talk with the Resident on call, as soon as possible.    Guideline used: Neurological Symptoms   Telephone Triage Protocols for Nurses, Fifth Edition, Pooja Jackson, RN, RN  "

## 2020-06-17 NOTE — PROGRESS NOTES
HPI:  Patient complains of pain in the left eye > right eye. There is some tearing. The redness in the left eye has been going on for 2 weeks. He uses refresh pm 2 times daily.       Pertinent Medical History:    Prostate cancer    Anemia    Ocular History:    Endstage POAG    S/P trab right eye.     ACIOL right eye 02/15/17 with Dr. Varma. H/O subluxed IOL right eye.     Pterygium left eye.     Pterygium right eye. S/P removal.     Dry Eye Syndrome both eyes    NLP left eye - likely due to glaucoma    Eye Medications:    Artificial tears 4 times daily right eye.     Cosopt 2 times per day left eye - not using    Assessment and Plan:  1.   Blind Painful Eye with Possible Vitritis, left eye.     B-scan done today 06/18/2020 - shows possible vitritis in the left eye.     See retina today.     2.   Epithelial Defect, right eye / Punctate Epithelia Erosion, left eye.     Start erythromycin ointment 4 times daily, right eye.     Start artificial tear ointment every 2 hours, left eye.     3.   Endstage POAG    NLP left eye - likely due to glaucoma    S/P trab right eye.     Pach's: 456/480    Tmax left eye: 64.     4.   ACIOL, right eye.     H/O subluxed IOL right eye. H/O IOL explantation/ACIOL right eye 02/15/17 Dr. Varma.     5.   Pterygium, left eye.     Start artificial tear ointment every 2 hours, left eye.         Medical History:  Past Medical History:   Diagnosis Date     Prostate cancer (H) 02/2017       Medications:  Current Outpatient Medications   Medication Sig Dispense Refill     acetaminophen (TYLENOL) 325 MG tablet Take 3 tablets (975 mg) by mouth every 8 hours (Patient not taking: Reported on 4/30/2019) 100 tablet 0     ASPIRIN PO Take 325 mg by mouth daily       brimonidine (ALPHAGAN) 0.2 % ophthalmic solution Place 1 drop Into the left eye 3 times daily (Patient not taking: Reported on 10/31/2019) 1 Bottle 11     brimonidine-timolol (COMBIGAN) 0.2-0.5 % ophthalmic solution Place 1 drop Into the left eye  2 times daily (Patient not taking: Reported on 10/31/2019) 1 Bottle 11     dorzolamide-timolol (COSOPT) 2-0.5 % ophthalmic solution Place 1 drop Into the left eye 2 times daily (Patient not taking: Reported on 10/31/2019) 1 Bottle 11     erythromycin (ROMYCIN) ophthalmic ointment Place 0.25 inches into both eyes 4 times daily (Patient not taking: Reported on 10/31/2019) 1 Tube 11     Hypromellose (ARTIFICIAL TEARS OP) Apply 1 drop to eye daily as needed        neomycin-bacitracin-polymyxin (NEOSPORIN) 5-400-5000 ointment Apply topically 2 times daily To tip of meatus for discomfort (Patient not taking: Reported on 10/31/2019) 14 g 0     sildenafil (REVATIO) 20 MG tablet Take every other day as needed for penile rehabilitation (Patient not taking: Reported on 10/31/2019) 30 tablet 6     sildenafil (VIAGRA) 100 MG tablet Take 1 tablet (100 mg) by mouth daily as needed (erectile dysfunction) (Patient not taking: Reported on 10/31/2019) 30 tablet 3     tadalafil (CIALIS) 5 MG tablet Take 1 tablet (5 mg) by mouth every 24 hours (Patient not taking: Reported on 10/31/2019) 30 tablet 3     timolol (TIMOPTIC) 0.5 % ophthalmic solution Place 1 drop Into the left eye 2 times daily (Patient not taking: Reported on 10/31/2019) 1 Bottle 11   Complete documentation of historical and exam elements from today's encounter can be found in the full encounter summary report (not reduplicated in this progress note). I personally obtained the chief complaint(s) and history of present illness.  I confirmed and edited as necessary the review of systems, past medical/surgical history, family history, social history, and examination findings as documented by others; and I examined the patient myself. I personally reviewed the relevant tests, images, and reports as documented above. I formulated and edited as necessary the assessment and plan and discussed the findings and management plan with the patient and family. - Oneal Nickerson OD

## 2020-06-18 ENCOUNTER — OFFICE VISIT (OUTPATIENT)
Dept: OPHTHALMOLOGY | Facility: CLINIC | Age: 70
End: 2020-06-18
Attending: OPHTHALMOLOGY
Payer: COMMERCIAL

## 2020-06-18 ENCOUNTER — OFFICE VISIT (OUTPATIENT)
Dept: OPHTHALMOLOGY | Facility: CLINIC | Age: 70
End: 2020-06-18
Attending: OPTOMETRIST
Payer: COMMERCIAL

## 2020-06-18 DIAGNOSIS — H04.123 DRY EYE SYNDROME, BILATERAL: ICD-10-CM

## 2020-06-18 DIAGNOSIS — H11.022 CENTRAL PTERYGIUM OF LEFT EYE: ICD-10-CM

## 2020-06-18 DIAGNOSIS — H11.022: ICD-10-CM

## 2020-06-18 DIAGNOSIS — H25.13 AGE-RELATED NUCLEAR CATARACT, BILATERAL: Primary | ICD-10-CM

## 2020-06-18 DIAGNOSIS — H18.30 CORNEAL EPITHELIAL DEFECT: ICD-10-CM

## 2020-06-18 DIAGNOSIS — H40.1133 PRIMARY OPEN ANGLE GLAUCOMA (POAG) OF BOTH EYES, SEVERE STAGE: ICD-10-CM

## 2020-06-18 DIAGNOSIS — H18.459 SALZMANN'S NODULAR DEGENERATION: Primary | ICD-10-CM

## 2020-06-18 PROCEDURE — 40000269 ZZH STATISTIC NO CHARGE FACILITY FEE: Mod: ZF

## 2020-06-18 PROCEDURE — 68761 CLOSE TEAR DUCT OPENING: CPT | Mod: ZF | Performed by: OPHTHALMOLOGY

## 2020-06-18 PROCEDURE — 76512 OPH US DX B-SCAN: CPT | Mod: ZF | Performed by: OPTOMETRIST

## 2020-06-18 PROCEDURE — G0463 HOSPITAL OUTPT CLINIC VISIT: HCPCS | Mod: ZF

## 2020-06-18 PROCEDURE — 92285 EXTERNAL OCULAR PHOTOGRAPHY: CPT | Mod: ZF | Performed by: OPTOMETRIST

## 2020-06-18 RX ORDER — ERYTHROMYCIN 5 MG/G
0.5 OINTMENT OPHTHALMIC 4 TIMES DAILY
Qty: 1 TUBE | Refills: 3 | Status: CANCELLED | OUTPATIENT
Start: 2020-06-18

## 2020-06-18 RX ORDER — ERYTHROMYCIN 5 MG/G
OINTMENT OPHTHALMIC
Qty: 1 TUBE | Refills: 11 | Status: SHIPPED | OUTPATIENT
Start: 2020-06-18

## 2020-06-18 RX ORDER — DORZOLAMIDE HYDROCHLORIDE AND TIMOLOL MALEATE 20; 5 MG/ML; MG/ML
1 SOLUTION/ DROPS OPHTHALMIC 2 TIMES DAILY
Qty: 1 BOTTLE | Refills: 3 | Status: CANCELLED | OUTPATIENT
Start: 2020-06-18

## 2020-06-18 RX ORDER — ATROPINE SULFATE 10 MG/ML
1-2 SOLUTION/ DROPS OPHTHALMIC DAILY
Qty: 1 BOTTLE | Refills: 0 | Status: SHIPPED | OUTPATIENT
Start: 2020-06-18

## 2020-06-18 ASSESSMENT — REFRACTION_WEARINGRX
OD_ADD: +3.00
OD_SPHERE: -6.50
OD_AXIS: 100
OD_AXIS: 100
OD_CYLINDER: +8.00
OD_CYLINDER: +8.00
OD_ADD: +3.00
SPECS_TYPE: BIFOCAL
OD_SPHERE: -6.50
OS_SPHERE: BALANCE
OS_SPHERE: BALANCE
SPECS_TYPE: BIFOCAL

## 2020-06-18 ASSESSMENT — CONF VISUAL FIELD
OS_INFERIOR_TEMPORAL_RESTRICTION: 1
OS_INFERIOR_NASAL_RESTRICTION: 1
OS_SUPERIOR_NASAL_RESTRICTION: 1
OD_NORMAL: 1
OS_SUPERIOR_NASAL_RESTRICTION: 1
OS_SUPERIOR_TEMPORAL_RESTRICTION: 1
OS_SUPERIOR_TEMPORAL_RESTRICTION: 1
OS_INFERIOR_NASAL_RESTRICTION: 1
OS_INFERIOR_TEMPORAL_RESTRICTION: 1

## 2020-06-18 ASSESSMENT — TONOMETRY
OD_IOP_MMHG: 13
OS_IOP_MMHG: 42
IOP_METHOD: TONOPEN
OD_IOP_MMHG: 13
IOP_METHOD: TONOPEN
OS_IOP_MMHG: 42

## 2020-06-18 ASSESSMENT — VISUAL ACUITY
METHOD: SNELLEN - LINEAR
OS_CC: NLP
CORRECTION_TYPE: GLASSES
OD_CC: 20/100
OS_CC: NLP
OD_CC: 20/100
METHOD: SNELLEN - LINEAR

## 2020-06-18 ASSESSMENT — CUP TO DISC RATIO
OD_RATIO: 0.9
OD_RATIO: 0.9

## 2020-06-18 ASSESSMENT — SLIT LAMP EXAM - LIDS
COMMENTS: BLEPHARITIS
COMMENTS: BLEPHARITIS

## 2020-06-18 ASSESSMENT — EXTERNAL EXAM - RIGHT EYE
OD_EXAM: NORMAL
OD_EXAM: NORMAL

## 2020-06-18 ASSESSMENT — PACHYMETRY
OS_CT(UM): 480
OD_CT(UM): 456
EXAM_DATE: 12/13/2016

## 2020-06-18 ASSESSMENT — EXTERNAL EXAM - LEFT EYE
OS_EXAM: NORMAL
OS_EXAM: NORMAL

## 2020-06-18 NOTE — PROGRESS NOTES
CC:  evaluation and treat of a blind and painful left eye  HPI: Shyane Fiore is a  70 year old year-old patient referred by Dr Nickerson for evaluation and treat of a blind and painful left eye. Pt has had 2 weeks of progressive left eye pain described as sharp with FBS. He endorses relief with proparacaine today. No headache, nausea, or vomiting. Denies fever, chills. Reports chronic cough for years on and off. No diagnosis of asthma or COPD    Pt's current drops:   Refresh PM BID each eye   PF AT's QID each eye     Pt reports left eye lost complete vision of left eye 2013. Documented NLP here since 2016.    OCULAR HISTORY  Endstage POAG  S/P trab right eye.   ACIOL right eye 02/15/17 with Dr. Varma. H/O subluxed IOL right eye.   Pterygium left eye.   Pterygium right eye. S/P removal.   Dry Eye Syndrome both eyes  NLP left eye - likely due to glaucoma       Retinal Imaging:  U/S 6-18-20  LE: Retina attached, ONH cupped; highly mobile membrane c/w PVD noted, moderately dense clumped debris. Negative for other patent tears or holes, negative for other masses, lesions or effusions.    Assessment & Plan:    1. Blind painful eye, left eye    B scan with hyperechoic debri which I favor likely PVD; vitritis less likely.   Nature of pain described as sharp and FBS relieved with proparacaine; no AC inflmmation   IOP 42, K with mild MCE. No headache, NV that would typically accompany pain from high IOP. Still, will add topical IOP lowering medication     2. Pterygium, left eye   Appears enflamed. Likely source of pain. Some prominent scleral vessels post dilation, though doubt scleritis due to nature of pain and relief with proparacaine.   Increase PF AT to Q1-2 hours both eyes    Increase Refresh PM to TID both eyes    Start pred forte three times a day left eye only   Start atropine 1 x daily left eye only    3. Tino nodule, right eye    Notable punctate epitheliopathy, and irregular surface, no epi  defect   Significant tear film insufficiency both eyes   Punctal plugs placed in past; right eye 0.2 mm collagen plug placed today; left eye stenotic, unable/replacement not needed   Topical regimen as above        4. 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  I personally obtained the chief complaint(s) and history of present illness.  I confirmed and edited as necessary the review of systems, past medical/surgical history, family history, social history, and examination findings as documented by others; and I examined the patient myself.  I personally reviewed the relevant tests, images, and reports as documented above.  I personally reviewed the ophthalmic test(s) associated with this encounter, agree with the interpretation(s) as documented by the resident/fellow, and have edited the corresponding report(s) as necessary.   I formulated and edited as necessary the assessment and plan and discussed the findings and management plan with the patient and family and I was present for the entire procedure performed by the resident/fellow.    Nasrin Ang MD   of Ophthalmology.  Retina Service   Department of Ophthalmology and Visual Neurosciences   [facility]  Phone: [phone]   Fax: [phone]

## 2020-06-18 NOTE — PATIENT INSTRUCTIONS
1. Erythromycin ointment 4 times daily, right eye.     2. Artificial tear ointment, every 2 hours, left eye.       Keep all eye drops and ointments 5 minutes apart.

## 2020-06-18 NOTE — NURSING NOTE
Chief Complaints and History of Present Illnesses   Patient presents with     Eye Burning Left Eye     Chief Complaint(s) and History of Present Illness(es)     Eye Burning Left Eye     Laterality: left eye              Comments     Pt here with  over the phone. Pt also with his son today.  Burning eye pain in LE for the past week. Pt feeling burning and irritation nasally.  Pt tried Refresh PM with little to no relief.  No changes in vision RE. No eye pain in RE.    SHAUN Silva June 18, 2020 7:59 AM

## 2020-08-10 ENCOUNTER — DOCUMENTATION ONLY (OUTPATIENT)
Dept: CARE COORDINATION | Facility: CLINIC | Age: 70
End: 2020-08-10

## 2020-08-13 ENCOUNTER — OFFICE VISIT (OUTPATIENT)
Dept: FAMILY MEDICINE | Facility: CLINIC | Age: 70
End: 2020-08-13
Payer: COMMERCIAL

## 2020-08-13 ENCOUNTER — ANCILLARY PROCEDURE (OUTPATIENT)
Dept: GENERAL RADIOLOGY | Facility: CLINIC | Age: 70
End: 2020-08-13
Attending: NURSE PRACTITIONER
Payer: COMMERCIAL

## 2020-08-13 VITALS
OXYGEN SATURATION: 94 % | TEMPERATURE: 99.4 F | DIASTOLIC BLOOD PRESSURE: 68 MMHG | HEART RATE: 58 BPM | SYSTOLIC BLOOD PRESSURE: 150 MMHG

## 2020-08-13 DIAGNOSIS — M79.622 PAIN OF LEFT UPPER ARM: ICD-10-CM

## 2020-08-13 DIAGNOSIS — M25.512 LEFT SUBSCAPULAR PAIN: ICD-10-CM

## 2020-08-13 DIAGNOSIS — M79.622 PAIN OF LEFT UPPER ARM: Primary | ICD-10-CM

## 2020-08-13 ASSESSMENT — PAIN SCALES - GENERAL: PAINLEVEL: NO PAIN (0)

## 2020-08-13 NOTE — NURSING NOTE
Chief Complaint   Patient presents with     Physical     Pt is here for a physical.     Pain     Pt has pain on L side of body.     Radha Mcclure, CAM 4:01 PM  8/13/2020

## 2020-08-13 NOTE — PATIENT INSTRUCTIONS
First, have an x ray of left shoulder. Next, start Physical Therapy for left shoulder and upper arm pain. Next, see Sarika in 4 weeks for follow up on left shoulder and upper arm pain and an annual wellness check up. Thank you.   [facility] Medication Refill Request Information:  * Please contact your pharmacy regarding ANY request for medication refills.  ** [facility] Clinic Prescription Fax = [phone]  * Please allow 3 business days for routine medication refills.  * Please allow 5 business days for controlled substance medication refills.     [facility] Test Result notification information:  *You will be notified with in 7-10 days of your appointment day regarding the results of your test.  If you are on MyChart you will be notified as soon as the provider has reviewed the results and signed off on them.    [facility]: [phone]

## 2020-08-13 NOTE — PROGRESS NOTES
HPI       Shayne Fiore is a 70 year old man who presents with concerns of left sided shoulder and subscapular pain. This started a few months ago. He did not have an injury. He has not noticed decreased strength of his left arm or hand. He is not dropping things. His movement is affected and he has to sleep in certain positions due to the pain. He wants to find out what is wrong with his left shoulder and what he can do to manage his pain and discomfort.   Abdifatah does not speak English, [place] is his primary language. His son signed a waiver to be the .   Chief Complaint   Patient presents with     Physical     Pt is here for a physical.     Pain     Pt has pain on L side of body.   Problem, Medication and Allergy Lists were reviewed and updated if needed..    Patient is a new patient to this clinic and so  I reviewed/updated the Past Medical History, the Family History and the Social History .         Review of Systems:   Review of Systems     Constitutional:  Negative for fever, chills and fatigue.   Musculoskeletal:  Positive for arthralgias.               Physical Exam:     Vitals:    08/13/20 1553   BP: (!) 150/68   Pulse: 58   Temp: 99.4  F (37.4  C)   TempSrc: Oral   SpO2: 94%     There is no height or weight on file to calculate BMI.  Vitals were reviewed and were normal.     Physical Exam  Vitals signs reviewed.   Constitutional:       Appearance: Normal appearance.   HENT:      Head: Normocephalic.   Musculoskeletal:      Left shoulder: He exhibits decreased range of motion and pain. He exhibits no bony tenderness and no swelling.        Arms:    Skin:     General: Skin is warm and dry.   Neurological:      General: No focal deficit present.      Mental Status: He is alert and oriented to person, place, and time.   Psychiatric:         Mood and Affect: Mood normal.         Behavior: Behavior normal.         Results:   X ray pending.    Assessment and Plan     1. Pain of left upper  arm    - PHYSICAL THERAPY REFERRAL; Future  - XR Shoulder Left 2 Views; Future    2. Left subscapular pain    - PHYSICAL THERAPY REFERRAL; Future  - XR Shoulder Left 2 Views; Future      Patient instructions:First, have an x ray of your left shoulder. Next, start Physical Therapy for left shoulder and upper arm pain. Next, see Sarika in 4 weeks for follow up on left shoulder and upper arm pain and an annual wellness check up. Thank you. (Abdifatah's son whom is interpreting for him today is able to read English and will review this information with his father and ensure that he attends PT as ordered.)  Options for treatment and follow-up care were reviewed with the patient. Shayne Fiore  engaged in the decision making process and verbalized understanding of the options discussed and agreed with the final plan.  Sarika Downs, YG, CNP

## 2020-08-18 ENCOUNTER — THERAPY VISIT (OUTPATIENT)
Dept: PHYSICAL THERAPY | Facility: CLINIC | Age: 70
End: 2020-08-18
Attending: NURSE PRACTITIONER
Payer: COMMERCIAL

## 2020-08-18 DIAGNOSIS — M25.512 LEFT SUBSCAPULAR PAIN: Primary | ICD-10-CM

## 2020-08-18 DIAGNOSIS — M79.622 PAIN OF LEFT UPPER ARM: ICD-10-CM

## 2020-08-18 PROCEDURE — 97162 PT EVAL MOD COMPLEX 30 MIN: CPT | Mod: GP | Performed by: PHYSICAL THERAPIST

## 2020-08-18 PROCEDURE — 97112 NEUROMUSCULAR REEDUCATION: CPT | Mod: GP | Performed by: PHYSICAL THERAPIST

## 2020-08-18 PROCEDURE — 97110 THERAPEUTIC EXERCISES: CPT | Mod: GP | Performed by: PHYSICAL THERAPIST

## 2020-08-18 NOTE — PROGRESS NOTES
[facility] Initial Evaluation -- Upper Extremity    Evaluation Date: August 18, 2020  Shayne Fiore is a 70 year old male with a L upper arm/subscap condition.   Referral: GP  Work mechanical stresses: retired  Employment status:    Leisure mechanical stresses:   Functional disability score (SPADI): See Flowsheet  VAS score (0-10): 4/10, ranges 0-7/10  Handedness (R/L):  R  Patient goals/expectations:  decr arm and shld pain    HISTORY    Present symptoms:  L neck,L interscap, subscap, lat arm and forearm and sometimes L hand .    Pain quality (sharp/shooting/stabbing/aching/burning/cramping):  sharp    Present since (onset date):  June 2020.  MD referral 8-  Symptoms (improving/unchanging/worsening):  unchanging.    Symptoms commenced as a result of: unknown   Condition occurred in the following environment: unknown    Symptoms at onset:   Paresthesia (yes/no):  no  Spinal history: yes   Cough/Sneeze (pos/neg):  pos    Constant symptoms:   Intermittent symptoms: neck and arm    Symptoms are worse with the following: Always reaching overhead, out to side, and behind back, Always Sleeping (prone/sup/side R/L) - sleeps L sidelying pain 3-7/10  Symptoms are better with the following: Other - exercises given previously at PT    Continued use makes the pain (better/worse/no effect):     Disturbed night (yes/no):  yes    Pain at rest (yes/no): yes - L scap  Site (neck/shoulder/elbow/wrist/hand):     Other questions (swelling/catching/clicking/locking/subluxing):      Previous episodes: 2016 -   Previous treatments: PT - relieved pain    Specific Questions:  General health (excellent/good/fair/poor):  good  Pertinent medical history includes: eyes  Medications (nil/NSAIDS/analg/steroids/anticoag/other):  For eyes - see epic chart  Medical allergies:  Sulfa  Imaging (None/Xray/MRI/Other): See Chart  Recent or major surgery (yes/no): surgery on eyes  Night pain (yes/no): yes because he  sleeps on his L shld every night - decr if he gets up off the shld  Accidents (yes/no): none  Unexplained weight loss (yes/no): no  Barriers at home: occas needs assistance w/ self cares  Other red flags: none    Sites for physical examination (neck/shoulder/elbow/wrist/hand): neck/L shld    EXAMINATION    Posture:  Sitting (good/fair/poor): poor  Correction of posture (better/worse/no effect/NA): NE - unable to get good correction d/t language   Standing (good/fair/poor): poor - Protracted neck and shlds, rounded back  Other observations:  Pt has very poor eyesight - needs verbal and tactile cues    Neurological (NA/motor/sensory/reflexes/dural):     Baselines (pain or functional activity): Pain reaching overhead, out to the side, and behind back    Extremities (Shoulder/Elbow/Wrist/Hand): L shld    Movement Loss Herman Mod Min Nil Pain   Flexion  130   Symmetrical to R ++ arm   Extension        Abduction  130   Symmetrical to R ++ arm   Internal Rotation  Reach behind back  To L3   Symmetrical to R +++ arm pain and tingling   External Rotation  Behind head  To UT   Symmetrical to R  ++ arm      Passive Movement (+/- overpressure)/(PDM/ERP):  NA d/t time   Resisted Test Response (pain): NA d/t time and pain  Other Tests:     Spine:  Movement Loss:    Cerv AROM: Flex  Mod decr     Retr  Mod decr - pain neck and scap     Ext  Mod decr - pain neck and scap     Rot R  Mod decr Pain neck and scap            L  Mod decr Pain neck and scap     SB R  Mod decr Pain neck and scap           L  Mod decr Pain neck and scap   Did have arm pain w/ some movements (pt did not identify during testing - reported after) and after completing ROM  Effect of repeated movements:    Cerv Retr (back against wall) x5 - Incr neck and L arm, W, NE ROM   Seated SB L x10 - decr arm symptoms, B, NE ROM  Effect of static positioning: NA  Spine testing (not relevant/relevant/secondary problem): relevant    Baseline Symptoms: NA - will reassess after  clearing Cerv Spine  Repeated Tests Symptom Response Mechanical Response   Active/Passive movement, resisted test, functional test During - Produce, Abolish, Increase, Decrease, NE After -   Better, Worse, NB, NW, NE Effect -   ? or ? ROM, strength or key functional test No Effect          Effect of static positioning                  Provisional Classification (Extremity/Spine): Spine - Derangement - Asymmetrical, unilateral, symptoms below elbow, with relevant lateral component      Principle of Management:  Education:  Posture - neutral spine, use of Lumbar support, limit sitting on couch, sit in dining room chair more, spine as cause of arm symptoms, importance of multiple sets of exer/day and HEP  Equipment provided:  None - recommened using towel in diningroom chair and pillows on couch  Exercise and dosage:  Seated Cerv SB x10 4 x/day, Seated scap retr x10 4 x/day    ASSESSMENT/PLAN:    Patient is a 70 year old male with cervical, left side shoulder and L arm complaints.    Patient has the following significant findings with corresponding treatment plan.                Diagnosis 1:  L scap pain/L Upper arm pain  Pain -  manual therapy, self management, education, directional preference exercise and home program  Decreased ROM/flexibility - manual therapy, therapeutic exercise and home program  Decreased strength - therapeutic exercise, therapeutic activities and home program  Decreased function - therapeutic activities and home program  Impaired posture - neuro re-education and home program    Therapy Evaluation Codes:   1) History comprised of:   Personal factors that impact the plan of care:      Language.    Comorbidity factors that impact the plan of care are:      eyes - very poor eyesight.     Medications impacting care: eye medication.  2) Examination of Body Systems comprised of:   Body structures and functions that impact the plan of care:      Cervical spine and Shoulder.   Activity limitations that  impact the plan of care are:      Dressing, Sleeping and reaching overhead, behind back, and out to the side..  3) Clinical presentation characteristics are:   Evolving/Changing.  4) Decision-Making    Moderate complexity using standardized patient assessment instrument and/or measureable assessment of functional outcome.  Cumulative Therapy Evaluation is: Moderate complexity.    Previous and current functional limitations:  (See Goal Flow Sheet for this information)    Short term and Long term goals: (See Goal Flow Sheet for this information)     Communication ability:  Pt's son interpreted for eval and treatment  Treatment Explanation - The following has been discussed with the patient:   RX ordered/plan of care  Anticipated outcomes  Possible risks and side effects  This patient would benefit from PT intervention to resume normal activities.    Rehab potential is good.    Frequency:  1 X week, once daily  Duration:  for 8 weeks  Discharge Plan:  Achieve all LTG.  Independent in home treatment program.  Reach maximal therapeutic benefit.    Please refer to the daily flowsheet for treatment today, total treatment time and time spent performing 1:1 timed codes.

## 2020-08-18 NOTE — LETTER
[facility] ATHLETIC [facility] PHYSICAL THER  [address]   [place] [address]  [phone]    2020    Re: Shayne Fiore   :   1950  MRN:  [id number]   REFERRING PHYSICIAN:   Sarika Downs    [facility] ATHLETIC [facility] [place] PHYSICAL THER  Date of Initial Evaluation:  20  Visits:  Rxs Used: 1  Reason for Referral:     Pain of left upper arm  Left subscapular pain    EVALUATION SUMMARY  [facility] Athletic [facility] Initial Evaluation -- Upper Extremity  Evaluation Date: 2020  Shayne Fiore is a 70 year old male with a L upper arm/subscap condition.   Referral: GP  Work mechanical stresses: retired  Employment status:    Leisure mechanical stresses:   Functional disability score (SPADI): See Flowsheet  VAS score (0-10): 4/10, ranges 0-7/10  Handedness (R/L):  R  Patient goals/expectations:  decr arm and shld pain    HISTORY  Present symptoms:  L neck,L interscap, subscap, lat arm and forearm and sometimes L hand .    Pain quality (sharp/shooting/stabbing/aching/burning/cramping):  sharp  Present since (onset date):  2020.  MD referral 2020  Symptoms (improving/unchanging/worsening):  unchanging.  Symptoms commenced as a result of: unknown   Condition occurred in the following environment: unknown  Symptoms at onset:   Paresthesia (yes/no):  no  Spinal history: yes   Cough/Sneeze (pos/neg):  pos  Constant symptoms:   Intermittent symptoms: neck and arm  Symptoms are worse with the following: Always reaching overhead, out to side, and behind back, Always Sleeping (prone/sup/side R/L) - sleeps L sidelying pain 3-7/10  Symptoms are better with the following: Other - exercises given previously at PT  Continued use makes the pain (better/worse/no effect):   Disturbed night (yes/no):  yes  Pain at rest (yes/no): yes - L scap  Site (neck/shoulder/elbow/wrist/hand):   Other questions (swelling/catching/clicking/locking/subluxing):     Previous episodes: 2016 -   Previous treatments: PT - relieved pain    Re: Shayne Fiore   :   1950    Specific Questions:  General health (excellent/good/fair/poor):  good  Pertinent medical history includes: eyes  Medications (nil/NSAIDS/analg/steroids/anticoag/other):  For eyes - see epic chart  Medical allergies:  Sulfa  Imaging (None/Xray/MRI/Other): See Chart  Recent or major surgery (yes/no): surgery on eyes  Night pain (yes/no): yes because he sleeps on his L shld every night - decr if he gets up off the shld  Accidents (yes/no): none  Unexplained weight loss (yes/no): no  Barriers at home: occas needs assistance w/ self cares  Other red flags: none  Sites for physical examination (neck/shoulder/elbow/wrist/hand): neck/L shld    EXAMINATION  Posture:  Sitting (good/fair/poor): poor  Correction of posture (better/worse/no effect/NA): NE - unable to get good correction d/t language   Standing (good/fair/poor): poor - Protracted neck and shlds, rounded back  Other observations:  Pt has very poor eyesight - needs verbal and tactile cues  Neurological (NA/motor/sensory/reflexes/dural):   Baselines (pain or functional activity): Pain reaching overhead, out to the side, and behind back  Extremities (Shoulder/Elbow/Wrist/Hand): L shld  Movement Loss Herman Mod Min Nil Pain   Flexion  130   Symmetrical to R ++ arm   Extension        Abduction  130   Symmetrical to R ++ arm   Internal Rotation  Reach behind back  To L3   Symmetrical to R +++ arm pain and tingling   External Rotation  Behind head  To UT   Symmetrical to R  ++ arm     Passive Movement (+/- overpressure)/(PDM/ERP):  NA d/t time   Resisted Test Response (pain): NA d/t time and pain  Other Tests:   Spine:  Movement Loss:    Cerv AROM: Flex  Mod decr     Retr  Mod decr - pain neck and scap     Ext  Mod decr - pain neck and scap     Rot R  Mod decr Pain neck and scap  Re: Shayne Fiore   :   1950              L  Mod decr Pain neck  and scap     SB R  Mod decr Pain neck and scap           L  Mod decr Pain neck and scap   Did have arm pain w/ some movements (pt did not identify during testing - reported after) and after completing ROM  Effect of repeated movements:    Cerv Retr (back against wall) x5 - Incr neck and L arm, W, NE ROM   Seated SB L x10 - decr arm symptoms, B, NE ROM  Effect of static positioning: NA  Spine testing (not relevant/relevant/secondary problem): relevant    Baseline Symptoms: NA - will reassess after clearing Cerv Spine  Repeated Tests Symptom Response Mechanical Response   Active/Passive movement, resisted test, functional test During - Produce, Abolish, Increase, Decrease, NE After -   Better, Worse, NB, NW, NE Effect -   ? or ? ROM, strength or key functional test No Effect          Effect of static positioning                Provisional Classification (Extremity/Spine): Spine - Derangement - Asymmetrical, unilateral, symptoms below elbow, with relevant lateral component    Principle of Management:  Education:  Posture - neutral spine, use of Lumbar support, limit sitting on couch, sit in dining room chair more, spine as cause of arm symptoms, importance of multiple sets of exer/day and HEP  Equipment provided:  None - recommened using towel in diningroom chair and pillows on couch  Exercise and dosage:  Seated Cerv SB x10 4 x/day, Seated scap retr x10 4 x/day    ASSESSMENT/PLAN:  Patient is a 70 year old male with cervical, left side shoulder and L arm complaints.    Patient has the following significant findings with corresponding treatment plan.                Diagnosis 1:  L scap pain/L Upper arm pain  Pain -  manual therapy, self management, education, directional preference exercise and home program  Decreased ROM/flexibility - manual therapy, therapeutic exercise and home program  Decreased strength - therapeutic exercise, therapeutic activities and home program  Decreased function - therapeutic activities and  home program  Impaired posture - neuro re-education and home program    Therapy Evaluation Codes:   1) History comprised of:   Personal factors that impact the plan of care:    Re: Shayne Fiore   :   1950      Language.    Comorbidity factors that impact the plan of care are:      eyes - very poor eyesight.     Medications impacting care: eye medication.  2) Examination of Body Systems comprised of:   Body structures and functions that impact the plan of care:      Cervical spine and Shoulder.   Activity limitations that impact the plan of care are:      Dressing, Sleeping and reaching overhead, behind back, and out to the side..  3) Clinical presentation characteristics are:   Evolving/Changing.  4) Decision-Making    Moderate complexity using standardized patient assessment instrument and/or measureable assessment of functional outcome.  Cumulative Therapy Evaluation is: Moderate complexity.    Previous and current functional limitations:  (See Goal Flow Sheet for this information)    Short term and Long term goals: (See Goal Flow Sheet for this information)     Communication ability:  Pt's son interpreted for eval and treatment  Treatment Explanation - The following has been discussed with the patient:   RX ordered/plan of care  Anticipated outcomes  Possible risks and side effects  This patient would benefit from PT intervention to resume normal activities.    Rehab potential is good.    Frequency:  1 X week, once daily  Duration:  for 8 weeks  Discharge Plan:  Achieve all LTG.  Independent in home treatment program.  Reach maximal therapeutic benefit.    Please refer to the daily flowsheet for treatment today, total treatment time and time spent performing 1:1 timed codes.     Thank you for your referral.    INQUIRIES  Therapist: Rosalio Gonzalez, PT, Cert. MDT  [facility] [place] [facility]  [address]  [place] [address]  Phone: [phone]  Fax:  [phone]

## 2020-08-23 ASSESSMENT — ENCOUNTER SYMPTOMS
FATIGUE: 0
ARTHRALGIAS: 1
FEVER: 0
CHILLS: 0

## 2020-08-27 ENCOUNTER — THERAPY VISIT (OUTPATIENT)
Dept: PHYSICAL THERAPY | Facility: CLINIC | Age: 70
End: 2020-08-27
Payer: COMMERCIAL

## 2020-08-27 DIAGNOSIS — M79.622 PAIN OF LEFT UPPER ARM: ICD-10-CM

## 2020-08-27 DIAGNOSIS — M25.512 LEFT SUBSCAPULAR PAIN: ICD-10-CM

## 2020-08-27 PROCEDURE — 97112 NEUROMUSCULAR REEDUCATION: CPT | Mod: GP | Performed by: PHYSICAL THERAPY ASSISTANT

## 2020-08-27 PROCEDURE — 97110 THERAPEUTIC EXERCISES: CPT | Mod: GP | Performed by: PHYSICAL THERAPY ASSISTANT

## 2020-11-18 ENCOUNTER — TELEPHONE (OUTPATIENT)
Dept: FAMILY MEDICINE | Facility: CLINIC | Age: 70
End: 2020-11-18

## 2020-11-18 ENCOUNTER — VIRTUAL VISIT (OUTPATIENT)
Dept: FAMILY MEDICINE | Facility: CLINIC | Age: 70
End: 2020-11-18
Payer: COMMERCIAL

## 2020-11-18 DIAGNOSIS — R50.9 ELEVATED TEMPERATURE: Primary | ICD-10-CM

## 2020-11-18 DIAGNOSIS — R07.89 RIGHT-SIDED CHEST WALL PAIN: ICD-10-CM

## 2020-11-18 DIAGNOSIS — R05.9 COUGH: ICD-10-CM

## 2020-11-18 PROCEDURE — 99213 OFFICE O/P EST LOW 20 MIN: CPT | Mod: TEL | Performed by: NURSE PRACTITIONER

## 2020-11-18 ASSESSMENT — PAIN SCALES - GENERAL: PAINLEVEL: MODERATE PAIN (4)

## 2020-11-18 NOTE — TELEPHONE ENCOUNTER
Spoke to Abdmarkhalif son (Nelson). Pt is having SOB, fever and pain on R side of lung due to too much coughing. Pt was recommend to go to the ER to get proper medical attention for those symptoms. Pt declined ER. Son states pt has been having these symptoms for almost 14 days. Pt was scheduled today at 1:10pm for a telephone visit with Sarika Downs to discuss concerns. Pt was advised if symptoms get worse please go to ER.     CAM Lambert 10:22 AM  11/18/2020

## 2020-11-18 NOTE — PROGRESS NOTES
"Shayne Fiore is a 70 year old male who is being evaluated via a billable telephone visit.      The patient has been notified of following:     \"This telephone visit will be conducted via a call between you and your physician/provider. We have found that certain health care needs can be provided without the need for a physical exam.  This service lets us provide the care you need with a short phone conversation.  If a prescription is necessary we can send it directly to your pharmacy.  If lab work is needed we can place an order for that and you can then stop by our lab to have the test done at a later time.    Telephone visits are billed at different rates depending on your insurance coverage. During this emergency period, for some insurers they may be billed the same as an in-person visit.  Please reach out to your insurance provider with any questions.    If during the course of the call the physician/provider feels a telephone visit is not appropriate, you will not be charged for this service.\"    Patient has given verbal consent for Telephone visit?  Yes    What phone number would you like to be contacted at? [phone]     How would you like to obtain your AVS? Mail a copy    Subjective   *A [employer] Interactive  is used for today's call.   Shayne Fiore is a 70 year old male who presents via phone visit today for the following health issues:    HPI    Elevated temp/cough/right sided chest wall pain: Symptoms started 16 days prior after his granddaughter was sick: He is able to drink fluids, he is eating poorly, no appetite. No headaches. \"Day and night, I am laying down, but not really sleeping.\" He is coughing, this is productive of thick white phlegm. He has some chest pain under his right breast bone. He has been taking some Tylenol which is helpful, it usually brings the fever down. He had the shaking chills last week, this has resovled. Overall, he does seem to be improving, however his family " is wondering what they can give him for symptom management.     Review of Systems   Constitutional, HEENT, cardiovascular, pulmonary, gi and gu systems are negative, except as otherwise noted.    Objective   Vitals - Patient Reported  Pain Score: Moderate Pain (4)    Ill, coughing, fatigue   PSYCH: Alert and oriented times 3; coherent speech, normal   rate and volume, able to articulate logical thoughts, able   to abstract reason, no tangential thoughts, no hallucinations   or delusions  His affect is normal  RESP: Coughing, no audible wheezing, able to talk in full sentences  Remainder of exam unable to be completed due to telephone visits    Diagnostics:   Covid test ordered    Assessment & Plan     Elevated temperature    - Symptomatic COVID-19 Virus (Coronavirus) by PCR    Cough    - Symptomatic COVID-19 Virus (Coronavirus) by PCR    Right-sided chest wall pain    Return if symptoms worsen or fail to improve.    Sarika Downs NP  [facility] [place]    Phone call duration:10 minutes  First, I did recommend that Shayne be seen in the ER. He refused this and wanted to have a virtual visit. It does sound like, overall, his symptoms are improving. His son explained that he just wanted to be tested and then find out what he can do for symptom management. I ordered the Covid test, his son will attempt to schedule this at a drive by location. I will notify Shayne of the results with the help of an . They will continue Tylenol on a more regular basis such as every 4 hours and start Delsym cough syrup.  I encouraged his family to call back with worsening or persisting symptoms. His son verbalized understanding of the plan.   Sarika Downs, YG, CNP

## 2020-11-18 NOTE — TELEPHONE ENCOUNTER
Health Call Center    Phone Message    May a detailed message be left on voicemail: yes     Reason for Call: Other: Call Back: Patient's son reports that the patient is having symptoms of shortness of breath, coughing, fever and painful right side of lung. Patient's son was advise that that the symptoms he is reporting are all symptoms of COVID. [email or web address] information was given. Patient's son declined going to the Emergency room for the shortness of breath and pain on right side of lung. Patient's son would like a call back from the clinic.      Action Taken: Message routed to:  [facility] NP [facility]INIC    Travel Screening: Not Applicable

## 2020-11-18 NOTE — PATIENT INSTRUCTIONS

## 2020-11-23 DIAGNOSIS — R05.9 COUGH: ICD-10-CM

## 2020-11-23 DIAGNOSIS — R50.9 ELEVATED TEMPERATURE: ICD-10-CM

## 2020-11-23 PROCEDURE — U0003 INFECTIOUS AGENT DETECTION BY NUCLEIC ACID (DNA OR RNA); SEVERE ACUTE RESPIRATORY SYNDROME CORONAVIRUS 2 (SARS-COV-2) (CORONAVIRUS DISEASE [COVID-19]), AMPLIFIED PROBE TECHNIQUE, MAKING USE OF HIGH THROUGHPUT TECHNOLOGIES AS DESCRIBED BY CMS-2020-01-R: HCPCS | Performed by: NURSE PRACTITIONER

## 2020-11-24 LAB
SARS-COV-2 RNA SPEC QL NAA+PROBE: NOT DETECTED
SPECIMEN SOURCE: NORMAL

## 2020-11-30 ENCOUNTER — TELEPHONE (OUTPATIENT)
Dept: FAMILY MEDICINE | Facility: CLINIC | Age: 70
End: 2020-11-30

## 2020-11-30 NOTE — TELEPHONE ENCOUNTER
LVM to schedule a virtual appointment to discuss symptoms.     CAM Lambert 10:46 AM  11/30/2020

## 2020-12-01 ENCOUNTER — TELEPHONE (OUTPATIENT)
Dept: FAMILY MEDICINE | Facility: CLINIC | Age: 70
End: 2020-12-01

## 2020-12-01 NOTE — TELEPHONE ENCOUNTER
LVM to check on symptoms and offer an appointment with Sarika Downs.     ACM Lambert 9:09 AM  12/1/2020

## 2020-12-08 ENCOUNTER — TELEPHONE (OUTPATIENT)
Dept: FAMILY MEDICINE | Facility: CLINIC | Age: 70
End: 2020-12-08

## 2020-12-08 NOTE — TELEPHONE ENCOUNTER
x2 Attempted to call pt but no answer. A Voicemail was left with  to call clinic back for virtual appointment.     CAM Lambert 2:36 PM  12/8/2020

## 2020-12-10 ENCOUNTER — VIRTUAL VISIT (OUTPATIENT)
Dept: INTERNAL MEDICINE | Facility: CLINIC | Age: 70
End: 2020-12-10
Payer: COMMERCIAL

## 2020-12-10 DIAGNOSIS — A15.0 PULMONARY TUBERCULOSIS: Primary | ICD-10-CM

## 2020-12-10 PROCEDURE — 99203 OFFICE O/P NEW LOW 30 MIN: CPT | Mod: GT | Performed by: PEDIATRICS

## 2020-12-10 NOTE — NURSING NOTE
Chief Complaint   Patient presents with     Establish Care     pt would like to establish care per son, ongoing care for TB diagnosed yesterday in ED       Chun Delatorre CMA, EMT at 2:23 PM on 12/10/2020.

## 2020-12-10 NOTE — PROGRESS NOTES
"Shayne Fiore is a 70 year old male who is being evaluated via a billable video visit.      The patient has been notified of following:     \"This video visit will be conducted via a call between you and your physician/provider. We have found that certain health care needs can be provided without the need for an in-person physical exam.  This service lets us provide the care you need with a video conversation.  If a prescription is necessary we can send it directly to your pharmacy.  If lab work is needed we can place an order for that and you can then stop by our lab to have the test done at a later time.    Video visits are billed at different rates depending on your insurance coverage.  Please reach out to your insurance provider with any questions.    If during the course of the call the physician/provider feels a video visit is not appropriate, you will not be charged for this service.\"    Patient has given verbal consent for Video visit? Yes  How would you like to obtain your AVS? MyChart  If you are dropped from the video visit, the video invite should be resent to: Send to e-mail at: [email or web address]  Will anyone else be joining your video visit? No    About this note. I use the medical record to document (to the best of my ability) my understanding of:   - What the patient told me,  - Relevant details from my exam, records review, and/or test results, and  - My assessment and plan  Patients with questions are welcome to contact me; I will reply as soon as time allows.    Format: notes about discussion in virtual visit  Status: established patient, PCP Juani Choudhary  Visit type: evaluation & management of one or more problems      Virtual Visit Details    Type of service:  Video Visit    Start Time: 2:41 PM    End Time:3:06 PM    Originating Location (pt. Location): Home    Distant Location (provider location):  [facility] PRIMARY CARE [facility]     Platform used for " Video Visit: [facility]    Shayne Fiore is a 70 year old man, with concerns including:  Chief Complaint   Patient presents with     Establish Care     pt would like to establish care per son, ongoing care for TB diagnosed yesterday in ED     Patient was seen video with help of audio  in [facility].    There were no vitals taken for this visit.    History, update, and/or problems      Went to emergency at [facility]. He had blood test and CT scan, and said he has tuberculosis. He was treated for this 15 years ago.    Several tests were done, and appear to be pending.  He was supposed to be admitted to the hospital, but he and his family declined.  According to the ER note, and infectious disease Dr. Avila was going to contact the [place] [facility] and arrange for treatment and follow-up.    5 people: in a townhome with Mom and 2 siblings     It appears that this is already happening, by ID attending Dr. Avila in the "[employer] system, cultures, etc. Just in case, I called the [facility], was connected with the 's answering machine, and left a detailed message with my callback number.     Time note (e4, 25'): The total time for this visit was 25 minutes, all of which consisted of counseling and/or coordination of care.      ADDENDUM: received VM from MD, asking me to call IMANI Vásquez [phone]. I did and left callback information on her VM.

## 2020-12-27 ENCOUNTER — HEALTH MAINTENANCE LETTER (OUTPATIENT)
Age: 70
End: 2020-12-27

## 2021-04-06 ENCOUNTER — OFFICE VISIT (OUTPATIENT)
Dept: OPHTHALMOLOGY | Facility: CLINIC | Age: 71
End: 2021-04-06
Attending: OPHTHALMOLOGY
Payer: COMMERCIAL

## 2021-04-06 DIAGNOSIS — H11.022: ICD-10-CM

## 2021-04-06 DIAGNOSIS — H25.12 SENILE NUCLEAR SCLEROSIS, LEFT: ICD-10-CM

## 2021-04-06 DIAGNOSIS — H40.1133 PRIMARY OPEN ANGLE GLAUCOMA (POAG) OF BOTH EYES, SEVERE STAGE: Primary | ICD-10-CM

## 2021-04-06 DIAGNOSIS — Z96.1 PSEUDOPHAKIA OF RIGHT EYE: ICD-10-CM

## 2021-04-06 PROCEDURE — 92015 DETERMINE REFRACTIVE STATE: CPT

## 2021-04-06 PROCEDURE — 99214 OFFICE O/P EST MOD 30 MIN: CPT | Mod: GC | Performed by: OPHTHALMOLOGY

## 2021-04-06 PROCEDURE — G0463 HOSPITAL OUTPT CLINIC VISIT: HCPCS

## 2021-04-06 RX ORDER — LATANOPROST 50 UG/ML
1 SOLUTION/ DROPS OPHTHALMIC AT BEDTIME
Qty: 7.5 ML | Refills: 3 | Status: SHIPPED | OUTPATIENT
Start: 2021-04-06 | End: 2021-06-08

## 2021-04-06 RX ORDER — DIPHENHYDRAMINE HYDROCHLORIDE 25 MG/1
25 CAPSULE ORAL DAILY
COMMUNITY
Start: 2021-03-16

## 2021-04-06 RX ORDER — ISONIAZID 300 MG/1
300 TABLET ORAL DAILY
COMMUNITY
Start: 2021-03-16

## 2021-04-06 RX ORDER — PYRAZINAMIDE TABLET 500 MG/1
3 TABLET ORAL DAILY
COMMUNITY
Start: 2021-03-16

## 2021-04-06 RX ORDER — RIFAMPIN 300 MG/1
CAPSULE ORAL DAILY
COMMUNITY
Start: 2021-03-16

## 2021-04-06 RX ORDER — ETHAMBUTOL HYDROCHLORIDE 400 MG/1
3 TABLET, FILM COATED ORAL DAILY
COMMUNITY
Start: 2021-03-16

## 2021-04-06 ASSESSMENT — TONOMETRY
OD_IOP_MMHG: 18
IOP_METHOD: TONOPEN
OS_IOP_MMHG: 41

## 2021-04-06 ASSESSMENT — CONF VISUAL FIELD
OS_INFERIOR_TEMPORAL_RESTRICTION: 1
OS_SUPERIOR_TEMPORAL_RESTRICTION: 1
OS_INFERIOR_NASAL_RESTRICTION: 1
OS_SUPERIOR_NASAL_RESTRICTION: 1

## 2021-04-06 ASSESSMENT — VISUAL ACUITY
OD_CC: 20/150
OS_SC: NLP
METHOD: SNELLEN - LINEAR
CORRECTION_TYPE: GLASSES

## 2021-04-06 ASSESSMENT — REFRACTION_MANIFEST
OD_ADD: +3.00
OD_CYLINDER: +5.00
OS_SPHERE: BALANCE
OD_AXIS: 095
OD_SPHERE: -4.00
OS_ADD: +3.00

## 2021-04-06 ASSESSMENT — EXTERNAL EXAM - RIGHT EYE: OD_EXAM: NORMAL

## 2021-04-06 ASSESSMENT — REFRACTION_WEARINGRX
OD_SPHERE: -6.50
OD_CYLINDER: +8.00
OS_SPHERE: BALANCE
OD_ADD: +3.00
SPECS_TYPE: BIFOCAL
OD_AXIS: 100

## 2021-04-06 ASSESSMENT — SLIT LAMP EXAM - LIDS
COMMENTS: NORMAL
COMMENTS: NORMAL

## 2021-04-06 ASSESSMENT — EXTERNAL EXAM - LEFT EYE: OS_EXAM: NORMAL

## 2021-04-06 ASSESSMENT — CUP TO DISC RATIO: OD_RATIO: 0.9

## 2021-04-06 NOTE — Clinical Note
Can we try to figure out where this patient was referred from?  I can't find anyone with the name in the appointment notes     Thanks

## 2021-04-06 NOTE — NURSING NOTE
The patient son notes that he is on TB medication.  Chief Complaints and History of Present Illnesses   Patient presents with     New Patient     Chief Complaint(s) and History of Present Illness(es)     New Patient     Laterality: both eyes    Quality: blurred vision    Associated symptoms: eye pain, dryness, itching and burning    Treatments tried: artificial tears    Pain scale: 5/10              Comments     The patient presents with decreased right eye vision and left eye increased pain.  The patient's son is helping to interpret and confirm the patient's symptoms.  He notes pain in the left temple.  He notes left eye increased pain.  He states vision has decreased in the right eye.  He is using artificial tears as needed currently.  Nona Troncoso, COA, COA 1:09 PM 04/06/2021

## 2021-04-07 ENCOUNTER — TELEPHONE (OUTPATIENT)
Dept: OPHTHALMOLOGY | Facility: CLINIC | Age: 71
End: 2021-04-07

## 2021-04-07 NOTE — TELEPHONE ENCOUNTER
[facility] [phone]      [phone]-- updated number     Spoke to TB nurse at [phone]    Notes sent for continuity of care and review with TB MD's    Fax [phone]    Nursing has direct number after review of notes/visit for any further questions    Nando Blue RN 12:50 PM 04/07/21          [facility]    Phone Message    May a detailed message be left on voicemail: yes     Reason for Call: Other: Kathy, nurse with TB clinic for [facility], referring Dr. Johnston would like to discuss Pt plan of care w/Dr. Villalobos or a member of his care team. Please call back ASAP. Thank you. (no answer on backline).     Action Taken: Message routed to:  [facility] P EYE    Travel Screening: Not Applicable

## 2021-04-14 ENCOUNTER — TELEPHONE (OUTPATIENT)
Dept: FAMILY MEDICINE | Facility: CLINIC | Age: 71
End: 2021-04-14

## 2021-04-14 NOTE — TELEPHONE ENCOUNTER
LVM with interp. To schedule a virtual appointment with Sarika Downs to complete PCA form.     CAM Lambert 4:28 PM  4/14/2021

## 2021-05-20 ENCOUNTER — VIRTUAL VISIT (OUTPATIENT)
Dept: INTERNAL MEDICINE | Facility: CLINIC | Age: 71
End: 2021-05-20
Payer: COMMERCIAL

## 2021-05-20 DIAGNOSIS — A15.0 TB (PULMONARY TUBERCULOSIS): Primary | ICD-10-CM

## 2021-05-20 DIAGNOSIS — R91.8 OPACITY OF LUNG ON IMAGING STUDY: ICD-10-CM

## 2021-05-20 PROCEDURE — 99214 OFFICE O/P EST MOD 30 MIN: CPT | Mod: GT | Performed by: PEDIATRICS

## 2021-05-20 NOTE — PROGRESS NOTES
"Shayne is a 71 year old who is being evaluated via a billable video visit.      How would you like to obtain your AVS? Deanhart  If the video visit is dropped, the invitation should be resent by: Send to e-mail at: [email or web address]  Will anyone else be joining your video visit? No        Dear patient. Thank you for visiting with me. I want you to feel respected, understood, and empowered. \"Respect\" is valuing you as much as I would a close family member. \"Empowerment\" happens when you are fully informed, and can make the best possible decision for you.  Please ask me questions!  Challenge anything that is not clear.    Medical records are primarily used as memory aids for me and my colleagues. Things to know about my documentation style:  - The 'problem list' includes current symptoms or diagnoses, and some problems that are resolved but may return. I use the past medical history for problems not expected to return.  - I use single quotation marks for things that you or I said, when I want to clarify who was speaking.  - I use double quotation marks when copying a term from elsewhere in your records. Italics (besides here) may also denote a quotation.  If you have questions or concerns, please contact me; I will reply as soon as time allows.      Virtual Visit Details    Type of service:  Video Visit    Start Time: 3:35 PM    End Time:4:03 PM    Originating Location (pt. Location): Home    Distant Location (provider location):  [facility]     Platform used for Visit: Gutierrez    PCP: Juani Choudhary   Visit type: problem-oriented  Time note (e4, 30'): The total time (on the date of service) for this service was 35 minutes, including discussion/face-to-face, chart review, interpretation not otherwise reported, documentation, and updating of the computerized record.    This visit was conducted with the assistance of an  - video, language, family member " used with patient consent because of unavailability for technical or timing reason      Context    Shayne Fiore is a 71 year old man, here with his son, with concerns including:  Chief Complaint   Patient presents with     Recheck Medication     pt would like to discuss medications, physical (told pt son physical will have to be scheduled for in person)       History, update, and/or problems    Wants to switch to primary care here. They were late at the to-establish visit    During the pandemic he was sick with COVID. He went to [facility] and was found to have TB. He is on treatment, via a TB clinic in [facility].  He gives the number as [phone]    He had a second test, and there was a 'another thing.' Son is not sure about what the test showed, or what the next step. Got oral permission to access records because of virtual visit. CT 4/29 showed a new lesion, and the recommendation was to get scanning again in late July.    They sent in a referral to pulmonary, but it hasn't been scheduled (he held the letter up to the camera). He asked me to put in the referral, so I did.    Foot swelling for 2 weeks - only just the right foot. Uric acid was increased. It sounds like they gave him a medicine for gout.  This is possible from pyrazinamide, which can cause asymptomatic hyperuricemia and diffuse polyarthralgias.        General comments      Physical exam as possible  Physical Exam  Constitutional:       Appearance: Normal appearance.   HENT:      Head: Normocephalic.   Pulmonary:      Effort: No respiratory distress.   Skin:     Comments: No apparent abnormalities in exposed skin   Neurological:      Mental Status: He is alert.   Psychiatric:         Attention and Perception: Attention normal.         Mood and Affect: Mood normal.         Speech: Speech normal.         Behavior: Behavior normal.

## 2021-05-20 NOTE — NURSING NOTE
Chief Complaint   Patient presents with     Recheck Medication     pt would like to discuss medications, physical (told pt son physical will have to be scheduled for in person)       Chun Delatorre CMA, EMT at 2:54 PM on 5/20/2021.

## 2021-05-25 ENCOUNTER — TELEPHONE (OUTPATIENT)
Dept: PULMONOLOGY | Facility: CLINIC | Age: 71
End: 2021-05-25

## 2021-05-25 NOTE — TELEPHONE ENCOUNTER
Called patient to schedule appointment. Unable to reach patient.  left message for patient to call back to schedule appt.

## 2021-05-27 ENCOUNTER — TELEPHONE (OUTPATIENT)
Dept: PULMONOLOGY | Facility: CLINIC | Age: 71
End: 2021-05-27

## 2021-06-07 ENCOUNTER — TRANSFERRED RECORDS (OUTPATIENT)
Dept: HEALTH INFORMATION MANAGEMENT | Facility: CLINIC | Age: 71
End: 2021-06-07

## 2021-06-07 DIAGNOSIS — H40.1133 PRIMARY OPEN ANGLE GLAUCOMA (POAG) OF BOTH EYES, SEVERE STAGE: Primary | ICD-10-CM

## 2021-06-08 ENCOUNTER — OFFICE VISIT (OUTPATIENT)
Dept: OPHTHALMOLOGY | Facility: CLINIC | Age: 71
End: 2021-06-08
Attending: OPHTHALMOLOGY
Payer: COMMERCIAL

## 2021-06-08 DIAGNOSIS — H40.1133 PRIMARY OPEN ANGLE GLAUCOMA (POAG) OF BOTH EYES, SEVERE STAGE: ICD-10-CM

## 2021-06-08 DIAGNOSIS — H25.12 SENILE NUCLEAR SCLEROSIS, LEFT: ICD-10-CM

## 2021-06-08 DIAGNOSIS — Z96.1 PSEUDOPHAKIA OF RIGHT EYE: Primary | ICD-10-CM

## 2021-06-08 PROCEDURE — 92083 EXTENDED VISUAL FIELD XM: CPT | Performed by: OPHTHALMOLOGY

## 2021-06-08 PROCEDURE — G0463 HOSPITAL OUTPT CLINIC VISIT: HCPCS

## 2021-06-08 PROCEDURE — 99214 OFFICE O/P EST MOD 30 MIN: CPT | Performed by: OPHTHALMOLOGY

## 2021-06-08 RX ORDER — LATANOPROST 50 UG/ML
1 SOLUTION/ DROPS OPHTHALMIC AT BEDTIME
Qty: 7.5 ML | Refills: 11 | Status: SHIPPED | OUTPATIENT
Start: 2021-06-08 | End: 2021-10-21

## 2021-06-08 ASSESSMENT — TONOMETRY
IOP_METHOD: APPLANATION
OS_IOP_MMHG: 54
IOP_METHOD: TONOPEN
OD_IOP_MMHG: 20
OD_IOP_MMHG: 16
IOP_METHOD: TONOPEN
OS_IOP_MMHG: 55

## 2021-06-08 ASSESSMENT — REFRACTION_WEARINGRX
OD_SPHERE: -6.50
OD_ADD: +3.00
SPECS_TYPE: BIFOCAL
OD_CYLINDER: +8.00
OD_AXIS: 100
OS_SPHERE: BALANCE

## 2021-06-08 ASSESSMENT — CONF VISUAL FIELD
OS_INFERIOR_TEMPORAL_RESTRICTION: 1
OS_INFERIOR_NASAL_RESTRICTION: 1
OS_SUPERIOR_NASAL_RESTRICTION: 1
OS_SUPERIOR_TEMPORAL_RESTRICTION: 1
METHOD: COUNTING FINGERS

## 2021-06-08 ASSESSMENT — EXTERNAL EXAM - RIGHT EYE: OD_EXAM: NORMAL

## 2021-06-08 ASSESSMENT — CUP TO DISC RATIO: OD_RATIO: 0.9

## 2021-06-08 ASSESSMENT — VISUAL ACUITY
OD_CC: 20/150
OS_CC: NLP
METHOD: SNELLEN - LINEAR

## 2021-06-08 ASSESSMENT — SLIT LAMP EXAM - LIDS
COMMENTS: NORMAL
COMMENTS: NORMAL

## 2021-06-08 ASSESSMENT — EXTERNAL EXAM - LEFT EYE: OS_EXAM: NORMAL

## 2021-06-08 NOTE — NURSING NOTE
Chief Complaints and History of Present Illnesses   Patient presents with     Glaucoma Follow-Up     Primary open angle glaucoma (POAG) of both eyes, severe stage     Chief Complaint(s) and History of Present Illness(es)     Glaucoma Follow-Up     Laterality: both eyes    Associated symptoms: redness.  Negative for headache, eye pain and tearing    Comments: Primary open angle glaucoma (POAG) of both eyes, severe stage              Comments     Redness Left eye , not new     Yarelis Villalobos COT 4:02 PM June 8, 2021

## 2021-06-08 NOTE — PROGRESS NOTES
Chief Complaint/Presenting Concern: Here for glaucoma evaluation     History of Present Illness:   Shayne Fiore is a 71 year old patient who presents for evaluation of glaucoma. Patient has lost vision completely in the left eye due to glaucoma. He was last examined by Dr. Villalobos and was started on latanoprost at bedtime each eye, patient took the medication and then ran out of the drop and did not get any refills afterwards. He reports that the left eye is painful at times, currently not hurting him.     Relevant Past Medical/Family/Social History: BPH, Prostate cancer, Anemia     Relevant Review of Systems: None Relevant      Diagnosis: Primary open angle glaucoma each eye   Originally from [facility], etiology of vision loss left eye   Previous glaucoma surgery/laser:  - s/p Trab right eye   Maximum intraocular pressure 45 mmHg left eye   Currently Meds: stopped using the latanoprost   CCT: 456/480  Trauma history: negative  Steroid exposure: negative  Vasospastic disease: Migrane/Raynaud phenomenon: negative  A past hemodynamic crisis or Low BP:: negative  Meds AEs/intolerance: None     Today's testing:  /55 mmHg   Visual field June 8, 2021:  Right eye - central island more constricted compared to prior VF, reliable  OCT Optic Nerve RNFL Spectralis June 8, 2021  Right eye: diffuse RNFL thinning, worse superiorly     Additional Ocular History:    2, Blind painful eye, left eye   -B scan (06/2020) with hyperechoic debri, favor PVD; vitritis less likely.  -No pain or headache currently, despite elevated IOP.     3. Pterygium, left eye    4. Pterygium right eye  -S/P removal.     5. Tino nodule, right eye   -Notable punctate epitheliopathy, and irregular surface, no epi defect     6. Brunescent Cataract left eye     7. CARINE each eye     8. ACIOL right eye  - 02/15/17 with Dr. Varma. H/O subluxed IOL right eye.     Plan/Recommendations:    Discussed findings with patient.    Worsening of VF right eye and IOP  above target, would aim for low IOP given advanced glaucoma damage and monocular status. Long discussion with the patient on the importance of taking the glaucoma drops and the risk of going completely blind if IOP is not well controlled.     Restart Latanoprost at bedtime each eye     Discussed with patient regarding alternative options for management of blind painful eye, including enucleation; patient would prefer to attempt medical management first    RTC 3 weeks [facility], IOP    Physician Attestation     Attending Physician Attestation:  Complete documentation of historical and exam elements from today's encounter can be found in the full encounter summary report (not reduplicated in this progress note). I personally obtained the chief complaint(s) and history of present illness. I confirmed and edited as necessary the review of systems, past medical/surgical history, family history, social history, and examination findings as documented by others; and I examined the patient myself. I personally reviewed the relevant tests, images, and reports as documented above. I personally reviewed the ophthalmic test(s) associated with this encounter. I formulated and edited as necessary the assessment and plan and discussed the findings and management plan with the patient and any family members present at the time of the visit.  Reji Acharya M.D., Glaucoma, June 8, 2021

## 2021-06-30 ENCOUNTER — OFFICE VISIT (OUTPATIENT)
Dept: FAMILY MEDICINE | Facility: CLINIC | Age: 71
End: 2021-06-30
Payer: COMMERCIAL

## 2021-06-30 ENCOUNTER — ANCILLARY PROCEDURE (OUTPATIENT)
Dept: GENERAL RADIOLOGY | Facility: CLINIC | Age: 71
End: 2021-06-30
Attending: NURSE PRACTITIONER
Payer: COMMERCIAL

## 2021-06-30 VITALS
OXYGEN SATURATION: 99 % | DIASTOLIC BLOOD PRESSURE: 63 MMHG | HEART RATE: 70 BPM | TEMPERATURE: 98 F | SYSTOLIC BLOOD PRESSURE: 121 MMHG

## 2021-06-30 DIAGNOSIS — M79.671 RIGHT FOOT PAIN: ICD-10-CM

## 2021-06-30 DIAGNOSIS — R50.9 ELEVATED TEMPERATURE: ICD-10-CM

## 2021-06-30 DIAGNOSIS — M79.671 RIGHT FOOT PAIN: Primary | ICD-10-CM

## 2021-06-30 LAB
ALBUMIN SERPL-MCNC: 2.2 G/DL (ref 3.4–5)
ALP SERPL-CCNC: 114 U/L (ref 40–150)
ALT SERPL W P-5'-P-CCNC: 62 U/L (ref 0–70)
ANION GAP SERPL CALCULATED.3IONS-SCNC: 7 MMOL/L (ref 3–14)
AST SERPL W P-5'-P-CCNC: 226 U/L (ref 0–45)
BILIRUB SERPL-MCNC: 0.5 MG/DL (ref 0.2–1.3)
BUN SERPL-MCNC: 25 MG/DL (ref 7–30)
CALCIUM SERPL-MCNC: 8.5 MG/DL (ref 8.5–10.1)
CHLORIDE SERPL-SCNC: 100 MMOL/L (ref 94–109)
CO2 SERPL-SCNC: 28 MMOL/L (ref 20–32)
CREAT SERPL-MCNC: 0.75 MG/DL (ref 0.66–1.25)
ERYTHROCYTE [DISTWIDTH] IN BLOOD BY AUTOMATED COUNT: 13.4 % (ref 10–15)
GFR SERPL CREATININE-BSD FRML MDRD: >90 ML/MIN/{1.73_M2}
GLUCOSE SERPL-MCNC: 119 MG/DL (ref 70–99)
HCT VFR BLD AUTO: 35.8 % (ref 40–53)
HGB BLD-MCNC: 11.3 G/DL (ref 13.3–17.7)
MCH RBC QN AUTO: 29.1 PG (ref 26.5–33)
MCHC RBC AUTO-ENTMCNC: 31.6 G/DL (ref 31.5–36.5)
MCV RBC AUTO: 92 FL (ref 78–100)
PLATELET # BLD AUTO: 266 10E9/L (ref 150–450)
POTASSIUM SERPL-SCNC: 4.4 MMOL/L (ref 3.4–5.3)
PROT SERPL-MCNC: 7.2 G/DL (ref 6.8–8.8)
RBC # BLD AUTO: 3.88 10E12/L (ref 4.4–5.9)
SODIUM SERPL-SCNC: 135 MMOL/L (ref 133–144)
URATE SERPL-MCNC: 11.3 MG/DL (ref 3.5–7.2)
WBC # BLD AUTO: 7.7 10E9/L (ref 4–11)

## 2021-06-30 PROCEDURE — 36415 COLL VENOUS BLD VENIPUNCTURE: CPT | Performed by: PATHOLOGY

## 2021-06-30 PROCEDURE — 80053 COMPREHEN METABOLIC PANEL: CPT | Performed by: PATHOLOGY

## 2021-06-30 PROCEDURE — 99213 OFFICE O/P EST LOW 20 MIN: CPT | Performed by: NURSE PRACTITIONER

## 2021-06-30 PROCEDURE — 73620 X-RAY EXAM OF FOOT: CPT | Mod: RT | Performed by: RADIOLOGY

## 2021-06-30 PROCEDURE — 84550 ASSAY OF BLOOD/URIC ACID: CPT | Performed by: PATHOLOGY

## 2021-06-30 PROCEDURE — 85027 COMPLETE CBC AUTOMATED: CPT | Performed by: PATHOLOGY

## 2021-06-30 ASSESSMENT — PAIN SCALES - GENERAL: PAINLEVEL: NO PAIN (0)

## 2021-06-30 NOTE — PROGRESS NOTES
KOLE Fiore is a 71 year old man who presents with multiple concerns.   Son is acting as , waiver is signed in chart.  Chief Complaint   Patient presents with     Pain     Pt has pain on R foot, fatigue and fever x2 weeks ago.   Right foot pain; intermittent pain for the past month.  Pain is worse at base of great toe on right foot.  Pain with standing and walking.  No known injury.  Elevated temperature; has not checked his temperature, however feels like he has had an elevated temperature intermittently for the past week.  He does receive treatment for TB, through a clinic in [place].    Problem, Medication and Allergy Lists were reviewed and updated if needed.    Patient is an established patient of this clinic.         Review of Systems:   Review of Systems     Constitutional:  Positive for fever and fatigue. Negative for chills.   Musculoskeletal:  Positive for joint swelling and arthralgias.               Physical Exam:     Vitals:    06/30/21 1155   BP: 121/63   Pulse: 70   Temp: 98  F (36.7  C)   SpO2: 99%     There is no height or weight on file to calculate BMI.  Vitals were reviewed and were normal     Physical Exam  Vitals signs reviewed.   Constitutional:       Appearance: Normal appearance.   HENT:      Head: Normocephalic.   Cardiovascular:      Rate and Rhythm: Normal rate and regular rhythm.      Pulses: Normal pulses.      Heart sounds: Normal heart sounds.   Pulmonary:      Effort: Pulmonary effort is normal.      Breath sounds: Normal breath sounds.   Musculoskeletal:      Right foot: Decreased range of motion. Bony tenderness and swelling present.        Feet:    Skin:     General: Skin is warm.   Neurological:      General: No focal deficit present.      Mental Status: He is alert and oriented to person, place, and time.   Psychiatric:         Mood and Affect: Mood normal.         Behavior: Behavior normal.           Results:   Lab and x-ray  pending.  Assessment and Plan     1. Right foot pain    - XR Foot Right 2 Views; Future  - Uric acid; Future  - CBC with platelets; Future  - Comprehensive metabolic panel; Future    2. Elevated temperature    First, have lab and x-ray completed.  Follow-up on results and to develop a care plan.  Options for treatment and follow-up care were reviewed with the patient. Shayne Fiore  engaged in the decision making process and verbalized understanding of the options discussed and agreed with the final plan.  Sarika Downs, YG, CNP

## 2021-06-30 NOTE — NURSING NOTE
Chief Complaint   Patient presents with     Pain     Pt has pain on R foot, fatigue and fever x2 weeks ago.     Radha Mcclure, A 11:56 AM  6/30/2021

## 2021-06-30 NOTE — PATIENT INSTRUCTIONS
[facility] Medication Refill Request Information:  * Please contact your pharmacy regarding ANY request for medication refills.  ** NP Clinic Prescription Fax = [phone]  * Please allow 3 business days for routine medication refills.  * Please allow 5 business days for controlled substance medication refills.     [facility] Test Result notification information:  *You will be notified with in 7-10 days of your appointment day regarding the results of your test.  If you are on MyChart you will be notified as soon as the provider has reviewed the results and signed off on them.    [facility]: [phone]     If you have questions regarding Covid-19 and the Covid-19 vaccine, please visit this website.    [email or web address]

## 2021-07-05 ENCOUNTER — OFFICE VISIT (OUTPATIENT)
Dept: FAMILY MEDICINE | Facility: CLINIC | Age: 71
End: 2021-07-05
Payer: COMMERCIAL

## 2021-07-05 VITALS
DIASTOLIC BLOOD PRESSURE: 64 MMHG | TEMPERATURE: 98.1 F | HEART RATE: 77 BPM | OXYGEN SATURATION: 99 % | SYSTOLIC BLOOD PRESSURE: 134 MMHG

## 2021-07-05 DIAGNOSIS — R07.89 OTHER CHEST PAIN: Primary | ICD-10-CM

## 2021-07-05 DIAGNOSIS — M1A.0710 CHRONIC GOUT OF RIGHT FOOT, UNSPECIFIED CAUSE: ICD-10-CM

## 2021-07-05 DIAGNOSIS — R06.02 SHORTNESS OF BREATH: ICD-10-CM

## 2021-07-05 DIAGNOSIS — R10.13 EPIGASTRIC PAIN: ICD-10-CM

## 2021-07-05 DIAGNOSIS — R07.89 OTHER CHEST PAIN: ICD-10-CM

## 2021-07-05 DIAGNOSIS — M79.674 GREAT TOE PAIN, RIGHT: ICD-10-CM

## 2021-07-05 LAB
CRP SERPL-MCNC: 28.3 MG/L (ref 0–8)
INTERPRETATION ECG - MUSE: NORMAL
TROPONIN I SERPL-MCNC: <0.015 UG/L (ref 0–0.04)

## 2021-07-05 PROCEDURE — 99000 SPECIMEN HANDLING OFFICE-LAB: CPT | Performed by: PATHOLOGY

## 2021-07-05 PROCEDURE — 36415 COLL VENOUS BLD VENIPUNCTURE: CPT | Performed by: PATHOLOGY

## 2021-07-05 PROCEDURE — 82552 ASSAY OF CPK IN BLOOD: CPT | Mod: 90 | Performed by: PATHOLOGY

## 2021-07-05 PROCEDURE — 84484 ASSAY OF TROPONIN QUANT: CPT | Performed by: PATHOLOGY

## 2021-07-05 PROCEDURE — 82550 ASSAY OF CK (CPK): CPT | Mod: 90 | Performed by: PATHOLOGY

## 2021-07-05 PROCEDURE — 99214 OFFICE O/P EST MOD 30 MIN: CPT | Performed by: NURSE PRACTITIONER

## 2021-07-05 PROCEDURE — 86140 C-REACTIVE PROTEIN: CPT | Performed by: PATHOLOGY

## 2021-07-05 RX ORDER — COLCHICINE 0.6 MG/1
TABLET ORAL
Qty: 20 TABLET | Refills: 0 | Status: SHIPPED | OUTPATIENT
Start: 2021-07-05

## 2021-07-05 ASSESSMENT — ANXIETY QUESTIONNAIRES
7. FEELING AFRAID AS IF SOMETHING AWFUL MIGHT HAPPEN: NOT AT ALL
6. BECOMING EASILY ANNOYED OR IRRITABLE: NOT AT ALL
7. FEELING AFRAID AS IF SOMETHING AWFUL MIGHT HAPPEN: NOT AT ALL
3. WORRYING TOO MUCH ABOUT DIFFERENT THINGS: NOT AT ALL
GAD7 TOTAL SCORE: 0
5. BEING SO RESTLESS THAT IT IS HARD TO SIT STILL: NOT AT ALL
4. TROUBLE RELAXING: NOT AT ALL
2. NOT BEING ABLE TO STOP OR CONTROL WORRYING: NOT AT ALL
1. FEELING NERVOUS, ANXIOUS, OR ON EDGE: NOT AT ALL
GAD7 TOTAL SCORE: 0

## 2021-07-05 ASSESSMENT — ENCOUNTER SYMPTOMS
SHORTNESS OF BREATH: 1
FATIGUE: 1
CHILLS: 0
FEVER: 0

## 2021-07-05 ASSESSMENT — PAIN SCALES - GENERAL: PAINLEVEL: MODERATE PAIN (4)

## 2021-07-05 NOTE — PROGRESS NOTES
[facility]       Shayne Fiore is a 71 year old man who presents for follow up on right great toe pain and new chest pain.   Son is here to interpret for his father, a waiver is signed.   Chief Complaint   Patient presents with     Pain     Pt has pain on L chest and upper abdominal.   Chest pain/Epigastric pain/Short of breath:started one week ago. Does have history of TB, pleural scarring and bronchiectasis. Pulmonary working with him on this. Has had EKG and Echo in the past.   Great toe pain, right/Gout, right foot:X ray shows arthritis, labs show gout. No currently taking anything orally. Swelling, redness and warmth have improved the appearance of his foot/great toe.     Problem, Medication and Allergy Lists were reviewed and updated if needed.    Patient is an established patient of this clinic.         Review of Systems:   Review of Systems     Constitutional:  Positive for fatigue. Negative for fever and chills.   Respiratory:   Positive for shortness of breath.    Cardiovascular:  Positive for chest pain.               Physical Exam:     Vitals:    07/05/21 0851 07/05/21 0852   BP: (!) 146/70 134/64   Pulse: 77    Temp: 98.1  F (36.7  C)    SpO2: 99%      There is no height or weight on file to calculate BMI.  Vitals were reviewed and were normal.     Physical Exam  Vitals signs reviewed.   Constitutional:       Appearance: Normal appearance.   HENT:      Head: Normocephalic.   Cardiovascular:      Rate and Rhythm: Normal rate and regular rhythm.      Pulses: Normal pulses.      Heart sounds: Normal heart sounds.   Pulmonary:      Effort: Pulmonary effort is normal.      Breath sounds: Normal breath sounds.   Musculoskeletal: Normal range of motion.      Right foot: Normal range of motion. Bony tenderness present.        Feet:    Skin:     General: Skin is warm and dry.   Neurological:      General: No focal deficit present.      Mental Status: He is alert and oriented to person, place, and  time.   Psychiatric:         Mood and Affect: Mood normal.         Behavior: Behavior normal.         Results:   Impression:  1. No acute osseous abnormality.  2. High-grade degenerative changes of the first MTP joint as described  above.     Component      Latest Ref Rng & Units 6/30/2021   Sodium      133 - 144 mmol/L 135   Potassium      3.4 - 5.3 mmol/L 4.4   Chloride      94 - 109 mmol/L 100   Carbon Dioxide      20 - 32 mmol/L 28   Anion Gap      3 - 14 mmol/L 7   Glucose      70 - 99 mg/dL 119 (H)   Urea Nitrogen      7 - 30 mg/dL 25   Creatinine      0.66 - 1.25 mg/dL 0.75   GFR Estimate      >60 mL/min/1.73:m2 >90   GFR Estimate If Black      >60 mL/min/1.73:m2 >90   Calcium      8.5 - 10.1 mg/dL 8.5   Bilirubin Total      0.2 - 1.3 mg/dL 0.5   Albumin      3.4 - 5.0 g/dL 2.2 (L)   Protein Total      6.8 - 8.8 g/dL 7.2   Alkaline Phosphatase      40 - 150 U/L 114   ALT      0 - 70 U/L 62   AST      0 - 45 U/L 226 (H)   WBC      4.0 - 11.0 10e9/L 7.7   RBC Count      4.4 - 5.9 10e12/L 3.88 (L)   Hemoglobin      13.3 - 17.7 g/dL 11.3 (L)   Hematocrit      40.0 - 53.0 % 35.8 (L)   MCV      78 - 100 fl 92   MCH      26.5 - 33.0 pg 29.1   MCHC      31.5 - 36.5 g/dL 31.6   RDW      10.0 - 15.0 % 13.4   Platelet Count      150 - 450 10e9/L 266   Uric Acid      3.5 - 7.2 mg/dL 11.3 (H)     Assessment and Plan     1. Other chest pain    - EKG 12-lead complete w/read - Clinics  - Troponin I; Future  - Creatine Kinase Isoenzymes; Future  - CRP inflammation; Future    2. Epigastric pain      3. Shortness of breath      4. Great toe pain, right    - colchicine (COLCYRS) 0.6 MG tablet; Take 1 tablet twice daily until pain resolves.  Dispense: 20 tablet; Refill: 0    5. Chronic gout of right foot, unspecified cause    - colchicine (COLCYRS) 0.6 MG tablet; Take 1 tablet twice daily until pain resolves.  Dispense: 20 tablet; Refill: 0    EKG today, looks ok without change since last time completed. Labs pending. Start  Colchicine for gout. Next, will order treatment for chest pain once all results are complete. If his pain worsens, he will return or present for the ER for evaluation. Options for treatment and follow-up care were reviewed with the patient. Shayne Fiore  engaged in the decision making process and verbalized understanding of the options discussed and agreed with the final plan.  Sarika Downs, YG, CNP

## 2021-07-05 NOTE — PATIENT INSTRUCTIONS

## 2021-07-05 NOTE — NURSING NOTE
71 year old  Chief Complaint   Patient presents with     Pain     Pt has pain on L chest and upper abdominal.       Blood pressure 134/64, pulse 77, temperature 98.1  F (36.7  C), SpO2 99 %. There is no height or weight on file to calculate BMI.  BP completed using cuff size:      Radha Mcclure MA  July 5, 2021 8:54 AM

## 2021-07-06 ASSESSMENT — ANXIETY QUESTIONNAIRES: GAD7 TOTAL SCORE: 0

## 2021-07-07 LAB
CK BB CFR SERPL ELPH: 15 % (ref 0–0)
CK MACRO1 CFR SERPL: 0 % (ref 0–0)
CK MACRO2 CFR SERPL: 0 % (ref 0–0)
CK MB CFR SERPL ELPH: 0 % (ref 0–4)
CK MM CFR SERPL ELPH: 85 % (ref 96–100)
CK SERPL-CCNC: 44 U/L (ref 20–200)

## 2021-07-09 ASSESSMENT — ENCOUNTER SYMPTOMS
FEVER: 1
ARTHRALGIAS: 1
FATIGUE: 1
CHILLS: 0
JOINT SWELLING: 1

## 2021-07-23 ENCOUNTER — LAB (OUTPATIENT)
Dept: LAB | Facility: CLINIC | Age: 71
End: 2021-07-23

## 2021-07-23 ENCOUNTER — OFFICE VISIT (OUTPATIENT)
Dept: INTERNAL MEDICINE | Facility: CLINIC | Age: 71
End: 2021-07-23
Payer: COMMERCIAL

## 2021-07-23 VITALS
TEMPERATURE: 98.4 F | BODY MASS INDEX: 17.2 KG/M2 | HEART RATE: 80 BPM | WEIGHT: 130.4 LBS | OXYGEN SATURATION: 97 % | SYSTOLIC BLOOD PRESSURE: 114 MMHG | DIASTOLIC BLOOD PRESSURE: 62 MMHG

## 2021-07-23 DIAGNOSIS — E87.1 HYPONATREMIA: ICD-10-CM

## 2021-07-23 DIAGNOSIS — Z86.11 HISTORY OF ACTIVE TUBERCULOSIS: ICD-10-CM

## 2021-07-23 DIAGNOSIS — J15.1 PNEUMONIA DUE TO PSEUDOMONAS SPECIES, UNSPECIFIED LATERALITY, UNSPECIFIED PART OF LUNG (H): Primary | ICD-10-CM

## 2021-07-23 LAB
ANION GAP SERPL CALCULATED.3IONS-SCNC: 4 MMOL/L (ref 3–14)
BUN SERPL-MCNC: 19 MG/DL (ref 7–30)
CALCIUM SERPL-MCNC: 8.8 MG/DL (ref 8.5–10.1)
CHLORIDE BLD-SCNC: 101 MMOL/L (ref 94–109)
CO2 SERPL-SCNC: 29 MMOL/L (ref 20–32)
CREAT SERPL-MCNC: 0.8 MG/DL (ref 0.66–1.25)
GFR SERPL CREATININE-BSD FRML MDRD: 90 ML/MIN/1.73M2
GLUCOSE BLD-MCNC: 88 MG/DL (ref 70–99)
MAGNESIUM SERPL-MCNC: 1.8 MG/DL (ref 1.6–2.3)
POTASSIUM BLD-SCNC: 4 MMOL/L (ref 3.4–5.3)
SODIUM SERPL-SCNC: 134 MMOL/L (ref 133–144)

## 2021-07-23 PROCEDURE — 93000 ELECTROCARDIOGRAM COMPLETE: CPT | Performed by: PEDIATRICS

## 2021-07-23 PROCEDURE — 83735 ASSAY OF MAGNESIUM: CPT | Performed by: PATHOLOGY

## 2021-07-23 PROCEDURE — 36415 COLL VENOUS BLD VENIPUNCTURE: CPT | Performed by: PATHOLOGY

## 2021-07-23 PROCEDURE — 80048 BASIC METABOLIC PNL TOTAL CA: CPT | Performed by: PATHOLOGY

## 2021-07-23 PROCEDURE — 99215 OFFICE O/P EST HI 40 MIN: CPT | Mod: 25 | Performed by: PEDIATRICS

## 2021-07-23 PROCEDURE — 99417 PROLNG OP E/M EACH 15 MIN: CPT | Performed by: PEDIATRICS

## 2021-07-23 RX ORDER — LEVOFLOXACIN 750 MG/1
750 TABLET, FILM COATED ORAL
COMMUNITY
Start: 2021-07-18 | End: 2021-08-01

## 2021-07-23 RX ORDER — OMEPRAZOLE 20 MG/1
20 TABLET, DELAYED RELEASE ORAL
COMMUNITY
Start: 2021-07-17 | End: 2021-08-16

## 2021-07-23 RX ORDER — SODIUM CHLORIDE FOR INHALATION 3 %
4 VIAL, NEBULIZER (ML) INHALATION
COMMUNITY
Start: 2021-07-17 | End: 2021-08-16

## 2021-07-23 ASSESSMENT — PAIN SCALES - GENERAL: PAINLEVEL: NO PAIN (0)

## 2021-07-23 NOTE — NURSING NOTE
Chief Complaint   Patient presents with     [facility] F/U       Thierry Leary CMA (MA) at 8:51 AM on 7/23/2021

## 2021-07-23 NOTE — PROGRESS NOTES
"Dear patient. Thank you for visiting with me. I want you to feel respected, understood, and empowered. \"Respect\" is valuing you as much as I would a close family member. \"Empowerment\" happens when you are fully informed, and can make the best possible decision for you.  Please ask me questions!  Challenge anything that is not clear.    Medical records are primarily used as memory aids for me and my colleagues. Things to know about my documentation style:  - The 'problem list' includes current symptoms or diagnoses, and some problems that are resolved but may return. I use the past medical history for problems not expected to return.  - I use single quotation marks for things that you or I said, when I want to clarify who was speaking.  - I use double quotation marks when copying a term from elsewhere in your records. Italics (besides here) may also denote a quotation.  If you have questions or concerns, please contact me; I will reply as soon as time allows.    Context    Shayne Fiore is a 71 year old man, here with his son, with concerns including:  Chief Complaint   Patient presents with     Hospital F/U     PCP: Isai Caruso   Visit type: problem-oriented    /62 (BP Location: Right arm, Patient Position: Sitting, Cuff Size: Adult Regular)   Pulse 80   Temp 98.4  F (36.9  C) (Oral)   Wt 59.1 kg (130 lb 6.4 oz)   SpO2 97%   BMI 17.20 kg/m      Problems and progress      Patient was admitted to the hospital from 7/15-7/17 with bronchiectasis, Pseudomonas, hyponatremia, and a diagnostic comment of \"history of pulmonary TB, possible active infection?\"  Son says he told the hospitalist that he is not improving despite being treated with TB medications for the past 6 months.  I gather that this was being done and observed by the public health department in [place].  At some point bronchial alveolar lavage was done 6/23 that grew Pseudomonas, but it does not appear that treatment was given. " Now he is doing much better on antibiotics, son says.      Hyponatremia.  He was discharged with a rather abnormal BMP, although it was better than 7/15 Na = 123.  The hospitalists wrote:  Improved 123-->127 with IVF. Urine studies equivocal. Mild hyponatremia in past. Family reports poor PO intake recently due to feeling ill. Remained stable off IVFs. Family and patient requesting discharge. They feel he will eat better at home. Will need close PCP follow up to ensure not worsening. Family updated on plan to have BMP drawn next week.  Son says 'He doesn't drink much' water. He has started to eat better at home.    Will recheck magnesium and BMP (ADDENDUM: both were reassuring)    Son believes the hospitalists wanted another ECG. The 7/5 ECG looks good, and the 7/15 report (from [facility]) also looks good.  Our ECG showed PACs, otherwise no change from before.        Other comments      Other physical exam  Physical Exam       About this visit:  Time note (d0+34220, 69-83'): The total time (on the date of service) for this service was 72 minutes, including discussion/face-to-face, chart review, interpretation not otherwise reported, documentation, and updating of the computerized record.  Comment about data reviewed: I personally reviewed, interpreted, and/or confirmed interpretation of ECGs and hospital notes

## 2021-07-25 LAB
ATRIAL RATE - MUSE: 83 BPM
DIASTOLIC BLOOD PRESSURE - MUSE: NORMAL MMHG
INTERPRETATION ECG - MUSE: NORMAL
P AXIS - MUSE: 74 DEGREES
PR INTERVAL - MUSE: 138 MS
QRS DURATION - MUSE: 92 MS
QT - MUSE: 356 MS
QTC - MUSE: 418 MS
R AXIS - MUSE: 56 DEGREES
SYSTOLIC BLOOD PRESSURE - MUSE: NORMAL MMHG
T AXIS - MUSE: 58 DEGREES
VENTRICULAR RATE- MUSE: 83 BPM

## 2021-09-21 ENCOUNTER — TELEPHONE (OUTPATIENT)
Dept: FAMILY MEDICINE | Facility: CLINIC | Age: 71
End: 2021-09-21

## 2021-09-21 NOTE — TELEPHONE ENCOUNTER
Spoke to son Malka. Form needs to be completed by PCP. Scheduled pt for a telephone call with Dr Caruso on 10/4/21. If Dr. Caruso  Can not do form over the phone pt is also scheduled on 10/22/21 in person. Pt is okay with this.      Radha Mcclure, CAM 12:51 PM  9/21/2021  [facility] [phone]

## 2021-10-04 ENCOUNTER — VIRTUAL VISIT (OUTPATIENT)
Dept: INTERNAL MEDICINE | Facility: CLINIC | Age: 71
End: 2021-10-04
Payer: COMMERCIAL

## 2021-10-04 DIAGNOSIS — H40.1133 PRIMARY OPEN ANGLE GLAUCOMA (POAG) OF BOTH EYES, SEVERE STAGE: Primary | ICD-10-CM

## 2021-10-04 DIAGNOSIS — G31.84 MILD COGNITIVE IMPAIRMENT: Primary | ICD-10-CM

## 2021-10-04 PROCEDURE — 99443 PR PHYSICIAN TELEPHONE EVALUATION 21-30 MIN: CPT | Performed by: PEDIATRICS

## 2021-10-04 NOTE — NURSING NOTE
Chief Complaint   Patient presents with     Recheck Medication     MEDICAL DISABILITY FORM       Yesenia Velez, EMT at 10:59 AM on 10/4/2021

## 2021-10-04 NOTE — PROGRESS NOTES
"Dear patient. Thank you for visiting with me. I want you to feel respected, understood, and empowered. \"Respect\" is valuing you as much as I would a close family member. \"Empowerment\" happens when you are fully informed, and can make the best possible decision for you.  Please ask me questions!  Challenge anything that is not clear.    Medical records are primarily used as memory aids for me and my colleagues. Things to know about my documentation style:  - The 'problem list' includes current symptoms or diagnoses, and some problems that are resolved but may return. I use the past medical history for problems not expected to return.  - I use single quotation marks for things that you or I said, when I want to clarify who was speaking.  - I use double quotation marks when copying a term from elsewhere in your records. Italics (besides here) may also denote a quotation.  If you have questions or concerns, please contact me; I will reply as soon as time allows.      Virtual Visit Details    Type of service:  Telephone Visit    Start Time: 11:13 AM    End Time:11:30 AM    Originating Location (pt. Location): Home    Distant Location (provider location):  [facility] [place]     Platform used for Visit: Align[employer]    PCP: Isai Caruso  Visit type: problem-oriented  Time note (e5+34443, 69-83'): The total time (on the date of service) for this service was 72 minutes, including discussion/face-to-face, chart review, interpretation not otherwise reported, documentation, and updating of the computerized record.        Context    Shayne Fiore is a 71 year old man, here with his son, with concerns including:  Chief Complaint   Patient presents with     Recheck Medication     MEDICAL DISABILITY FORM       History, update, and/or problems    The patient and his son are calling regarding the need for completion of a form from the [place] citizenship and immigration services Department of " "homeland security, \"medical certification for disability exceptions.\"  I obtained this form online, and partially completed it using memory and records.  I am unsure if the form as completed over telephone will be sufficient, since there is some identification requirements necessary, I am not sure if what we have done before is enough.      Mild cognitive impairment due to age and multiple chronic medical issues  The patient is mainly cared for by his son and other family members.  He did well until immigrating to the [place] in [place].  Over the years he has had gradually slowing mental speed, and now needs some help with his activities of daily living including walking, dressing, and bathing.  He can feed himself if someone else prepares his food.  He has not been able to master much English, written or spoken -I recall that he has said thank you to me and greeted me, and there are few words he has understood in context perhaps.  He has a psychologist appointment coming up, but this does not appear to be in our system it is not scheduled with us.  I have not previously needed to achieve a diagnosis on this, since his family cares for him so well.  On the form I put mild cognitive impairment, impaired mobility, and impaired ability to complete ADLs.  He also has hearing impairment that worsens his problem, and he has had difficulty tolerating hearing aids thus far -when he wears them they increase the noise a lot which leads to discomfort.  He has at least partial evidence of 'mask facies' but I have not previously noted or examined him for the possibility of Parkinson's  I have no social concerns about this gentlemen's safety, since care from his family is so excellent.  In my medical opinion, it seems extremely unlikely that he would be able to achieve English fluency enough to have a conversation about [place] civics.  However, I do not see this as a contraindication to him being allowed to remain in this " country along with his family.  I would be willing to perform imaging or send him for consultation if it is absolutely necessary, but there does not seem to be any medical need otherwise.  I advised them that we may need to bring him in for successful completion of the paperwork.  It appears from the form that sometimes official medical examiners will conduct the evaluation.  On this occasion I am wondering about the possibility of evaluating him for Alzheimer's and the possibility of starting a medication.  I will bring this up in person when we meet.

## 2021-10-05 ENCOUNTER — OFFICE VISIT (OUTPATIENT)
Dept: OPHTHALMOLOGY | Facility: CLINIC | Age: 71
End: 2021-10-05
Attending: OPHTHALMOLOGY
Payer: COMMERCIAL

## 2021-10-05 DIAGNOSIS — Z96.1 PSEUDOPHAKIA OF RIGHT EYE: Primary | ICD-10-CM

## 2021-10-05 DIAGNOSIS — H40.1133 PRIMARY OPEN ANGLE GLAUCOMA (POAG) OF BOTH EYES, SEVERE STAGE: ICD-10-CM

## 2021-10-05 DIAGNOSIS — H18.459 SALZMANN'S NODULAR DEGENERATION: ICD-10-CM

## 2021-10-05 PROCEDURE — G0463 HOSPITAL OUTPT CLINIC VISIT: HCPCS

## 2021-10-05 PROCEDURE — 99214 OFFICE O/P EST MOD 30 MIN: CPT | Mod: GC | Performed by: OPHTHALMOLOGY

## 2021-10-05 PROCEDURE — 92083 EXTENDED VISUAL FIELD XM: CPT | Performed by: OPHTHALMOLOGY

## 2021-10-05 ASSESSMENT — VISUAL ACUITY
OS_CC: NLP
OD_CC: 20/125
CORRECTION_TYPE: GLASSES
METHOD: SNELLEN - LINEAR

## 2021-10-05 ASSESSMENT — TONOMETRY
OD_IOP_MMHG: 20
OS_IOP_MMHG: 60
OD_IOP_MMHG: 20
OS_IOP_MMHG: 64
IOP_METHOD: APPLANATION
IOP_METHOD: TONOPEN

## 2021-10-05 ASSESSMENT — CONF VISUAL FIELD
OS_SUPERIOR_NASAL_RESTRICTION: 1
OS_INFERIOR_NASAL_RESTRICTION: 1
OS_INFERIOR_TEMPORAL_RESTRICTION: 1
OS_SUPERIOR_TEMPORAL_RESTRICTION: 1

## 2021-10-05 ASSESSMENT — EXTERNAL EXAM - RIGHT EYE: OD_EXAM: NORMAL

## 2021-10-05 ASSESSMENT — CUP TO DISC RATIO: OD_RATIO: 0.9

## 2021-10-05 ASSESSMENT — SLIT LAMP EXAM - LIDS
COMMENTS: NORMAL
COMMENTS: NORMAL

## 2021-10-05 ASSESSMENT — EXTERNAL EXAM - LEFT EYE: OS_EXAM: NORMAL

## 2021-10-05 NOTE — PROGRESS NOTES
Patient has lost vision completely in the left eye due to glaucoma. He was examined by Dr. Villalobos and started on latanoprost at bedtime each eye, patient took the medication and then ran out of the drop and did not get any refills afterwards.    Chief Complaint/Presenting Concern: Here for glaucoma follow up    History of Present Illness:   Shayne Fiore is a 71 year old patient who presents for follow up of glaucoma. At the last appointment, the patient was re-initiated on latanoprost and he used his first bottle of latanoprost until it ran out then did not get a refill. He last used latanoprost 2-3 months ago. He notes intermittent left eye pain, not currently painful.    Relevant Past Medical/Family/Social History: BPH, Prostate cancer, Anemia     Relevant Review of Systems: None Relevant      Diagnosis: Primary open angle glaucoma each eye   Originally from [facility], etiology of vision loss left eye   Previous glaucoma surgery/laser:  - s/p Trab right eye   Maximum intraocular pressure 45 mmHg left eye   Currently Meds: stopped using the latanoprost   CCT: 456/480  Trauma history: negative  Steroid exposure: negative  Vasospastic disease: Migrane/Raynaud phenomenon: negative  A past hemodynamic crisis or Low BP:: negative  Meds AEs/intolerance: None   OCT Optic Nerve RNFL Spectralis June 8, 2021  Right eye: diffuse RNFL thinning, worse superiorly     Today's testing:  IOP 20/60  Visual field 10/05/21  Right eye - central island more constricted compared to prior VF, very unreliable high FP, FN     Additional Ocular History:    2, Blind painful eye, left eye   -B scan (06/2020) with hyperechoic debri, favor PVD; vitritis less likely.  -No pain or headache currently, despite elevated IOP.     3. Pterygium, left eye    4. Pterygium right eye  -S/P removal.     5. Tino nodule, right eye   -Notable punctate epitheliopathy, and irregular surface, no epi defect     6. Brunescent Cataract left eye     7. CARINE each  eye     8. ACIOL right eye  - 02/15/17 with Dr. Varma. H/O subluxed IOL right eye.     Plan/Recommendations:    Discussed findings with patient.    IOP above target right eye, would aim for low IOP given advanced glaucoma damage and monocular status. Long discussion with the patient on the importance of taking the glaucoma drops and the risk of going completely blind if IOP is not well controlled.     Restart Latanoprost at bedtime each eye     Discussed with patient regarding alternative options for management of blind painful eye, including enucleation; patient would prefer to attempt medical management first    RTC in 2 weeks [facility], IOP     Gina Alaniz MD  Ophthalmology Resident, PGY-3      Physician Attestation     Attending Physician Attestation:  Complete documentation of historical and exam elements from today's encounter can be found in the full encounter summary report (not reduplicated in this progress note). I personally obtained the chief complaint(s) and history of present illness. I confirmed and edited as necessary the review of systems, past medical/surgical history, family history, social history, and examination findings as documented by others; and I examined the patient myself. I personally reviewed the relevant tests, images, and reports as documented above. I personally reviewed the ophthalmic test(s) associated with this encounter, agree with the interpretation(s) as documented by the resident/fellow and have edited the corresponding report(s) as necessary. I formulated and edited as necessary the assessment and plan and discussed the findings and management plan with the patient and any family members present at the time of the visit.  Reji Acharya M.D., Glaucoma, October 5, 2021

## 2021-10-05 NOTE — NURSING NOTE
Chief Complaints and History of Present Illnesses   Patient presents with     Glaucoma Follow-Up     Chief Complaint(s) and History of Present Illness(es)     Glaucoma Follow-Up     Laterality: both eyes    Associated symptoms: Negative for eye pain, headache, nausea and burning              Comments     Pt here for POAG severe each eye. Pt reports eyes are comfortable. Vision has decreased since he was seen for bacteria in his lungs about 3 months ago. No new concerns.    Pt using:  Latanoprost at bedtime each eye- not using     TANA ACKERMAN 3:24 PM October 5, 2021

## 2021-10-09 ENCOUNTER — HEALTH MAINTENANCE LETTER (OUTPATIENT)
Age: 71
End: 2021-10-09

## 2021-10-21 ENCOUNTER — OFFICE VISIT (OUTPATIENT)
Dept: OPHTHALMOLOGY | Facility: CLINIC | Age: 71
End: 2021-10-21
Attending: OPHTHALMOLOGY
Payer: COMMERCIAL

## 2021-10-21 DIAGNOSIS — H25.12 SENILE NUCLEAR SCLEROSIS, LEFT: ICD-10-CM

## 2021-10-21 DIAGNOSIS — Z96.1 PSEUDOPHAKIA OF RIGHT EYE: Primary | ICD-10-CM

## 2021-10-21 DIAGNOSIS — H40.1133 PRIMARY OPEN ANGLE GLAUCOMA (POAG) OF BOTH EYES, SEVERE STAGE: ICD-10-CM

## 2021-10-21 PROCEDURE — G0463 HOSPITAL OUTPT CLINIC VISIT: HCPCS

## 2021-10-21 PROCEDURE — 99214 OFFICE O/P EST MOD 30 MIN: CPT | Performed by: OPHTHALMOLOGY

## 2021-10-21 RX ORDER — LATANOPROST 50 UG/ML
1 SOLUTION/ DROPS OPHTHALMIC AT BEDTIME
Qty: 7.5 ML | Refills: 11 | Status: SHIPPED | OUTPATIENT
Start: 2021-10-21 | End: 2023-06-19

## 2021-10-21 ASSESSMENT — SLIT LAMP EXAM - LIDS
COMMENTS: NORMAL
COMMENTS: NORMAL

## 2021-10-21 ASSESSMENT — REFRACTION_WEARINGRX
OD_CYLINDER: +8.00
OD_AXIS: 100
OS_SPHERE: BALANCE
OD_SPHERE: -6.50
OD_ADD: +3.00
SPECS_TYPE: BIFOCAL

## 2021-10-21 ASSESSMENT — VISUAL ACUITY
OS_CC: NLP
OD_CC: 20/125
CORRECTION_TYPE: GLASSES
METHOD: SNELLEN - LINEAR

## 2021-10-21 ASSESSMENT — TONOMETRY
OS_IOP_MMHG: 45
IOP_METHOD: TONOPEN
OD_IOP_MMHG: 12
IOP_METHOD: APPLANATION
OD_IOP_MMHG: 14

## 2021-10-21 ASSESSMENT — CUP TO DISC RATIO: OD_RATIO: 0.9

## 2021-10-21 ASSESSMENT — EXTERNAL EXAM - LEFT EYE: OS_EXAM: NORMAL

## 2021-10-21 ASSESSMENT — EXTERNAL EXAM - RIGHT EYE: OD_EXAM: NORMAL

## 2021-10-21 NOTE — PROGRESS NOTES
Patient has lost vision completely in the left eye due to glaucoma. He was examined by Dr. Villalobos and started on latanoprost at bedtime each eye, patient took the medication and then ran out of the drop and did not get any refills afterwards.    Chief Complaint/Presenting Concern: Here for glaucoma follow up    History of Present Illness:   Shayne Fiore is a 71 year old patient who presents for follow up of glaucoma. At the last appointment, the patient was re-initiated on latanoprost, his son is with him and assisting in history taking. Reports that he is using the drop regularly now. The left eye pain has improved with the use of latanoprost.     Relevant Past Medical/Family/Social History: BPH, Prostate cancer, Anemia     Relevant Review of Systems: None Relevant       Diagnosis: Primary open angle glaucoma each eye   Originally from [facility], etiology of vision loss left eye   Previous glaucoma surgery/laser:  - s/p Trab right eye   Maximum intraocular pressure 45 mmHg left eye   Currently Meds: stopped using the latanoprost   CCT: 456/480  Trauma history: negative  Steroid exposure: negative  Vasospastic disease: Migrane/Raynaud phenomenon: negative  A past hemodynamic crisis or Low BP:: negative  Meds AEs/intolerance: None   OCT Optic Nerve RNFL Spectralis June 8, 2021  Right eye: diffuse RNFL thinning, worse superiorly   Visual field 10/05/21  Right eye - central island more constricted compared to prior VF, very unreliable high FP, FN     Today's testing:  IOP 12/45 mmHg     Additional Ocular History:    2, Blind painful eye, left eye   -B scan (06/2020) with hyperechoic debri, favor PVD; vitritis less likely.  -No pain or headache currently, despite elevated IOP.     3. Pterygium, left eye    4. Pterygium right eye  -S/P removal.     5. Tino nodule, right eye   -Notable punctate epitheliopathy, and irregular surface, no epi defect     6. Brunescent Cataract left eye     7. CARINE each eye     8. ACIOL  right eye  - 02/15/17 with Dr. Varma. H/O subluxed IOL right eye.     Plan/Recommendations:    Discussed findings with patient.    IOP on target today right eye, would aim for low IOP given advanced glaucoma damage and monocular status. Good response to latanoprost.     Continue Latanoprost at bedtime each eye     Discussed with patient regarding alternative options for management of blind painful eye for left eye, including enucleation; patient would prefer to continue same for now.     Son reports that although Mr. Bella can see the number of the chart for 10/125 he does not see people well and doesn't recognize faces. I recommended low vision evaluation with Dr. Borges to see if we can assist in improving his vision daily.     Very unreliable VF's and floor effect on OCT RNFL, difficult to predict glaucoma progression in the future, closely monitor IOP and compliance to medications.     RTC in 3 months [facility], IOP   Refer to Dr. Borges       Physician Attestation     Attending Physician Attestation:  Complete documentation of historical and exam elements from today's encounter can be found in the full encounter summary report (not reduplicated in this progress note). I personally obtained the chief complaint(s) and history of present illness. I confirmed and edited as necessary the review of systems, past medical/surgical history, family history, social history, and examination findings as documented by others; and I examined the patient myself. I personally reviewed the relevant tests, images, and reports as documented above. I formulated and edited as necessary the assessment and plan and discussed the findings and management plan with the patient and any family members present at the time of the visit.  Reji Acharya M.D., Glaucoma, October 21, 2021

## 2021-10-21 NOTE — NURSING NOTE
Chief Complaints and History of Present Illnesses   Patient presents with     Glaucoma Follow Up     2 week follow up      Chief Complaint(s) and History of Present Illness(es)     Glaucoma Follow Up     Comments: 2 week follow up               Comments     Pt with son today as .  Pt states vision is the same as last visit. No eye pain today.  No flashes or floaters.    SHAUN Silva October 21, 2021 8:52 AM

## 2021-10-22 ENCOUNTER — LAB (OUTPATIENT)
Dept: LAB | Facility: CLINIC | Age: 71
End: 2021-10-22
Payer: COMMERCIAL

## 2021-10-22 ENCOUNTER — OFFICE VISIT (OUTPATIENT)
Dept: INTERNAL MEDICINE | Facility: CLINIC | Age: 71
End: 2021-10-22
Payer: COMMERCIAL

## 2021-10-22 ENCOUNTER — ANCILLARY PROCEDURE (OUTPATIENT)
Dept: GENERAL RADIOLOGY | Facility: CLINIC | Age: 71
End: 2021-10-22
Attending: PEDIATRICS
Payer: COMMERCIAL

## 2021-10-22 VITALS
WEIGHT: 134 LBS | BODY MASS INDEX: 17.68 KG/M2 | SYSTOLIC BLOOD PRESSURE: 163 MMHG | OXYGEN SATURATION: 97 % | DIASTOLIC BLOOD PRESSURE: 76 MMHG | HEART RATE: 70 BPM

## 2021-10-22 DIAGNOSIS — A15.0 PULMONARY TUBERCULOSIS: ICD-10-CM

## 2021-10-22 DIAGNOSIS — R05.9 COUGH: ICD-10-CM

## 2021-10-22 DIAGNOSIS — J47.9 BRONCHIECTASIS WITHOUT ACUTE EXACERBATION (H): ICD-10-CM

## 2021-10-22 DIAGNOSIS — R05.9 COUGH: Primary | ICD-10-CM

## 2021-10-22 LAB
ANION GAP SERPL CALCULATED.3IONS-SCNC: 5 MMOL/L (ref 3–14)
BASOPHILS # BLD AUTO: 0.1 10E3/UL (ref 0–0.2)
BASOPHILS NFR BLD AUTO: 1 %
BUN SERPL-MCNC: 20 MG/DL (ref 7–30)
CALCIUM SERPL-MCNC: 8.8 MG/DL (ref 8.5–10.1)
CHLORIDE BLD-SCNC: 105 MMOL/L (ref 94–109)
CO2 SERPL-SCNC: 28 MMOL/L (ref 20–32)
CREAT SERPL-MCNC: 0.9 MG/DL (ref 0.66–1.25)
EOSINOPHIL # BLD AUTO: 0.5 10E3/UL (ref 0–0.7)
EOSINOPHIL NFR BLD AUTO: 9 %
ERYTHROCYTE [DISTWIDTH] IN BLOOD BY AUTOMATED COUNT: 14 % (ref 10–15)
GFR SERPL CREATININE-BSD FRML MDRD: 86 ML/MIN/1.73M2
GLUCOSE BLD-MCNC: 96 MG/DL (ref 70–99)
HCT VFR BLD AUTO: 38.7 % (ref 40–53)
HGB BLD-MCNC: 12.2 G/DL (ref 13.3–17.7)
IMM GRANULOCYTES # BLD: 0 10E3/UL
IMM GRANULOCYTES NFR BLD: 0 %
LYMPHOCYTES # BLD AUTO: 2.6 10E3/UL (ref 0.8–5.3)
LYMPHOCYTES NFR BLD AUTO: 43 %
MAGNESIUM SERPL-MCNC: 1.7 MG/DL (ref 1.6–2.3)
MCH RBC QN AUTO: 28 PG (ref 26.5–33)
MCHC RBC AUTO-ENTMCNC: 31.5 G/DL (ref 31.5–36.5)
MCV RBC AUTO: 89 FL (ref 78–100)
MONOCYTES # BLD AUTO: 0.6 10E3/UL (ref 0–1.3)
MONOCYTES NFR BLD AUTO: 9 %
NEUTROPHILS # BLD AUTO: 2.4 10E3/UL (ref 1.6–8.3)
NEUTROPHILS NFR BLD AUTO: 38 %
NRBC # BLD AUTO: 0 10E3/UL
NRBC BLD AUTO-RTO: 0 /100
PLATELET # BLD AUTO: 177 10E3/UL (ref 150–450)
POTASSIUM BLD-SCNC: 3.9 MMOL/L (ref 3.4–5.3)
RBC # BLD AUTO: 4.35 10E6/UL (ref 4.4–5.9)
SODIUM SERPL-SCNC: 138 MMOL/L (ref 133–144)
WBC # BLD AUTO: 6.2 10E3/UL (ref 4–11)

## 2021-10-22 PROCEDURE — 71046 X-RAY EXAM CHEST 2 VIEWS: CPT | Mod: GC | Performed by: RADIOLOGY

## 2021-10-22 PROCEDURE — 83735 ASSAY OF MAGNESIUM: CPT | Performed by: PATHOLOGY

## 2021-10-22 PROCEDURE — 85025 COMPLETE CBC W/AUTO DIFF WBC: CPT | Performed by: PATHOLOGY

## 2021-10-22 PROCEDURE — 36415 COLL VENOUS BLD VENIPUNCTURE: CPT | Performed by: PATHOLOGY

## 2021-10-22 PROCEDURE — 80048 BASIC METABOLIC PNL TOTAL CA: CPT | Performed by: PATHOLOGY

## 2021-10-22 PROCEDURE — 99215 OFFICE O/P EST HI 40 MIN: CPT | Performed by: PEDIATRICS

## 2021-10-22 NOTE — NURSING NOTE
Chief Complaint   Patient presents with     Forms     pt here to complete medical disability form       Chun Delatorre CMA, EMT at 2:48 PM on 10/22/2021.

## 2021-10-22 NOTE — PROGRESS NOTES
"Dear patient. Thank you for visiting with me. I want you to feel respected, understood, and empowered. \"Respect\" is valuing you as much as I would a close family member. \"Empowerment\" happens when you are fully informed, and can make the best possible decision for you.  Please ask me questions!  Challenge anything that is not clear.    Medical records are primarily used as memory aids for me and my colleagues. Things to know about my documentation style:  - The 'problem list' includes current symptoms or diagnoses, and some problems that are resolved but may return. I use the past medical history for problems not expected to return.  - I use single quotation marks for things that you or I said, when I want to clarify who was speaking.  - I use double quotation marks when copying a term from elsewhere in your records. Italics (besides here) may also denote a quotation.  If you have questions or concerns, please contact me; I will reply as soon as time allows.    Context    Shayne Fiore is a 71 year old man, here with his on, with concerns including:  Chief Complaint   Patient presents with     Forms     pt here to complete medical disability form     PCP: Isai Caruso   Visit type: problem-oriented    BP (!) 163/76 (BP Location: Right arm, Patient Position: Sitting, Cuff Size: Adult Regular)   Pulse 70   Wt 60.8 kg (134 lb)   SpO2 97%   BMI 17.68 kg/m      Problems and progress      I was given a form to complete for immigration, to allow him to stay in the country with his relatives, excusing him from English fluency or civics responsibility because of cognitive disability.  Based on my earlier video meeting, I have been assuming he has Alzheimer's dementia, but the government form required more specificity than I could give about diagnosis.     I and the son worked on the form together, and asked questions of Mr. Fiore as needed.                    Cough  At the last moment, son mentioned " the patient's continued cough, without fever, and despite TB treatment. Lungs were clear with mildly decreased air movement. I ordered a CXR. Given the history, I placed a request for him to see pulmonary.              Other comments      Other physical exam  Physical Exam  Constitutional:       Appearance: Normal appearance.   HENT:      Head: Normocephalic.      Right Ear: External ear normal.      Left Ear: External ear normal.      Nose: Nose normal.   Eyes:      General: No scleral icterus.        Right eye: No discharge.         Left eye: No discharge.      Extraocular Movements: Extraocular movements intact.   Cardiovascular:      Rate and Rhythm: Normal rate and regular rhythm.   Pulmonary:      Effort: Pulmonary effort is normal. No respiratory distress.      Breath sounds: Normal breath sounds. No stridor. No wheezing or rhonchi.   Skin:     Findings: No rash.   Neurological:      Mental Status: He is alert.   Psychiatric:         Mood and Affect: Mood normal.         Behavior: Behavior normal.         Thought Content: Thought content normal.         Judgment: Judgment normal.            About this visit:  Time note (e5, 40'): The total time (on the date of service) for this service was 43 minutes, including discussion/face-to-face, chart review, interpretation not otherwise reported, documentation, and updating of the computerized record.

## 2021-10-25 ENCOUNTER — APPOINTMENT (OUTPATIENT)
Dept: INTERPRETER SERVICES | Facility: CLINIC | Age: 71
End: 2021-10-25
Payer: COMMERCIAL

## 2021-10-27 PROBLEM — K21.9 GASTROESOPHAGEAL REFLUX DISEASE: Status: ACTIVE | Noted: 2019-03-25

## 2021-10-27 PROBLEM — J47.9 BRONCHIECTASIS WITHOUT ACUTE EXACERBATION (H): Status: ACTIVE | Noted: 2021-07-17

## 2021-10-27 PROBLEM — A15.0 PULMONARY TUBERCULOSIS: Status: ACTIVE | Noted: 2021-01-29

## 2021-10-27 NOTE — ASSESSMENT & PLAN NOTE
Cough  At the last moment, son mentioned the patient's continued cough, without fever, and despite TB treatment. Lungs were clear with mildly decreased air movement. I ordered a CXR. Given the history, I placed a request for him to see pulmonary.

## 2021-11-04 ENCOUNTER — TELEPHONE (OUTPATIENT)
Dept: INTERNAL MEDICINE | Facility: CLINIC | Age: 71
End: 2021-11-04
Payer: COMMERCIAL

## 2021-11-04 DIAGNOSIS — Z91.199 FAILURE TO ATTEND APPOINTMENT: Primary | ICD-10-CM

## 2021-11-10 ENCOUNTER — TELEPHONE (OUTPATIENT)
Dept: INTERNAL MEDICINE | Facility: CLINIC | Age: 71
End: 2021-11-10
Payer: COMMERCIAL

## 2021-11-10 NOTE — TELEPHONE ENCOUNTER
[facility] Call Center    Phone Message    May a detailed message be left on voicemail: yes     Reason for Call: Other: Pt's son requesting call to discuss getting a medical form called a M648 filled out for disability exceptions for the pt      Action Taken: Message routed to:  [facility] ([facility]): azalia

## 2021-11-11 NOTE — CONFIDENTIAL NOTE
Left a message for Malka, patients son to make an appointment with  for his fathers M648 form to be completed.Malinda Jones on 11/11/2021 at 8:40 AM

## 2021-11-12 NOTE — CONFIDENTIAL NOTE
Spoke to Malka and he said his dad had an appointment on 10/22/21 with Dr. Tavares and does not think it needs another appt.

## 2021-11-17 NOTE — TELEPHONE ENCOUNTER
Pt's son requesting call back to discuss the M648 form, he stated he was told it was going to be mailed to the pt but they have not received it. Pt's son expressed extreme frustration that they have been trying to get this form for over 2 months now

## 2021-11-18 NOTE — CONFIDENTIAL NOTE
Left message for Malka that his father's M648 is complete and being mailed to address on file today, 11/18/21.    Malinda Jones on 11/18/2021 at 8:52 AM

## 2022-01-29 ENCOUNTER — HEALTH MAINTENANCE LETTER (OUTPATIENT)
Age: 72
End: 2022-01-29

## 2022-09-11 ENCOUNTER — HEALTH MAINTENANCE LETTER (OUTPATIENT)
Age: 72
End: 2022-09-11

## 2022-11-28 DIAGNOSIS — H40.1133 PRIMARY OPEN ANGLE GLAUCOMA (POAG) OF BOTH EYES, SEVERE STAGE: ICD-10-CM

## 2023-02-26 RX ORDER — LATANOPROST 50 UG/ML
1 SOLUTION/ DROPS OPHTHALMIC AT BEDTIME
Qty: 7.5 ML | Refills: 0 | OUTPATIENT
Start: 2023-02-26

## 2023-05-06 ENCOUNTER — HEALTH MAINTENANCE LETTER (OUTPATIENT)
Age: 73
End: 2023-05-06

## 2023-05-30 ENCOUNTER — OFFICE VISIT (OUTPATIENT)
Dept: INTERNAL MEDICINE | Facility: CLINIC | Age: 73
End: 2023-05-30
Payer: COMMERCIAL

## 2023-05-30 VITALS
BODY MASS INDEX: 18.87 KG/M2 | DIASTOLIC BLOOD PRESSURE: 65 MMHG | WEIGHT: 142.4 LBS | SYSTOLIC BLOOD PRESSURE: 118 MMHG | HEART RATE: 67 BPM | HEIGHT: 73 IN | TEMPERATURE: 98.2 F | OXYGEN SATURATION: 96 %

## 2023-05-30 DIAGNOSIS — H65.01 NON-RECURRENT ACUTE SEROUS OTITIS MEDIA OF RIGHT EAR: Primary | ICD-10-CM

## 2023-05-30 DIAGNOSIS — H40.002 GLAUCOMA SUSPECT, LEFT: ICD-10-CM

## 2023-05-30 PROCEDURE — 99214 OFFICE O/P EST MOD 30 MIN: CPT | Performed by: NURSE PRACTITIONER

## 2023-05-30 NOTE — PROGRESS NOTES
Mr. Fiore is a 73 year old male with history of GERD, TIA, prostate cancer, previously treated TB, chronic bronchiectasis w/ hx of pseudomonas, lung cavitary lesion (LLL posteriorly) with sepsis in September 2022 who presents for right ear drainage.  He is accompanied by his son who is serving as .    He is a patient of Dr. Caruso.      History of Present Illness:    Ear Drainage:  He presents today specifically because for the past month he reports yellow drainage from that right ear. Initially reports there was pain for a few days but has since resolved. Denies fever, chills, or recent illnesses. Reports he is [place], was given hearing aid for the L ear but does not use it as it was not helpful.     Reports perforated right ear drum 10 years ago. States he saw ENT for perforation who recommended surgery at that time but the patient declined due to side effects such as facial paralysis. On chart review, it appears he was last seen 2/23/17 by ENT where he was scheduled for a cartilage backed tympanoplasty but did not have this completed.    Ophthalmology:  Requesting refill of eye drops.  He takes several drops for glaucoma and is unable to specify which drops need refilling. No record of ophthalmology evaluation since 2021.       Review of external notes as documented above       Past Medical History:  Past Medical History:   Diagnosis Date     Prostate cancer (H) 02/2017     Pulmonary tuberculosis 1/29/2021       Active Meds:  Current Outpatient Medications   Medication     acetaminophen (TYLENOL) 325 MG tablet     ASPIRIN PO     atropine 1 % ophthalmic solution     brimonidine (ALPHAGAN) 0.2 % ophthalmic solution     brimonidine-timolol (COMBIGAN) 0.2-0.5 % ophthalmic solution     colchicine (COLCYRS) 0.6 MG tablet     dorzolamide-timolol (COSOPT) 2-0.5 % ophthalmic solution     erythromycin (ROMYCIN) 5 MG/GM ophthalmic ointment     erythromycin (ROMYCIN) ophthalmic ointment     ethambutol (MYAMBUTOL)  "400 MG tablet     Hypromellose (ARTIFICIAL TEARS OP)     isoniazid (NYDRAZID) 300 MG tablet     latanoprost (XALATAN) 0.005 % ophthalmic solution     neomycin-bacitracin-polymyxin (NEOSPORIN) 5-400-5000 ointment     prednisolone 0.25% and hyaluronate in balanced salt SUSP compounded ophthalmic suspension     pyrazinamide 500 MG tablet     rifampin (RIFADIN) 300 MG capsule     sildenafil (VIAGRA) 100 MG tablet     tadalafil (CIALIS) 5 MG tablet     timolol (TIMOPTIC) 0.5 % ophthalmic solution     tiotropium (SPIRIVA RESPIMAT) 2.5 MCG/ACT inhaler     VITAMIN  B-6 25 MG tablet     No current facility-administered medications for this visit.        Allergies:  Reviewed, refer to EMR    Family History:  I have reviewed this patient's family history and updated it with pertinent information if needed.  Family History   Problem Relation Age of Onset     Glaucoma Sister      Glaucoma Cousin      Macular Degeneration No family hx of          A full 10-pt Review of Systems was performed, verified and is negative except as documented in the HPI.  All health questionnaires were reviewed, verified and relevant information documented above.      Physical Exam:  Vitals: /65 (BP Location: Right arm, Patient Position: Sitting, Cuff Size: Adult Regular)   Pulse 67   Temp 98.2  F (36.8  C) (Oral)   Ht 1.854 m (6' 1\")   Wt 64.6 kg (142 lb 6.4 oz)   SpO2 96%   BMI 18.79 kg/m    Constitutional: Alert, oriented, pleasant, no acute distress  Head: Normocephalic, atraumatic  Eyes: Extra-ocular movements intact, no scleral icterus  ENT: Oropharynx clear, moist mucus membranes, right ear with serous drainage and bulging tympanic membrane, pinpoint blistering  Cardiovascular: Regular rate and rhythm, no murmurs, rubs or gallops, peripheral pulses full/symmetric  Respiratory: Good air movement bilaterally, lungs clear, no wheezes/rales/rhonchi  GI: Abdomen soft, bowel sounds present, nondistended, nontender, no organomegaly or " masses, no rebound/guarding  Musculoskeletal: Trace edema BLE, normal muscle tone, slow and careful gait with cane or 1-person assist  Neurologic: Alert and oriented, cranial nerves 2-12 intact, strength 5/5 throughout  Skin: No rashes/lesions  Psychiatric: normal mentation, affect and mood      Assessment and Plan:    73 year old male with history of GERD, TIA, prostate cancer, previously treated TB, chronic bronchiectasis w/ hx of pseudomonas, lung cavitary lesion (LLL posteriorly) with sepsis in September 2022 who presents for right ear drainage.    (H65.01) Non-recurrent acute serous otitis media of right ear  (primary encounter diagnosis)  Comment: Complete antibiotic course. Incorporate yogurt or kefir for healthy probiotics while taking antibiotics.   Plan: amoxicillin-clavulanate (AUGMENTIN) 875-125 MG tablet,   May use tylenol for discomfort, up to 3000 mg/day    (H40.002)  Glaucoma  Plan: Adult Eye  Referral       #Routine Health Maintenence:  Immunizations (zoster, pneumovax, flu, Tdap, Hep A/B):   Most Recent Immunizations   Administered Date(s) Administered     FLU 6-35 months 12/15/2016     HepB, Unspecified 06/19/2015     Hepatitis B, Adult 06/19/2015     Influenza (High Dose) 3 valent vaccine 12/12/2017     OPV, trivalent, live 07/14/2014     Pneumo Conj 13-V (2010&after) 03/25/2019     Pneumococcal 23 valent 12/15/2016     Poliovirus, inactivated (IPV) 07/14/2014     TD,PF 7+ (Tenivac) 05/19/2016     Td (Adult), Adsorbed 05/19/2016     Tdap (Adult) Unspecified Formulation 12/15/2016     Lipids: No results for input(s): CHOL, HDL, LDL, TRIG, CHOLHDLRATIO in the last 48611 hours.  PSA (50-75 yrs):   PSA   Date Value Ref Range Status   12/13/2016 18.87 (H) 0 - 4 ug/L Final     Comment:     Assay Method:  Chemiluminescence using Siemens Vista analyzer        Return to clinic:  As needed and with no improvement, worsening, or new symptom development.     Options for treatment and follow-up care  were reviewed with the patient. He engaged in the decision making process and verbalized understanding of the options discussed and agreed with the final plan.    I spent a total of 30 minutes in the care of this pt today, including time prior to, during, and following today's office visit. This time includes reviewing the patient's chart and prior history, external notes, obtaining a history, performing an examination, interpreting test results, and evaluation and counseling the patient. This time also includes ordering medications or tests necessary in addition to communication to other member's of the patient's health care team. Time spent in documentation and care coordination is included.    KALPESH Peñaloza Student      I was present with the NPP student who participated in the service and in the documentation of the services provided.  I have verified the history and personally performed the physical exam and medical decision making, as documented by the student and edited by me.      Tangela Zavaleta, ANP, AGAP  Nurse Practitioner

## 2023-05-30 NOTE — PATIENT INSTRUCTIONS
Tylenol for discomfort, not more than 3000 mg per day    Probiotic for antibiotic use:  Yogurt with live cultures or Kefir    Augmentin:  Take the antibiotic twice each day for 10 days

## 2023-05-30 NOTE — NURSING NOTE
"Shayne Fiore is a 73 year old male patient that presents today in clinic for the following:    Chief Complaint   Patient presents with     Ear Problem     Ear drainage.     The patient's allergies and medications were reviewed as noted. A set of vitals were recorded as noted without incident: /65 (BP Location: Right arm, Patient Position: Sitting, Cuff Size: Adult Regular)   Pulse 67   Temp 98.2  F (36.8  C) (Oral)   Ht 1.854 m (6' 1\")   Wt 64.6 kg (142 lb 6.4 oz)   SpO2 96%   BMI 18.79 kg/m  . The patient does not have any other questions for the provider.    Danielle Griffin, EMT at 4:11 PM on 5/30/2023.  Primary care clinic: [phone]  "

## 2023-06-02 NOTE — NURSING NOTE
Chief Complaints and History of Present Illnesses   Patient presents with     Follow Up For     4 month follow up s/p IOL explantation/ant vit/ACIOL right eye (2/15/17)     HPI    Affected eye(s):  Right   Symptoms:     No floaters   No flashes   No redness   No Dryness   No itching         Do you have eye pain now?:  No      Comments:  Pt states vision is the same as last visit. Pt notes eye pain in LE that comes and goes ( for many years), but nothing new. Pt still having redness in both eyes, no changes.  Pt took Cosopt BID in LE, but stopped drop about 1 month ago. Pt notes no changes in eye pain LE while on Cosopt.    Tan DUNN February 13, 2018 2:22 PM                warm compresses  With lid massage and hygiene  Acute conj ointment morning and night  Recheck 24 hrs if worse  May need oral antibiotics

## 2023-06-19 ENCOUNTER — OFFICE VISIT (OUTPATIENT)
Dept: OPHTHALMOLOGY | Facility: CLINIC | Age: 73
End: 2023-06-19
Attending: STUDENT IN AN ORGANIZED HEALTH CARE EDUCATION/TRAINING PROGRAM
Payer: COMMERCIAL

## 2023-06-19 DIAGNOSIS — H40.1133 PRIMARY OPEN ANGLE GLAUCOMA (POAG) OF BOTH EYES, SEVERE STAGE: ICD-10-CM

## 2023-06-19 PROCEDURE — G0463 HOSPITAL OUTPT CLINIC VISIT: HCPCS | Performed by: STUDENT IN AN ORGANIZED HEALTH CARE EDUCATION/TRAINING PROGRAM

## 2023-06-19 PROCEDURE — 92014 COMPRE OPH EXAM EST PT 1/>: CPT | Mod: GC | Performed by: STUDENT IN AN ORGANIZED HEALTH CARE EDUCATION/TRAINING PROGRAM

## 2023-06-19 RX ORDER — LATANOPROST 50 UG/ML
1 SOLUTION/ DROPS OPHTHALMIC AT BEDTIME
Qty: 7.5 ML | Refills: 11 | Status: SHIPPED | OUTPATIENT
Start: 2023-06-19

## 2023-06-19 ASSESSMENT — VISUAL ACUITY
METHOD: SNELLEN - LINEAR
OS_SC: NLP
OD_SC+: -1
CORRECTION_TYPE: GLASSES
OD_SC: 20/125

## 2023-06-19 ASSESSMENT — TONOMETRY
OS_IOP_MMHG: 50
IOP_METHOD: APPLANATION
OD_IOP_MMHG: 11

## 2023-06-19 ASSESSMENT — REFRACTION_WEARINGRX
SPECS_TYPE: BIFOCAL
OD_CYLINDER: +8.00
OD_SPHERE: -6.50
OS_SPHERE: BALANCE
OD_AXIS: 100
OD_ADD: +3.00

## 2023-06-19 ASSESSMENT — PACHYMETRY
EXAM_DATE: 12/13/2016
OD_CT(UM): 456
OS_CT(UM): 480

## 2023-06-19 ASSESSMENT — CONF VISUAL FIELD
OS_INFERIOR_NASAL_RESTRICTION: 1
OD_SUPERIOR_NASAL_RESTRICTION: 0
OD_INFERIOR_TEMPORAL_RESTRICTION: 0
METHOD: COUNTING FINGERS
OS_SUPERIOR_NASAL_RESTRICTION: 1
OD_INFERIOR_NASAL_RESTRICTION: 0
OS_INFERIOR_TEMPORAL_RESTRICTION: 1
OD_SUPERIOR_TEMPORAL_RESTRICTION: 0
OD_NORMAL: 1
OS_SUPERIOR_TEMPORAL_RESTRICTION: 1

## 2023-06-19 ASSESSMENT — CUP TO DISC RATIO: OD_RATIO: 0.9

## 2023-06-19 ASSESSMENT — SLIT LAMP EXAM - LIDS
COMMENTS: NORMAL
COMMENTS: NORMAL

## 2023-06-19 ASSESSMENT — EXTERNAL EXAM - RIGHT EYE: OD_EXAM: NORMAL

## 2023-06-19 ASSESSMENT — EXTERNAL EXAM - LEFT EYE: OS_EXAM: NORMAL

## 2023-06-19 NOTE — NURSING NOTE
Chief Complaints and History of Present Illnesses   Patient presents with     Glaucoma     New patient glaucoma evaluation.      Chief Complaint(s) and History of Present Illness(es)     Glaucoma            Comments: New patient glaucoma evaluation.          Comments    Eye meds: artificial tears     Son is with today, Malka, and helps with interpretation.  Both eyes pain.  Feels like sand inside the eye.    LEIGHA Gaming 6/19/2023 10:09 AM

## 2023-06-19 NOTE — PROGRESS NOTES
Chief Complaint/Presenting Concern: Here for glaucoma follow up    History of Present Illness:   Shayne Fiore is a 73 year old patient who presents for follow up of glaucoma. Has run out of eye drops, would like refill. Son helps with eye drops. Here with his son who is facilitating the visit with interpretation today. Ran out of latanoprost 2 months ago. Has not used since.     Relevant Past Medical/Family/Social History: BPH, Prostate cancer, Anemia     Relevant Review of Systems: None Relevant       Diagnosis: Primary open angle glaucoma  OU   Originally from [facility], etiology of vision loss left eye   Previous glaucoma surgery/laser:  - s/p Trab right eye   Maximum intraocular pressure 50 mmHg left eye   Currently Meds: stopped using the latanoprost   CCT: 456/480  Trauma history: negative  Steroid exposure: negative  Vasospastic disease: Migrane/Raynaud phenomenon: negative  A past hemodynamic crisis or Low BP:: negative  Meds AEs/intolerance: None   OCT Optic Nerve RNFL Spectralis June 8, 2021  Right eye: diffuse RNFL thinning, worse superiorly   Visual field 10/05/21  Right eye - central island more constricted compared to prior VF, very unreliable high FP, FN     Today's testing:  IOP 11/50 mmHg     Additional Ocular History:    2, Hx of Blind painful eye, left eye   -B scan (06/2020) with hyperechoic debri, favor PVD; vitritis less likely.  -No pain or headache currently, despite elevated IOP.  -Patient has lost vision completely in the left eye due to glaucoma.    3. Pterygium, left eye    4. Pterygium right eye  -S/P removal.     5. Tino nodule, right eye   -Notable punctate epitheliopathy, and irregular surface, no epi defect     6. Brunescent Cataract left eye     7. CARINE each eye     8. ACIOL right eye  - 02/15/17 with Dr. Varma. H/O subluxed IOL right eye.    9. Sensory XT, left eye   - blind eye    Plan/Recommendations:    Discussed findings with patient.    IOP on target today right eye  despite not using latanoprost, would aim for low IOP given advanced glaucoma damage and monocular status. Good response to latanoprost.     Continue Latanoprost at bedtime OU     Use preservative free artificial tears QID and prn OU    Discussed with patient regarding alternative options for management of blind painful eye for left eye, including enucleation; patient would prefer to continue same for now.     Son reports that although Mr. Bella can see 20/125 on the eye chart, he does not see people well and doesn't recognize faces. I recommended low vision evaluation with Dr. Borges to see if we can assist in improving his vision daily; he did not end up following up with Dr Borges in the past    Per Dr Acharya's prior note, very unreliable VF's and floor effect on OCT RNFL, difficult to predict glaucoma progression in the future, closely monitor IOP and compliance to medications    Follow up with Dr Acharya next available; patient to re-establish care    Jeanna James MD  Ophthalmology Resident PGY4  [facility]     Counseled return/RD precautions    Attending Physician Attestation:  Complete documentation of historical and exam elements from today's encounter can be found in the full encounter summary report (not reduplicated in this progress note).  I personally obtained the chief complaint(s) and history of present illness.  I confirmed and edited as necessary the review of systems, past medical/surgical history, family history, social history, and examination findings as documented by others; and I examined the patient myself.  I personally reviewed the relevant tests, images, and reports as documented above.  I formulated and edited as necessary the assessment and plan and discussed the findings and management plan with the patient and family. . - Michelle Li MD

## 2023-08-10 ENCOUNTER — OFFICE VISIT (OUTPATIENT)
Dept: OPHTHALMOLOGY | Facility: CLINIC | Age: 73
End: 2023-08-10
Attending: OPHTHALMOLOGY
Payer: COMMERCIAL

## 2023-08-10 DIAGNOSIS — H40.1133 PRIMARY OPEN ANGLE GLAUCOMA (POAG) OF BOTH EYES, SEVERE STAGE: ICD-10-CM

## 2023-08-10 PROCEDURE — G0463 HOSPITAL OUTPT CLINIC VISIT: HCPCS | Performed by: OPHTHALMOLOGY

## 2023-08-10 PROCEDURE — 92012 INTRM OPH EXAM EST PATIENT: CPT | Performed by: OPHTHALMOLOGY

## 2023-08-10 ASSESSMENT — TONOMETRY
IOP_METHOD: TONOPEN
OD_IOP_MMHG: 13
OD_IOP_MMHG: 12
OD_IOP_MMHG: 10
IOP_METHOD: TONOPEN
IOP_METHOD: TONOPEN
OD_IOP_MMHG: 12
IOP_METHOD: TONOPEN
OS_IOP_MMHG: 53

## 2023-08-10 ASSESSMENT — CONF VISUAL FIELD
OS_SUPERIOR_NASAL_RESTRICTION: 1
OS_SUPERIOR_TEMPORAL_RESTRICTION: 1
OD_SUPERIOR_NASAL_RESTRICTION: 0
OS_INFERIOR_TEMPORAL_RESTRICTION: 1
OS_INFERIOR_NASAL_RESTRICTION: 1
METHOD: COUNTING FINGERS

## 2023-08-10 ASSESSMENT — SLIT LAMP EXAM - LIDS
COMMENTS: NORMAL
COMMENTS: NORMAL

## 2023-08-10 ASSESSMENT — REFRACTION_WEARINGRX
OD_AXIS: 100
OD_ADD: +3.00
OS_SPHERE: BALANCE
OD_CYLINDER: +8.00
OD_SPHERE: -6.50
SPECS_TYPE: BIFOCAL

## 2023-08-10 ASSESSMENT — VISUAL ACUITY
METHOD: SNELLEN - LINEAR
OD_CC: 20/100
CORRECTION_TYPE: GLASSES
OS_CC: NLP

## 2023-08-10 ASSESSMENT — EXTERNAL EXAM - LEFT EYE: OS_EXAM: NORMAL

## 2023-08-10 ASSESSMENT — CUP TO DISC RATIO: OD_RATIO: 0.9

## 2023-08-10 ASSESSMENT — EXTERNAL EXAM - RIGHT EYE: OD_EXAM: NORMAL

## 2023-08-10 NOTE — PROGRESS NOTES
Patient has lost vision completely in the left eye due to glaucoma. He was examined by Dr. Villalobos and started on latanoprost at bedtime each eye, patient took the medication and then ran out of the drop and did not get any refills afterwards.    Chief Complaint/Presenting Concern: Here for glaucoma follow up    History of Present Illness:   Shayne Fiore is a 71 year old patient who presents for follow up of glaucoma. Here to re-establish care as he has not been seen in glaucoma clinic since 10/2021.     08/10/23: Patient lost to follow up since 2021. Was seen in general clinic by Dr. Li who referred him back here. Here with son today who is translating. His eyes are feeling well, no pain. Using latanoprost at bedtime both eyes.     Relevant Past Medical/Family/Social History: BPH, Prostate cancer, Anemia     Relevant Review of Systems: None Relevant       Diagnosis: Primary open angle glaucoma each eye   Originally from [facility], etiology of vision loss left eye   Previous glaucoma surgery/laser:  - s/p Trab right eye   Maximum intraocular pressure 45 mmHg left eye   Currently Meds: stopped using the latanoprost   CCT: 456/480  Trauma history: negative  Steroid exposure: negative  Vasospastic disease: Migrane/Raynaud phenomenon: negative  A past hemodynamic crisis or Low BP:: negative  Meds AEs/intolerance: None   OCT Optic Nerve RNFL Spectralis June 8, 2021  Right eye: diffuse RNFL thinning, worse superiorly   Visual field 10/05/21  Right eye - central island more constricted compared to prior VF, very unreliable high FP, FN     Today's testing:  IOP 12/45 mmHg with tonopen. Unable to applanate    Additional Ocular History:    2, Blind painful eye, left eye   -B scan (06/2020) with hyperechoic debri, favor PVD; vitritis less likely.  -No pain or headache currently, despite elevated IOP.     3. Pterygium, left eye    4. Pterygium right eye  -S/P removal.     5. Tino nodule, right eye   -Notable punctate  epitheliopathy, and irregular surface, no epi defect     6. Brunescent Cataract left eye     7. CARINE each eye     8. ACIOL right eye  - 02/15/17 with Dr. Varma. H/O subluxed IOL right eye.     Plan/Recommendations:    Discussed findings with patient.    IOP on target today right eye, would aim for low IOP given advanced glaucoma damage and monocular status. Good response to latanoprost.     Discussed with patient regarding alternative options for management of blind painful eye for left eye, including enucleation; patient would prefer to continue same for now. Not having significant pain.    Very unreliable VF's and floor effect on OCT RNFL, difficult to predict glaucoma progression in the future, closely monitor IOP and compliance to medications. Will re-attempt VF at next visit.    Continue Latanoprost at bedtime each eye     RTC in 3 months [facility], IOP, VF    Holley Jackson MD  Resident Physician, PGY-3  Department of Ophthalmology        Physician Attestation     Attending Physician Attestation:  Complete documentation of historical and exam elements from today's encounter can be found in the full encounter summary report (not reduplicated in this progress note). I personally obtained the chief complaint(s) and history of present illness. I confirmed and edited as necessary the review of systems, past medical/surgical history, family history, social history, and examination findings as documented by others; and I examined the patient myself. I personally reviewed the relevant tests, images, and reports as documented above. I formulated and edited as necessary the assessment and plan and discussed the findings and management plan with the patient and any family members present at the time of the visit.  Reji Acharya M.D., Glaucoma, August 10, 2023

## 2023-08-10 NOTE — NURSING NOTE
"Chief Complaints and History of Present Illnesses   Patient presents with    Follow Up     Primary open angle glaucoma each eye      Chief Complaint(s) and History of Present Illness(es)       Follow Up              Laterality: both eyes    Comments: Primary open angle glaucoma each eye               Comments    Son is interpreting for this visit.   \"Sometimes the pressure goes up and feels pain\"  No eye pain today.   Pt is using Latanaprost and AT PRN    [facility] 1:57 PM August 10, 2023                    "

## 2023-09-18 ENCOUNTER — TELEPHONE (OUTPATIENT)
Dept: OPHTHALMOLOGY | Facility: CLINIC | Age: 73
End: 2023-09-18
Payer: COMMERCIAL

## 2023-09-22 ENCOUNTER — APPOINTMENT (OUTPATIENT)
Dept: INTERPRETER SERVICES | Facility: CLINIC | Age: 73
End: 2023-09-22
Payer: COMMERCIAL

## 2023-12-19 ENCOUNTER — TELEPHONE (OUTPATIENT)
Dept: OPHTHALMOLOGY | Facility: CLINIC | Age: 73
End: 2023-12-19
Payer: COMMERCIAL

## 2023-12-28 DIAGNOSIS — H40.1133 PRIMARY OPEN ANGLE GLAUCOMA (POAG) OF BOTH EYES, SEVERE STAGE: Primary | ICD-10-CM

## 2024-07-13 ENCOUNTER — HEALTH MAINTENANCE LETTER (OUTPATIENT)
Age: 74
End: 2024-07-13

## 2025-07-19 ENCOUNTER — HEALTH MAINTENANCE LETTER (OUTPATIENT)
Age: 75
End: 2025-07-19

## (undated) DEVICE — DAVINCI OBTURATOR 8MM BLADELESS 420023

## (undated) DEVICE — DAVINCI DRAPE KIT 4-ARM 420291

## (undated) DEVICE — SPONGE RAY-TEC 4X8" 7318

## (undated) DEVICE — Device

## (undated) DEVICE — SOL NACL 0.9% INJ 1000ML BAG 2B1324X

## (undated) DEVICE — DAVINCI S FCP BIPOLAR FENESTRATED 420205

## (undated) DEVICE — ENDO TROCAR FIRST ENTRY KII FIOS Z-THRD 12X100MM CTF73

## (undated) DEVICE — LINEN TOWEL PACK X5 5464

## (undated) DEVICE — BLADE CLIPPER 4406

## (undated) DEVICE — SU SILK 2-0 FSL 18" 677G

## (undated) DEVICE — SURGICEL HEMOSTAT 3X4" NUKNIT 1943

## (undated) DEVICE — DAVINCI S GRASPER ENDOWRIST PROGRASP 420093

## (undated) DEVICE — TUBING CONMED AIRSEAL SMOKE EVAC INSUFFLATION ASM-EVAC

## (undated) DEVICE — ENDO SHEARS EXTRA LONG 5MM 174601

## (undated) DEVICE — SU WND CLOSURE VLOC 180 ABS 2-0 9" GS-21 VLOCL0345

## (undated) DEVICE — DAVINCI HOT SHEARS TIP COVER  400180

## (undated) DEVICE — DAVINCI S NDL DRIVER LARGE 420006

## (undated) DEVICE — CATH FOLEY 20FR 5ML SILVER COAT LATEX 0165SI20

## (undated) DEVICE — CLIP ENDO HEMO-LOC PURPLE LG 544240

## (undated) DEVICE — DRAIN JACKSON PRATT RESERVOIR 100ML SU130-1305

## (undated) DEVICE — SU WND CLOSURE V-LOC 3-0 CV-23 6" VLOCM1904

## (undated) DEVICE — ENDO DISSECTOR BLUNT 05MM 3/PK 173019

## (undated) DEVICE — CATH HOLDER STRAP 36600

## (undated) DEVICE — NDL INSUFFLATION 120MM VERRES 172015

## (undated) DEVICE — TROCAR AIRSEAL BLADELESS 12X120MM IAS12-120

## (undated) DEVICE — PACK DAVINCI UROLOGY SBA15UDFSG

## (undated) DEVICE — SOL NACL 0.9% IRRIG 1000ML BOTTLE 07138-09

## (undated) DEVICE — ESU CORD MONOPOLAR 10'  E0510

## (undated) DEVICE — DRAIN JACKSON PRATT 15FR ROUND SU130-1323

## (undated) DEVICE — DAVINCI S ESU FCP BIPOLAR MARYLAND 420172

## (undated) DEVICE — SU WND CLOSURE VLOC 90 ABS 3-0 VIOLET 6" CV-23 VLOCM0804

## (undated) DEVICE — SUCTION CANISTER MEDIVAC LINER 3000ML W/LID 65651-530

## (undated) DEVICE — WIPES FOLEY CARE SURESTEP PROVON DFC100

## (undated) DEVICE — SUCTION IRR TRUMPET VALVE LAP ASU1201

## (undated) DEVICE — EVAC SYSTEM CLEAR FLOW SC082500

## (undated) DEVICE — DAVINCI S CANNULA SEAL 8.5-13MM 420206

## (undated) DEVICE — ESU CORD BIPOLAR 12' E0509

## (undated) DEVICE — DAVINCI S MONOPOLAR SCISSORS PRECURVED HOT SHEARS 420179

## (undated) DEVICE — KIT PATIENT POSITIONING PIGAZZI LATEX FREE 40580

## (undated) DEVICE — ESU GROUND PAD UNIVERSAL W/O CORD

## (undated) RX ORDER — BUPIVACAINE HYDROCHLORIDE 2.5 MG/ML
INJECTION, SOLUTION EPIDURAL; INFILTRATION; INTRACAUDAL
Status: DISPENSED
Start: 2017-07-13

## (undated) RX ORDER — DEXAMETHASONE SODIUM PHOSPHATE 4 MG/ML
INJECTION, SOLUTION INTRA-ARTICULAR; INTRALESIONAL; INTRAMUSCULAR; INTRAVENOUS; SOFT TISSUE
Status: DISPENSED
Start: 2017-07-13

## (undated) RX ORDER — NALOXONE HYDROCHLORIDE 0.4 MG/ML
INJECTION, SOLUTION INTRAMUSCULAR; INTRAVENOUS; SUBCUTANEOUS
Status: DISPENSED
Start: 2017-07-13

## (undated) RX ORDER — PROPOFOL 10 MG/ML
INJECTION, EMULSION INTRAVENOUS
Status: DISPENSED
Start: 2017-07-13

## (undated) RX ORDER — FENTANYL CITRATE 50 UG/ML
INJECTION, SOLUTION INTRAMUSCULAR; INTRAVENOUS
Status: DISPENSED
Start: 2017-07-13

## (undated) RX ORDER — BALANCED SALT SOLUTION 6.4; .75; .48; .3; 3.9; 1.7 MG/ML; MG/ML; MG/ML; MG/ML; MG/ML; MG/ML
SOLUTION OPHTHALMIC
Status: DISPENSED
Start: 2017-02-14

## (undated) RX ORDER — ONDANSETRON 2 MG/ML
INJECTION INTRAMUSCULAR; INTRAVENOUS
Status: DISPENSED
Start: 2017-07-13

## (undated) RX ORDER — CEFAZOLIN SODIUM 2 G/100ML
INJECTION, SOLUTION INTRAVENOUS
Status: DISPENSED
Start: 2017-07-13

## (undated) RX ORDER — LIDOCAINE HYDROCHLORIDE 20 MG/ML
INJECTION, SOLUTION EPIDURAL; INFILTRATION; INTRACAUDAL; PERINEURAL
Status: DISPENSED
Start: 2017-07-13

## (undated) RX ORDER — GENTAMICIN 40 MG/ML
INJECTION, SOLUTION INTRAMUSCULAR; INTRAVENOUS
Status: DISPENSED
Start: 2017-02-24

## (undated) RX ORDER — CEFAZOLIN SODIUM 1 G/3ML
INJECTION, POWDER, FOR SOLUTION INTRAMUSCULAR; INTRAVENOUS
Status: DISPENSED
Start: 2017-07-13

## (undated) RX ORDER — CIPROFLOXACIN 500 MG/1
TABLET, FILM COATED ORAL
Status: DISPENSED
Start: 2017-02-24

## (undated) RX ORDER — VECURONIUM BROMIDE 1 MG/ML
INJECTION, POWDER, LYOPHILIZED, FOR SOLUTION INTRAVENOUS
Status: DISPENSED
Start: 2017-07-13

## (undated) RX ORDER — LIDOCAINE HYDROCHLORIDE 10 MG/ML
INJECTION, SOLUTION INFILTRATION; PERINEURAL
Status: DISPENSED
Start: 2017-02-24

## (undated) RX ORDER — GLYCOPYRROLATE 0.2 MG/ML
INJECTION, SOLUTION INTRAMUSCULAR; INTRAVENOUS
Status: DISPENSED
Start: 2017-07-13